# Patient Record
Sex: MALE | Race: WHITE | Employment: UNEMPLOYED | URBAN - METROPOLITAN AREA
[De-identification: names, ages, dates, MRNs, and addresses within clinical notes are randomized per-mention and may not be internally consistent; named-entity substitution may affect disease eponyms.]

---

## 2018-05-17 ENCOUNTER — TRANSFERRED RECORDS (OUTPATIENT)
Dept: HEALTH INFORMATION MANAGEMENT | Facility: CLINIC | Age: 38
End: 2018-05-17

## 2018-06-27 ENCOUNTER — TRANSFERRED RECORDS (OUTPATIENT)
Dept: HEALTH INFORMATION MANAGEMENT | Facility: CLINIC | Age: 38
End: 2018-06-27

## 2018-06-28 ENCOUNTER — TRANSFERRED RECORDS (OUTPATIENT)
Dept: HEALTH INFORMATION MANAGEMENT | Facility: CLINIC | Age: 38
End: 2018-06-28

## 2018-08-08 ENCOUNTER — TRANSFERRED RECORDS (OUTPATIENT)
Dept: HEALTH INFORMATION MANAGEMENT | Facility: CLINIC | Age: 38
End: 2018-08-08

## 2018-08-13 ENCOUNTER — TRANSFERRED RECORDS (OUTPATIENT)
Dept: HEALTH INFORMATION MANAGEMENT | Facility: CLINIC | Age: 38
End: 2018-08-13

## 2018-08-20 ENCOUNTER — TRANSFERRED RECORDS (OUTPATIENT)
Dept: HEALTH INFORMATION MANAGEMENT | Facility: CLINIC | Age: 38
End: 2018-08-20

## 2018-08-27 ENCOUNTER — TRANSFERRED RECORDS (OUTPATIENT)
Dept: HEALTH INFORMATION MANAGEMENT | Facility: CLINIC | Age: 38
End: 2018-08-27

## 2018-10-17 ENCOUNTER — TRANSFERRED RECORDS (OUTPATIENT)
Dept: HEALTH INFORMATION MANAGEMENT | Facility: CLINIC | Age: 38
End: 2018-10-17

## 2018-10-18 ENCOUNTER — TRANSFERRED RECORDS (OUTPATIENT)
Dept: HEALTH INFORMATION MANAGEMENT | Facility: CLINIC | Age: 38
End: 2018-10-18

## 2018-10-23 ENCOUNTER — TRANSFERRED RECORDS (OUTPATIENT)
Dept: HEALTH INFORMATION MANAGEMENT | Facility: CLINIC | Age: 38
End: 2018-10-23

## 2018-10-24 ENCOUNTER — PRE VISIT (OUTPATIENT)
Dept: RADIATION ONCOLOGY | Facility: CLINIC | Age: 38
End: 2018-10-24

## 2018-10-24 NOTE — TELEPHONE ENCOUNTER
Date of appointment: TBD   Diagnosis/reason for appointment:Kidney cancer with Brain Mets  Referring provider/facility: Dr. Corado  Who called: [facility] [phone]    Recent Studies  Imaging:  Pathology:  Labs:  Previous chemo/radiation (if known):    Records requested from:  Records received from:    Additional information: [facility] is faxing records, pushing images to PACS and sending slides to be reviewed for SBRT

## 2018-10-26 ENCOUNTER — TELEPHONE (OUTPATIENT)
Dept: RADIATION ONCOLOGY | Facility: CLINIC | Age: 38
End: 2018-10-26

## 2018-10-26 PROCEDURE — 00000346 ZZHCL STATISTIC REVIEW OUTSIDE SLIDES TC 88321: Performed by: INTERNAL MEDICINE

## 2018-10-26 NOTE — TELEPHONE ENCOUNTER
Referral received from [facility] regarding CNS metastases-gamma knife or whole brain radiation therapy.  Per Dr. Rawls- MRI reviewed, extensive lesions- 20 or more, would not be gamma knife candidate.  Would recommend whole brain.  Called [facility] nurse for Dr. Rodriguez with this information.  She will update Dr. Rodriguez.  Gave our number for questions.

## 2018-10-30 LAB — COPATH REPORT: NORMAL

## 2018-10-30 NOTE — TELEPHONE ENCOUNTER
Received call back from Dr. Gallego's office. They called Raphael and informed him that he needs whole brain radiation. He will be seen closer to home.

## 2018-10-30 NOTE — TELEPHONE ENCOUNTER
Received call from [facility] radiation [facility]. Left message with triage nurse line at Dr. Gallego's office to confirm where patient needs to go for RT.

## 2018-11-09 ENCOUNTER — OFFICE VISIT (OUTPATIENT)
Dept: RADIATION THERAPY | Facility: OUTPATIENT CENTER | Age: 38
End: 2018-11-09
Payer: COMMERCIAL

## 2018-11-09 ENCOUNTER — APPOINTMENT (OUTPATIENT)
Dept: RADIATION THERAPY | Facility: OUTPATIENT CENTER | Age: 38
End: 2018-11-09
Payer: COMMERCIAL

## 2018-11-09 VITALS
WEIGHT: 233 LBS | DIASTOLIC BLOOD PRESSURE: 86 MMHG | HEART RATE: 104 BPM | SYSTOLIC BLOOD PRESSURE: 135 MMHG | RESPIRATION RATE: 18 BRPM | OXYGEN SATURATION: 92 %

## 2018-11-09 DIAGNOSIS — C64.2: Primary | ICD-10-CM

## 2018-11-09 PROBLEM — E11.9 TYPE 2 DIABETES MELLITUS TREATED WITHOUT INSULIN (H): Status: ACTIVE | Noted: 2018-04-19

## 2018-11-09 PROBLEM — F06.4 ANXIETY DISORDER DUE TO GENERAL MEDICAL CONDITION: Status: ACTIVE | Noted: 2018-07-10

## 2018-11-09 RX ORDER — ATORVASTATIN CALCIUM 10 MG/1
10 TABLET, FILM COATED ORAL DAILY
Status: ON HOLD | COMMUNITY
Start: 2018-03-15 | End: 2018-11-28

## 2018-11-09 RX ORDER — BENZONATATE 200 MG/1
200 CAPSULE ORAL 3 TIMES DAILY PRN
COMMUNITY
Start: 2018-10-30

## 2018-11-09 RX ORDER — METFORMIN HCL 500 MG
1000 TABLET, EXTENDED RELEASE 24 HR ORAL
COMMUNITY
Start: 2018-03-15 | End: 2019-03-15

## 2018-11-09 RX ORDER — DEXAMETHASONE 4 MG/1
4 TABLET ORAL 2 TIMES DAILY
COMMUNITY
Start: 2018-10-18 | End: 2018-11-16

## 2018-11-09 RX ORDER — DOCUSATE SODIUM 100 MG/1
100 CAPSULE, LIQUID FILLED ORAL 2 TIMES DAILY PRN
Status: ON HOLD | COMMUNITY
Start: 2018-11-03 | End: 2018-11-25

## 2018-11-09 RX ORDER — BUSPIRONE HYDROCHLORIDE 10 MG/1
10 TABLET ORAL 2 TIMES DAILY
COMMUNITY
Start: 2018-10-23 | End: 2019-04-21

## 2018-11-09 RX ORDER — HYDROCODONE BITARTRATE AND ACETAMINOPHEN 5; 325 MG/1; MG/1
1-2 TABLET ORAL EVERY 4 HOURS PRN
COMMUNITY
Start: 2018-11-03

## 2018-11-09 RX ORDER — LEVETIRACETAM 500 MG/1
500 TABLET ORAL 2 TIMES DAILY
COMMUNITY
Start: 2018-10-18 | End: 2019-10-18

## 2018-11-09 RX ORDER — CODEINE SULFATE 30 MG/1
30-60 TABLET ORAL EVERY 4 HOURS PRN
Status: ON HOLD | COMMUNITY
Start: 2018-11-06 | End: 2018-11-23

## 2018-11-09 RX ORDER — PANTOPRAZOLE SODIUM 40 MG/1
40 TABLET, DELAYED RELEASE ORAL DAILY
COMMUNITY
Start: 2018-10-17 | End: 2019-10-17

## 2018-11-09 ASSESSMENT — PAIN SCALES - GENERAL: PAINLEVEL: NO PAIN (0)

## 2018-11-09 NOTE — MR AVS SNAPSHOT
After Visit Summary   11/9/2018    Raphael Marin    MRN: [id number]           Patient Information     Date Of Birth          1980        Visit Information        Provider Department      11/9/2018 9:30 AM Gregg Quijano [facility]         Follow-ups after your visit        Your next 10 appointments already scheduled     Nov 09, 2018 11:00 AM CST   SIMULATION with Gregg Quijano MD, L[facility]   [facility] ([facility])    [address], [address]  [facility] [address]   [phone]              Who to contact     Please call your clinic at [phone] to:    Ask questions about your health    Make or cancel appointments    Discuss your medicines    Learn about your test results    Speak to your doctor            Additional Information About Your Visit        Care EveryWhere ID     This is your Care EveryWhere ID. This could be used by other organizations to access your [place] medical records  [id number]        Your Vitals Were     Pulse Respirations Pulse Oximetry             104 18 92%          Blood Pressure from Last 3 Encounters:   11/09/18 135/86    Weight from Last 3 Encounters:   11/09/18 105.7 kg (233 lb)              Today, you had the following     No orders found for display      Information about OPIOIDS     PRESCRIPTION OPIOIDS: WHAT YOU NEED TO KNOW   We gave you an opioid (narcotic) pain medicine. It is important to manage your pain, but opioids are not always the best choice. You should first try all the other options your care team gave you. Take this medicine for as short a time (and as few doses) as possible.    Some activities can increase your pain, such as bandage changes or therapy sessions. It may help to take your pain medicine 30 to 60 minutes before these activities. Reduce your stress by getting enough sleep, working on hobbies you enjoy and practicing relaxation or meditation. Talk to your care team about  ways to manage your pain beyond prescription opioids.    These medicines have risks:    DO NOT drive when on new or higher doses of pain medicine. These medicines can affect your alertness and reaction times, and you could be arrested for driving under the influence (DUI). If you need to use opioids long-term, talk to your care team about driving.    DO NOT operate heavy machinery    DO NOT do any other dangerous activities while taking these medicines.    DO NOT drink any alcohol while taking these medicines.     If the opioid prescribed includes acetaminophen, DO NOT take with any other medicines that contain acetaminophen. Read all labels carefully. Look for the word  acetaminophen  or  Tylenol.  Ask your pharmacist if you have questions or are unsure.    You can get addicted to pain medicines, especially if you have a history of addiction (chemical, alcohol or substance dependence). Talk to your care team about ways to reduce this risk.    All opioids tend to cause constipation. Drink plenty of water and eat foods that have a lot of fiber, such as fruits, vegetables, prune juice, apple juice and high-fiber cereal. Take a laxative (Miralax, milk of magnesia, Colace, Senna) if you don t move your bowels at least every other day. Other side effects include upset stomach, sleepiness, dizziness, throwing up, tolerance (needing more of the medicine to have the same effect), physical dependence and slowed breathing.    Store your pills in a secure place, locked if possible. We will not replace any lost or stolen medicine. If you don t finish your medicine, please throw away (dispose) as directed by your pharmacist. The [place] Pollution Control Agency has more information about safe disposal: https://www.pca.[email or web address].[email or web address]/living-green/managing-unwanted-medications         Primary Care Provider Office Phone # Fax #    Lul Guerrero [phone]       [address]  [place] [address]        Equal Access to Services     [facility] VIVIENNE : Hadii aad ku hadedvinadam Varela, watonyada luqadaha, qaybjae kaalmitzi lala, stacey carrion. So [facility] [phone].    ATENCIÓN: Si habla español, tiene a monzon disposición servicios gratuitos de asistencia lingüística. [facility] [phone].    We comply with applicable federal civil rights laws and [place] laws. We do not discriminate on the basis of race, color, national origin, age, disability, sex, sexual orientation, or gender identity.            Thank you!     Thank you for choosing [facility]  for your care. Our goal is always to provide you with excellent care. Hearing back from our patients is one way we can continue to improve our services. Please take a few minutes to complete the written survey that you may receive in the mail after your visit with us. Thank you!             Your Updated Medication List - Protect others around you: Learn how to safely use, store and throw away your medicines at www.disposemymeds.org.          This list is accurate as of 11/9/18 10:56 AM.  Always use your most recent med list.                   Brand Name Dispense Instructions for use Diagnosis    atorvastatin 10 MG tablet    LIPITOR     Take 10 mg by mouth daily        benzonatate 200 MG capsule    TESSALON     Take 200 mg by mouth 3 times daily as needed        busPIRone 10 MG tablet    BUSPAR     Take 10 mg by mouth 2 times daily        codeine 30 MG tablet      Take 30-60 mg by mouth every 4 hours as needed for cough        dexamethasone 4 MG tablet    DECADRON     Take 4 mg by mouth 2 times daily        docusate sodium 100 MG capsule    COLACE     Take 100 mg by mouth 2 times daily as needed        HYDROcodone-acetaminophen 5-325 MG per tablet    NORCO     Take 1-2 tablets by mouth every 4 hours as needed        levETIRAcetam 500 MG tablet    KEPPRA     Take 500 mg by mouth 2 times daily        metFORMIN  500 MG 24 hr tablet    GLUCOPHAGE-XR     Take 1,000 mg by mouth 2 times daily (before meals)        pantoprazole 40 MG EC tablet    PROTONIX     Take 40 mg by mouth daily

## 2018-11-09 NOTE — LETTER
2018      RE: Raphael Marin  [address] [place] [facility] [address]               Consultation Note    Name: Raphael Marin MRN: [id number]   : 1980   Date of Service: 2018  Referring: Dr. Corado     Reason for consultation: Metastatic renal cell carcinoma to brain    History of Present Illness   Mr. Marin is a 38 year old male with metastatic renal cell carcinoma to the brain with numerous intracranial metastasis.  The patient is now status post resection of a dominant right frontal lobe lesion on 2018.  Final pathology confirmed metastatic renal cell carcinoma.  The patient subsequent presents today to discuss the potential role of whole brain radiation therapy.      The patient's history dates to May 2018 when he initially presented with left flank pain and gross hematuria.  CT scan of the abdomen and pelvis at the time demonstrated an 11 cm mass arising from the left kidney and numerous lung nodules.  There is also noted retroperitoneal adenopathy.  On 2018 the patient underwent left radical nephrectomy.  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  A single left retroperitoneal lymph node was positive for metastatic carcinoma, pathologic T3a N1.  The patient was subsequently continued on adjuvant systemic therapy with sunitinib.  After progression of disease with increase in size and number of multiple lung nodules, periaortic lymph nodes, and left adrenal nodule systemic therapy was changed to ipilimumab and nivolumab.  Last  cycle of immunotherapy was on 2018.  More recently the patient presented with symptoms of headache and nausea.  He underwent MRI of the brain on 10/17/2018 which demonstrated numerous intracranial metastasis in bilateral cerebral hemispheres consistent with metastatic disease.  The largest is in the right frontal lobe measuring 3.5-3.5 cm.  There was evidence  for leftward midline shift and surrounding vasogenic edema.  On 11/1/2018 the patient underwent right frontal craniotomy for resection of the dominant frontal mass.  Final pathology confirmed metastatic renal cell carcinoma.  The patient subsequent presents today to discuss the potential role of whole brain radiation therapy.    He is overall doing well.  He states his headache and nausea symptoms have since resolved.  He denies any current focal neurologic change.  He remains on Decadron 4 mg twice daily.  Last chemotherapy cycle on 9/26/2018, schedule cycle on 10/17/2018 was held.  He will see the neurosurgery team on 11/14/2018 for suture removal.      Past Medical History:   Past Medical History:   Diagnosis Date     Brain metastases (H)     resection 11/1/18 [place] [place] Everywhere       Past Surgical History:   History reviewed. No pertinent surgical history.    Chemotherapy History:  Per HPI    Radiation History:  none    Pregnant: Not Applicable  Implanted Cardiac Devices: No    Medications:  Current Outpatient Prescriptions   Medication     atorvastatin (LIPITOR) 10 MG tablet     benzonatate (TESSALON) 200 MG capsule     busPIRone (BUSPAR) 10 MG tablet     codeine 30 MG tablet     dexamethasone (DECADRON) 4 MG tablet     docusate sodium (COLACE) 100 MG capsule     HYDROcodone-acetaminophen (NORCO) 5-325 MG per tablet     levETIRAcetam (KEPPRA) 500 MG tablet     metFORMIN (GLUCOPHAGE-XR) 500 MG 24 hr tablet     pantoprazole (PROTONIX) 40 MG EC tablet     No current facility-administered medications for this visit.          Allergies:   No Known Allergies    Social History:  Tobacco: none  Alcohol: none  Employment:     Family History:  History reviewed. No pertinent family history.    Review of Systems   A 10-point review of systems was performed. Pertinent findings are noted in the HPI.    Physical Exam   ECOG Status: 1    Vitals:  /86  Pulse 104  Resp 18  Wt 105.7 kg (233 lb)  SpO2  92%    Gen: Alert, in NAD  Head: NC/AT, s/p right frontal craniotomy. Incision with staples, healing well.   Eyes: PERRL, EOMI, sclera anicteric  Ears: No external auricular lesions  Pulm: No wheezing, stridor or respiratory distress  CV: Extremities are warm and well-perfused, no cyanosis, no pedal edema  Abdominal: Normal bowel sounds, soft, nontender, no masses  Musculoskeletal: Normal bulk and tone  Skin: Normal color and turgor  Neuro: A/Ox3, CN II-XII intact, normal gait. No focal neurologic change.     Imaging/Path/Labs   Imaging:   10/17/18  MRI Brain  FINDINGS:  Numerous intra-axial scattered peripherally enhancing lesions are seen  throughout cerebral hemispheres.  Largest is seen in the right frontal lobe,  image 21 series 12, measuring 3.5 x 3.5 cm.  Longitudinal dimension is about  3.3 cm image 25 series 13.  There is evidence of central necrosis.  Surrounding  vasogenic edema is present.  A peripherally enhancing lesion is seen in the  left frontal lobe superiorly image 25 series 12, measuring about 1.5 cm.  Peripherally enhancing lesion is seen in the left parietal lobe image 21 series  12, measuring about 1.5 cm.  There smaller enhancing lesions seen in both  frontal lobes, both parietal lobes, both occipital lobes, left temporal lobe.  An enhancing lesion is seen in the left cerebellar hemisphere image 8 of series  12 measuring 1.8 cm.  A smaller enhancing lesion is seen in the right  cerebellar hemisphere image 8 of series 12 measuring about 4 mm.  The  appearance is consistent with widespread intracerebral metastatic disease.  There foci of blooming artifact seen within the largest lesion right frontal  lobe, suggesting intralesional petechial hemorrhage.  There is leftward midline  shift from the right frontal lobe lesion, measuring about 4 mm.  Basilar  cisterns are patent.    Visualized vascular structures demonstrate normal appearing flow voids.  No  definite acute intracerebral  hemorrhage.  No evidence for nonhemorrhagic  infarct.    Sella, suprasellar regions appear normal.  No intraorbital abnormalities are  identified.  Mucosal thickening is seen in right ethmoid air cells and left  maxillary sinus.  Retention cyst right maxillary sinus.  No abnormal temporal  bone signal.    IMPRESSION:  1. Numerous enhancing intracerebral lesions are present bilaterally, consistent  with metastatic disease.  The largest is in the right frontal lobe creating  leftward midline shift and surrounding vasogenic edema.  There is evidence of  intralesional petechial hemorrhage within the largest lesion.  Lesions are also  seen within cerebellar hemispheres.  2. No evidence for transtentorial herniation.  No evidence for nonhemorrhagic  infarct.  3. Sinus disease as described.    11/2/18  MRI brain (post-op)  FINDINGS:  There is interval postop change reflecting right frontal craniotomy and  excision of right frontal lobe mass, with fluid/hemorrhage in the operative  bed, not unexpected.  There is some peripheral T1 hyperintensity in the  operative bed, which is most compatible with postoperative hemorrhage.  There  is a small amount of residual enhancing soft tissue in the inferior medial  aspect of the operative bed measuring 0.8 cm in diameter which may represent  underlying residual malignancy or a separate adjacent metastasis.        Numerous metastases with peripheral ring enhancement seen in the frontal,  parietal, left temporal lobes as well as the bilateral cerebellar hemispheres.  The multifocal ring-enhancing lesions are all slightly increased in size when  compared to the previous study, with slight increase in surrounding vasogenic  edema.  For example the right cerebellar lesion does appear to be slightly  increased in conspicuity now measuring 0.6 cm, previously measuring only 0.3  cm.  A 2nd right cerebellar lesion more inferiorly on series 14 image 46 is now  seen measuring 0.3 mm, previously  seen as only a tiny punctate area of  enhancement.  Left temporal lobe mass measures 1.2 x 0.9 cm in diameter,  slightly increased in size.  Another left posterior temporal lobe lesion  measures 0.4 cm in diameter, previously only a punctate focus of enhancement.        There is no acute cortical infarct.  All major flow voids are present and  unremarkable in appearance.        Chronic mucosal thickening is seen in the right maxillary and left maxillary  sinus with complete right maxillary sinus opacification.  Mastoids are clear.  The sella and craniocervical junction are normal.  Basilar cisterns are patent.    IMPRESSION:  1. Interval postop change reflecting right frontal lobe mass excision.  2. Interval increase in size of numerous cerebral and cerebellar metastases  with slight increase in surrounding vasogenic edema  3. No acute infarct.    Path:   11/1/18  DIAGNOSIS:      RIGHT FRONTAL LOBE MASS, BIOPSY       --METASTATIC CARCINOMA, CONSISTENT WITH RENAL PRIMARY      RIGHT FRONTAL LOBE MASS, EXCISION       --METASTATIC CARCINOMA, CONSISTENT WITH RENAL PRIMARY        Assessment    Mr. Marin is a 38 year old male with metastatic renal cell carcinoma to the brain with numerous intracranial metastasis.  The patient is now status post resection of a dominant right frontal lobe lesion on 11/1/2018.  Final pathology confirmed metastatic renal cell carcinoma.  The patient subsequent presents today to discuss the potential role of whole brain radiation therapy.      Plan   1.  Given the presence of multiple intracranial metastasis, we recommend whole brain radiation therapy. We plan to treat to 30 Gy in 10 fractions.     2.  Side effects of treatment were discussed with the patient including but not limited to fatigue, scalp irritation, alopecia, focal neurologic change, and neurocognitive decline.  CT simulation was obtained today.  Consent obtained today.  Plan will be to begin radiation treatment in approximately 1  week after suture removal.    3.  We recommend continuing his current dose of steroid as radiation induced inflammation could also elicit further neurologic change.  We will plan to taper steroid after radiation therapy is complete.    4.  We will discuss with our medical oncology colleagues for recommend consideration of systemic therapy 2-4 weeks after completion of radiation treatment to allow for the patient to recover from acute side effects.  The patient agrees with the current plan in place.      Gregg Quijano MD  Department of Radiation Oncology  [facility]

## 2018-11-09 NOTE — PROGRESS NOTES
Department of Radiation Oncology  Radiation Therapy Center  [facility]  [address], [address]  [place] [address]  [phone]       Consultation Note    Name: Raphael Marin MRN: [id number]   : 1980   Date of Service: 2018  Referring: Dr. Corado     Reason for consultation: Metastatic renal cell carcinoma to brain    History of Present Illness   Mr. Marin is a 38 year old male with metastatic renal cell carcinoma to the brain with numerous intracranial metastasis.  The patient is now status post resection of a dominant right frontal lobe lesion on 2018.  Final pathology confirmed metastatic renal cell carcinoma.  The patient subsequent presents today to discuss the potential role of whole brain radiation therapy.      The patient's history dates to May 2018 when he initially presented with left flank pain and gross hematuria.  CT scan of the abdomen and pelvis at the time demonstrated an 11 cm mass arising from the left kidney and numerous lung nodules.  There is also noted retroperitoneal adenopathy.  On 2018 the patient underwent left radical nephrectomy.  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  A single left retroperitoneal lymph node was positive for metastatic carcinoma, pathologic T3a N1.  The patient was subsequently continued on adjuvant systemic therapy with sunitinib.  After progression of disease with increase in size and number of multiple lung nodules, periaortic lymph nodes, and left adrenal nodule systemic therapy was changed to ipilimumab and nivolumab.  Last  cycle of immunotherapy was on 2018.  More recently the patient presented with symptoms of headache and nausea.  He underwent MRI of the brain on 10/17/2018 which demonstrated numerous intracranial metastasis in bilateral cerebral hemispheres consistent with metastatic  disease.  The largest is in the right frontal lobe measuring 3.5-3.5 cm.  There was evidence for leftward midline shift and surrounding vasogenic edema.  On 11/1/2018 the patient underwent right frontal craniotomy for resection of the dominant frontal mass.  Final pathology confirmed metastatic renal cell carcinoma.  The patient subsequent presents today to discuss the potential role of whole brain radiation therapy.    He is overall doing well.  He states his headache and nausea symptoms have since resolved.  He denies any current focal neurologic change.  He remains on Decadron 4 mg twice daily.  Last chemotherapy cycle on 9/26/2018, schedule cycle on 10/17/2018 was held.  He will see the neurosurgery team on 11/14/2018 for suture removal.      Past Medical History:   Past Medical History:   Diagnosis Date     Brain metastases (H)     [facility] [place]       Past Surgical History:   History reviewed. No pertinent surgical history.    Chemotherapy History:  Per HPI    Radiation History:  none    Pregnant: Not Applicable  Implanted Cardiac Devices: No    Medications:  Current Outpatient Prescriptions   Medication     atorvastatin (LIPITOR) 10 MG tablet     benzonatate (TESSALON) 200 MG capsule     busPIRone (BUSPAR) 10 MG tablet     codeine 30 MG tablet     dexamethasone (DECADRON) 4 MG tablet     docusate sodium (COLACE) 100 MG capsule     HYDROcodone-acetaminophen (NORCO) 5-325 MG per tablet     levETIRAcetam (KEPPRA) 500 MG tablet     metFORMIN (GLUCOPHAGE-XR) 500 MG 24 hr tablet     pantoprazole (PROTONIX) 40 MG EC tablet     No current facility-administered medications for this visit.          Allergies:   No Known Allergies    Social History:  Tobacco: none  Alcohol: none  Employment:     Family History:  History reviewed. No pertinent family history.    Review of Systems   A 10-point review of systems was performed. Pertinent findings are noted in the HPI.    Physical Exam    ECOG Status: 1    Vitals:  /86  Pulse 104  Resp 18  Wt 105.7 kg (233 lb)  SpO2 92%    Gen: Alert, in NAD  Head: NC/AT, s/p right frontal craniotomy. Incision with staples, healing well.   Eyes: PERRL, EOMI, sclera anicteric  Ears: No external auricular lesions  Pulm: No wheezing, stridor or respiratory distress  CV: Extremities are warm and well-perfused, no cyanosis, no pedal edema  Abdominal: Normal bowel sounds, soft, nontender, no masses  Musculoskeletal: Normal bulk and tone  Skin: Normal color and turgor  Neuro: A/Ox3, CN II-XII intact, normal gait. No focal neurologic change.     Imaging/Path/Labs   Imaging:   10/17/18  MRI Brain  FINDINGS:  Numerous intra-axial scattered peripherally enhancing lesions are seen  throughout cerebral hemispheres.  Largest is seen in the right frontal lobe,  image 21 series 12, measuring 3.5 x 3.5 cm.  Longitudinal dimension is about  3.3 cm image 25 series 13.  There is evidence of central necrosis.  Surrounding  vasogenic edema is present.  A peripherally enhancing lesion is seen in the  left frontal lobe superiorly image 25 series 12, measuring about 1.5 cm.  Peripherally enhancing lesion is seen in the left parietal lobe image 21 series  12, measuring about 1.5 cm.  There smaller enhancing lesions seen in both  frontal lobes, both parietal lobes, both occipital lobes, left temporal lobe.  An enhancing lesion is seen in the left cerebellar hemisphere image 8 of series  12 measuring 1.8 cm.  A smaller enhancing lesion is seen in the right  cerebellar hemisphere image 8 of series 12 measuring about 4 mm.  The  appearance is consistent with widespread intracerebral metastatic disease.  There foci of blooming artifact seen within the largest lesion right frontal  lobe, suggesting intralesional petechial hemorrhage.  There is leftward midline  shift from the right frontal lobe lesion, measuring about 4 mm.  Basilar  cisterns are patent.    Visualized vascular  structures demonstrate normal appearing flow voids.  No  definite acute intracerebral hemorrhage.  No evidence for nonhemorrhagic  infarct.    Sella, suprasellar regions appear normal.  No intraorbital abnormalities are  identified.  Mucosal thickening is seen in right ethmoid air cells and left  maxillary sinus.  Retention cyst right maxillary sinus.  No abnormal temporal  bone signal.    IMPRESSION:  1. Numerous enhancing intracerebral lesions are present bilaterally, consistent  with metastatic disease.  The largest is in the right frontal lobe creating  leftward midline shift and surrounding vasogenic edema.  There is evidence of  intralesional petechial hemorrhage within the largest lesion.  Lesions are also  seen within cerebellar hemispheres.  2. No evidence for transtentorial herniation.  No evidence for nonhemorrhagic  infarct.  3. Sinus disease as described.    11/2/18  MRI brain (post-op)  FINDINGS:  There is interval postop change reflecting right frontal craniotomy and  excision of right frontal lobe mass, with fluid/hemorrhage in the operative  bed, not unexpected.  There is some peripheral T1 hyperintensity in the  operative bed, which is most compatible with postoperative hemorrhage.  There  is a small amount of residual enhancing soft tissue in the inferior medial  aspect of the operative bed measuring 0.8 cm in diameter which may represent  underlying residual malignancy or a separate adjacent metastasis.        Numerous metastases with peripheral ring enhancement seen in the frontal,  parietal, left temporal lobes as well as the bilateral cerebellar hemispheres.  The multifocal ring-enhancing lesions are all slightly increased in size when  compared to the previous study, with slight increase in surrounding vasogenic  edema.  For example the right cerebellar lesion does appear to be slightly  increased in conspicuity now measuring 0.6 cm, previously measuring only 0.3  cm.  A 2nd right cerebellar  lesion more inferiorly on series 14 image 46 is now  seen measuring 0.3 mm, previously seen as only a tiny punctate area of  enhancement.  Left temporal lobe mass measures 1.2 x 0.9 cm in diameter,  slightly increased in size.  Another left posterior temporal lobe lesion  measures 0.4 cm in diameter, previously only a punctate focus of enhancement.        There is no acute cortical infarct.  All major flow voids are present and  unremarkable in appearance.        Chronic mucosal thickening is seen in the right maxillary and left maxillary  sinus with complete right maxillary sinus opacification.  Mastoids are clear.  The sella and craniocervical junction are normal.  Basilar cisterns are patent.    IMPRESSION:  1. Interval postop change reflecting right frontal lobe mass excision.  2. Interval increase in size of numerous cerebral and cerebellar metastases  with slight increase in surrounding vasogenic edema  3. No acute infarct.    Path:   11/1/18  DIAGNOSIS:      RIGHT FRONTAL LOBE MASS, BIOPSY       --METASTATIC CARCINOMA, CONSISTENT WITH RENAL PRIMARY      RIGHT FRONTAL LOBE MASS, EXCISION       --METASTATIC CARCINOMA, CONSISTENT WITH RENAL PRIMARY        Assessment    Mr. Marin is a 38 year old male with metastatic renal cell carcinoma to the brain with numerous intracranial metastasis.  The patient is now status post resection of a dominant right frontal lobe lesion on 11/1/2018.  Final pathology confirmed metastatic renal cell carcinoma.  The patient subsequent presents today to discuss the potential role of whole brain radiation therapy.      Plan   1.  Given the presence of multiple intracranial metastasis, we recommend whole brain radiation therapy. We plan to treat to 30 Gy in 10 fractions.     2.  Side effects of treatment were discussed with the patient including but not limited to fatigue, scalp irritation, alopecia, focal neurologic change, and neurocognitive decline.  CT simulation was obtained today.   Consent obtained today.  Plan will be to begin radiation treatment in approximately 1 week after suture removal.    3.  We recommend continuing his current dose of steroid as radiation induced inflammation could also elicit further neurologic change.  We will plan to taper steroid after radiation therapy is complete.    4.  We will discuss with our medical oncology colleagues for recommend consideration of systemic therapy 2-4 weeks after completion of radiation treatment to allow for the patient to recover from acute side effects.  The patient agrees with the current plan in place.      Gregg Quijano MD  Department of Radiation Oncology  [facility]

## 2018-11-09 NOTE — NURSING NOTE
REASON FOR APPOINTMENT   Type of Cancer: Clear cell/Renal, now with brain mets  Location: also has lung mets  Date of Symptom Onset: diagnosised 6/2018, progression in lungs found shortly after, neuro symptoms mid October prompted CT scan of head which showed numerous brain mets    TREATMENT TO-DATE FOR THIS CANCER  Surgery ? 11/1/18 resection of brain met, L nephrectomy 6/27/18   Chemotherapy ? Was most recently on immunotherapy, last infusion 9/26/18   Other Treatments for this Cancer ? Discussion for whole brain, then will return to med onc for ongoing immune therapy    PERSONAL HISTORY OF CANCER   Previous Cancer ? no   Prior Radiation ? no   Prior Chemotherapy ? no   Prior Hormonal Therapy ? no     RECENT IMAGING STUDIES  See PACS and care everywhere for images from [place]    REFERRALS NEEDED  None at this time. Currently working with [facility] for financial support    VITALS  /86  Pulse 104  Resp 18  Wt 105.7 kg (233 lb)  SpO2 92%    PACEMAKER/IMPLANTED CARDIAC DEVICE no    PAIN  Denies    PSYCHOSOCIAL  Marital Status: single  Patient lives in [place] with mom and step dad. Pt had to move back home due to health and financial changes.  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 Currently unemployed due to medical treatments  Do you feel safe in your home? Yes    REVIEW OF SYSTEMS  Skin: large incision on R scalp, staples C/D/I.  No s/s of infection.  Eyes: glasses, denies vision chagnes  Ears/Nose/Throat: negative  Respiratory: Dyspnea on exertion- ongoing for at least 2 months and Cough- ongoing for at least 2 months - potentially related to lung mets and immunothearpy  Cardiovascular: negative  Gastrointestinal: negative  Genitourinary: negative  Musculoskeletal: negative  Neurologic: negative for: headaches, seizures and local weakness  Psychiatric: stress/anxiety  Hematologic/Lymphatic/Immunologic: negative  Endocrine: diabetes    Radiation  "Oncology Patient Teaching    Current Concern: \"how many treatments?\" \" can I drive\"    Person involved with teaching: Patient and Mother  Patient asked Questions: Yes  Patient was cooperative: Yes  Patient was receptive (willing to accept information given): Yes    Education Assessment  Comprehension ability: Medium  Knowledge level: Medium  Factors affecting teaching: stress, new infomration    Education Materials Given  Radiation Therapy and You  Radiation to the Brain  Caring for Your Skin During Radiation ...    Educational Topics Discussed  Side effects, Medications, Activity, Nutrition, Adjustment to illness and When to call MD/RN    Response To Teaching  More review necessary    Do you have an advanced directive or living will? no  Are you DNR/DNI? no        "

## 2018-11-15 ENCOUNTER — APPOINTMENT (OUTPATIENT)
Dept: RADIATION THERAPY | Facility: OUTPATIENT CENTER | Age: 38
End: 2018-11-15
Payer: COMMERCIAL

## 2018-11-16 ENCOUNTER — APPOINTMENT (OUTPATIENT)
Dept: RADIATION THERAPY | Facility: OUTPATIENT CENTER | Age: 38
End: 2018-11-16
Payer: COMMERCIAL

## 2018-11-16 DIAGNOSIS — C79.31 BRAIN METASTASIS: Primary | ICD-10-CM

## 2018-11-16 RX ORDER — DEXAMETHASONE 4 MG/1
4 TABLET ORAL EVERY 8 HOURS
Qty: 90 TABLET | Refills: 0 | Status: ON HOLD | OUTPATIENT
Start: 2018-11-16 | End: 2018-11-25

## 2018-11-16 NOTE — PROGRESS NOTES
Radiation Oncology Progress Note    Patient was seen today after radiation treatment with complaint of mild headache.  On discussion, patient and family felt they had noticed slight increase in confusion and difficulty with word choice. While overall coherent and without any focal change on neurologic exam, there was subtle confusion noticed when compared to initial consultation since starting radiation treatment. Patient is currently on decadron 4mg BID.     PE:  AOX3. Coherent. Speaking overall clearly without difficulty. Very subtle increase in confusion at times.   No gross focal neurologic deficits.       A/P: 38M with metastatic RCC to brain receiving whole brain radiation treatment. Very subtle increase confusion compared to pre-radiation baseline.    - Increase decadron dose from 4mg BID to TID.   - Discussed with patient and family if neurologic symptoms acutely worsen despite increase in steroid dose, would have patient go to emergency department for evaluation.   - Patient and family agree with plan in place.       Gregg Quijano M.D.  Department of Radiation Oncology  [facility]

## 2018-11-19 ENCOUNTER — APPOINTMENT (OUTPATIENT)
Dept: RADIATION THERAPY | Facility: OUTPATIENT CENTER | Age: 38
End: 2018-11-19
Payer: COMMERCIAL

## 2018-11-20 ENCOUNTER — APPOINTMENT (OUTPATIENT)
Dept: RADIATION THERAPY | Facility: OUTPATIENT CENTER | Age: 38
End: 2018-11-20
Payer: COMMERCIAL

## 2018-11-21 ENCOUNTER — OFFICE VISIT (OUTPATIENT)
Dept: RADIATION THERAPY | Facility: OUTPATIENT CENTER | Age: 38
End: 2018-11-21
Payer: COMMERCIAL

## 2018-11-21 ENCOUNTER — APPOINTMENT (OUTPATIENT)
Dept: RADIATION THERAPY | Facility: OUTPATIENT CENTER | Age: 38
End: 2018-11-21
Payer: COMMERCIAL

## 2018-11-21 VITALS
RESPIRATION RATE: 20 BRPM | DIASTOLIC BLOOD PRESSURE: 86 MMHG | HEART RATE: 108 BPM | OXYGEN SATURATION: 91 % | SYSTOLIC BLOOD PRESSURE: 125 MMHG

## 2018-11-21 DIAGNOSIS — C79.31 BRAIN METASTASIS: Primary | ICD-10-CM

## 2018-11-21 DIAGNOSIS — C64.2: Primary | ICD-10-CM

## 2018-11-21 DIAGNOSIS — C78.00 METASTASIS TO LUNG (H): ICD-10-CM

## 2018-11-21 DIAGNOSIS — C64.2 MALIGNANT TUMOR OF KIDNEY, LEFT (H): ICD-10-CM

## 2018-11-21 NOTE — PROGRESS NOTES
[facility]YS[facility]ANS  SPECIALIZING IN BREAKTHROUGHS  Radiation Oncology    On Treatment Visit Note      Raphael Marin      Date: 2018   MRN: [id number]   : 1980  Diagnosis: Clear Cell Renal with mets to brain      Reason for Visit:  On Radiation Treatment Visit     Treatment Summary to Date  Treatment Site: whole brain Current Dose: 1500/3000 cGy Fractions: 5/10        Subjective:   Patient has had mild intermittent headache and mild increase in right upper extremity weakness. He states he first noticed mild weakness in RUE after initial surgery after resection of dominant brain met. Since starting RT, he has noticed slight increase in weakness in RUE compared to left. No falls. Energy is low. Not eating a whole lot. PO intake limited by mild abdominal bloating. Recently took magnesium citrate to help with bowel movement and constipation, currently using miralax daily for bowel regimen.    Nursing ROS:   Nutrition Alteration  Diet Type: Patient's Preference  Skin  Skin Reaction: 0 - No changes        Cardiovascular  Respiratory effort: 2 - Mild dyspnea on exertion  Gastrointestinal  Nausea: 1 - One to two episodes of nausea/24  Constipation NCI: 2 - Persistent symptoms        Pain Assessment  Pain Note: pain in abdomen better      Objective:   /86  Pulse 108  Resp 20  SpO2 91%   No apparent distress.   Speech coherent and fluent.   Slight RUE weakness compared to left. No sensory change.     Labs:  CBC RESULTS: No results for input(s): WBC, RBC, HGB, HCT, MCV, MCH, MCHC, RDW, PLT in the last 42391 hours.  ELECTROLYTES:  No results for input(s): NA, POTASSIUM, CHLORIDE, MED, CO2, BUN, CR, GLC in the last 53666 hours.    Assessment:  38M with metastatic renal cell cancer to brain, receiving whole brain radiation therapy.   Tolerating radiation therapy well.  All questions and concerns addressed.    Plan:   1. Continue current therapy.    2. Given increase in RUE weakness, there is  concern for radiation induced inflammation. Will Increase decadron dose from 4mg q8hrs to q6hrs.   3. Discussed with patient and family if neurologic symptoms acutely worsen despite increase in steroid dose, would have patient go to emergency department for evaluation.  4. Continue aggressive bowel regimen with miralax and adequate hydration. Consider Gas -X for bloating symptoms.   5.  Discussed appetite stimulant with patient, currently does not wish to take.       Mosaiq chart and setup information reviewed  Ports checked    Medication Review  Med list reviewed with patient?: Yes    Educational Topic Discussed  Education Instructions: talked about nutrition, bowel management      Gregg Quijano MD

## 2018-11-21 NOTE — MR AVS SNAPSHOT
After Visit Summary   11/21/2018    Raphael Marin    MRN: [id number]           Patient Information     Date Of Birth          1980        Visit Information        Provider Department      11/21/2018 4:05 PM Gregg Quijano MD Radiation Therapy Center         Follow-ups after your visit        Your next 10 appointments already scheduled     Nov 23, 2018  3:50 PM CST   Linear Accelerator with [facility] Rad Tech   Radiation Therapy Center ([facility])    [address], [address]  [facility] [address]   [phone]            Nov 26, 2018  3:50 PM CST   Linear Accelerator with [facility] Rad Tech   Radiation Therapy Center ([facility])    [address], [address]  [facility] [address]   [phone]            Nov 27, 2018  3:50 PM CST   Linear Accelerator with [facility] Rad Tech   Radiation Therapy Center ([facility])    [address], [address]  [facility] [address]   [phone]            Nov 28, 2018  3:50 PM CST   Linear Accelerator with [facility] Rad Tech   Radiation Therapy Center ([facility])    [address], [address]  [facility] [address]   [phone]            Nov 28, 2018  4:05 PM CST   ON TREATMENT VISIT with Gregg Quijano MD   Radiation Therapy Center ([facility])    [address]  [facility] [address]   [phone]            Nov 29, 2018  3:50 PM CST   Linear Accelerator with [facility] Rad Tech   Radiation Therapy Center ([facility])    [address], [address]  [facility] [address]   [phone]              Who to contact     Please call your clinic at [phone] to:    Ask questions about your health    Make or cancel appointments    Discuss your medicines    Learn about your test results    Speak to your doctor            Additional Information About Your Visit        Care EveryWhere ID     This is your Care EveryWhere ID. This could be used by other organizations to  access your [place] medical records  [id number]         Blood Pressure from Last 3 Encounters:   11/09/18 135/86    Weight from Last 3 Encounters:   11/09/18 105.7 kg (233 lb)              Today, you had the following     No orders found for display         Today's Medication Changes          These changes are accurate as of 11/21/18  4:24 PM.  If you have any questions, ask your nurse or doctor.               Start taking these medicines.        Dose/Directions    order for DME   Used for:  Brain metastasis (H), Metastasis to lung (H)   Started by:  Gregg Quijano MD        Equipment being ordered: Hospital Bed Requesting hospital bed for living room on main floor. 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 Dizziness, fall risk, fatigue/malaise, difficulty navigating stairs due to brain mets. Also CERON/SOB affects mobility around home especially up and down stairs.   Quantity:  1 each   Refills:  0            Where to get your medicines      Some of these will need a paper prescription and others can be bought over the counter.  Ask your nurse if you have questions.     Bring a paper prescription for each of these medications     order for DME                Primary Care Provider Office Phone # Fax #    Lul Guerrero [phone]       [address] [place] [address]        Equal Access to Services     [place] AH: Kat busby Soeyal, waaxda luqadaha, qaybta kaalmada adeegyada, stacey carrion. So [facility] [phone].    ATENCIÓN: Si habla español, tiene a monzon disposición servicios gratuitos de asistencia lingüística. Llame al [phone].    We comply with applicable federal civil rights laws and [place] laws. We do not discriminate on the basis of race, color, national origin, age, disability, sex, sexual orientation, or gender identity.            Thank you!      Thank you for choosing [facility]Y [facility]ENTER  for your care. Our goal is always to provide you with excellent care. Hearing back from our patients is one way we can continue to improve our services. Please take a few minutes to complete the written survey that you may receive in the mail after your visit with us. Thank you!             Your Updated Medication List - Protect others around you: Learn how to safely use, store and throw away your medicines at www.disposemymeds.org.          This list is accurate as of 11/21/18  4:24 PM.  Always use your most recent med list.                   Brand Name Dispense Instructions for use Diagnosis    atorvastatin 10 MG tablet    LIPITOR     Take 10 mg by mouth daily        benzonatate 200 MG capsule    TESSALON     Take 200 mg by mouth 3 times daily as needed        busPIRone 10 MG tablet    BUSPAR     Take 10 mg by mouth 2 times daily        codeine 30 MG tablet      Take 30-60 mg by mouth every 4 hours as needed for cough        dexamethasone 4 MG tablet    DECADRON    90 tablet    Take 1 tablet (4 mg) by mouth every 8 hours    Brain metastasis (H)       docusate sodium 100 MG capsule    COLACE     Take 100 mg by mouth 2 times daily as needed        HYDROcodone-acetaminophen 5-325 MG tablet    NORCO     Take 1-2 tablets by mouth every 4 hours as needed        levETIRAcetam 500 MG tablet    KEPPRA     Take 500 mg by mouth 2 times daily        metFORMIN 500 MG 24 hr tablet    GLUCOPHAGE-XR     Take 1,000 mg by mouth 2 times daily (before meals)        order for DME     1 each    Equipment being ordered: Hospital Bed Requesting hospital bed for living room on main floor. 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 Dizziness, fall risk, fatigue/malaise, difficulty navigating stairs due to brain mets. Also CERON/SOB affects mobility around home especially up and down stairs.    Brain metastasis (H), Metastasis to lung (H)        pantoprazole 40 MG EC tablet    PROTONIX     Take 40 mg by mouth daily

## 2018-11-21 NOTE — LETTER
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 He states he first noticed mild weakness in RUE after initial surgery after resection of dominant brain met. Since starting RT, he has noticed slight increase in weakness in RUE compared to left. No falls. Energy is low. Not eating a whole lot. PO intake limited by mild abdominal bloating. Recently took magnesium citrate to help with bowel movement and constipation, currently using miralax daily for bowel regimen.    Nursing ROS:   Nutrition Alteration  Diet Type: Patient's Preference  Skin  Skin Reaction: 0 - No changes     Cardiovascular  Respiratory effort: 2 - Mild dyspnea on exertion  Gastrointestinal  Nausea: 1 - One to two episodes of nausea/24  Constipation NCI: 2 - Persistent symptoms       Pain Assessment  Pain Note: pain in abdomen better    Objective:   /86  Pulse 108  Resp 20  SpO2 91%   No apparent distress.   Speech coherent and fluent.   Slight RUE weakness compared to left. No sensory change.     Labs:  CBC RESULTS: No results for input(s): WBC, RBC, HGB, HCT, MCV, MCH, MCHC, RDW, PLT in the last 49806 hours.  ELECTROLYTES:  No results for input(s): NA, POTASSIUM, CHLORIDE, MDE, CO2, BUN, CR, GLC in the last 08725 hours.    Assessment:  38M with metastatic renal cell cancer to brain, receiving whole brain radiation therapy.   Tolerating radiation therapy well.  All questions and concerns addressed.    Plan:   1. Continue  current therapy.    2. Given increase in RUE weakness, there is concern for radiation induced inflammation. Will Increase decadron dose from 4mg q8hrs to q6hrs.   3. Discussed with patient and family if neurologic symptoms acutely worsen despite increase in steroid dose, would have patient go to emergency department for evaluation.  4. Continue aggressive bowel regimen with miralax and adequate hydration. Consider Gas -X for bloating symptoms.   5.  Discussed appetite stimulant with patient, currently does not wish to take.     Mosaiq chart and setup information reviewed  Ports checked    Medication Review  Med list reviewed with patient?: Yes  Educational Topic Discussed  Education Instructions: talked about nutrition, bowel management    Gregg Quijano MD

## 2018-11-23 ENCOUNTER — APPOINTMENT (OUTPATIENT)
Dept: RADIATION THERAPY | Facility: OUTPATIENT CENTER | Age: 38
End: 2018-11-23
Payer: COMMERCIAL

## 2018-11-23 ENCOUNTER — HOSPITAL ENCOUNTER (INPATIENT)
Facility: CLINIC | Age: 38
LOS: 6 days | Discharge: HOME HEALTH | End: 2018-11-29
Attending: FAMILY MEDICINE | Admitting: FAMILY MEDICINE
Payer: COMMERCIAL

## 2018-11-23 ENCOUNTER — APPOINTMENT (OUTPATIENT)
Dept: CT IMAGING | Facility: CLINIC | Age: 38
End: 2018-11-23
Attending: PHYSICIAN ASSISTANT
Payer: COMMERCIAL

## 2018-11-23 ENCOUNTER — TELEPHONE (OUTPATIENT)
Dept: RADIATION THERAPY | Facility: OUTPATIENT CENTER | Age: 38
End: 2018-11-23

## 2018-11-23 DIAGNOSIS — K59.01 SLOW TRANSIT CONSTIPATION: ICD-10-CM

## 2018-11-23 DIAGNOSIS — R63.0 ANOREXIA: Primary | ICD-10-CM

## 2018-11-23 DIAGNOSIS — C64.2: ICD-10-CM

## 2018-11-23 DIAGNOSIS — C79.31 BRAIN METASTASIS: ICD-10-CM

## 2018-11-23 DIAGNOSIS — R53.0 NEOPLASTIC (MALIGNANT) RELATED FATIGUE: ICD-10-CM

## 2018-11-23 DIAGNOSIS — R11.2 NAUSEA AND VOMITING, INTRACTABILITY OF VOMITING NOT SPECIFIED, UNSPECIFIED VOMITING TYPE: ICD-10-CM

## 2018-11-23 PROBLEM — R10.84 ABDOMINAL PAIN, GENERALIZED: Status: ACTIVE | Noted: 2018-11-23

## 2018-11-23 PROBLEM — R62.51 FAILURE TO THRIVE (0-17): Status: ACTIVE | Noted: 2018-11-23

## 2018-11-23 PROBLEM — R62.7 FAILURE TO THRIVE IN ADULT: Status: ACTIVE | Noted: 2018-11-23

## 2018-11-23 LAB
ALBUMIN UR-MCNC: NEGATIVE MG/DL
ANION GAP SERPL CALCULATED.3IONS-SCNC: 11 MMOL/L (ref 3–14)
APPEARANCE UR: CLEAR
BASOPHILS # BLD AUTO: 0 10E9/L (ref 0–0.2)
BASOPHILS NFR BLD AUTO: 0.1 %
BILIRUB UR QL STRIP: NEGATIVE
BUN SERPL-MCNC: 33 MG/DL (ref 7–30)
CALCIUM SERPL-MCNC: 12.5 MG/DL (ref 8.5–10.1)
CHLORIDE SERPL-SCNC: 101 MMOL/L (ref 94–109)
CO2 SERPL-SCNC: 24 MMOL/L (ref 20–32)
COLOR UR AUTO: YELLOW
CREAT SERPL-MCNC: 1.1 MG/DL (ref 0.66–1.25)
DIFFERENTIAL METHOD BLD: ABNORMAL
EOSINOPHIL # BLD AUTO: 0 10E9/L (ref 0–0.7)
EOSINOPHIL NFR BLD AUTO: 0.2 %
ERYTHROCYTE [DISTWIDTH] IN BLOOD BY AUTOMATED COUNT: 12.9 % (ref 10–15)
GFR SERPL CREATININE-BSD FRML MDRD: 74 ML/MIN/1.7M2
GLUCOSE BLDC GLUCOMTR-MCNC: 238 MG/DL (ref 70–99)
GLUCOSE SERPL-MCNC: 239 MG/DL (ref 70–99)
GLUCOSE UR STRIP-MCNC: NEGATIVE MG/DL
HBA1C MFR BLD: 6.7 % (ref 0–5.6)
HCT VFR BLD AUTO: 45.8 % (ref 40–53)
HGB BLD-MCNC: 15.2 G/DL (ref 13.3–17.7)
HGB UR QL STRIP: NEGATIVE
IMM GRANULOCYTES # BLD: 0.1 10E9/L (ref 0–0.4)
IMM GRANULOCYTES NFR BLD: 0.9 %
KETONES UR STRIP-MCNC: NEGATIVE MG/DL
LEUKOCYTE ESTERASE UR QL STRIP: NEGATIVE
LYMPHOCYTES # BLD AUTO: 0.7 10E9/L (ref 0.8–5.3)
LYMPHOCYTES NFR BLD AUTO: 6.1 %
MCH RBC QN AUTO: 28.5 PG (ref 26.5–33)
MCHC RBC AUTO-ENTMCNC: 33.2 G/DL (ref 31.5–36.5)
MCV RBC AUTO: 86 FL (ref 78–100)
MONOCYTES # BLD AUTO: 0.5 10E9/L (ref 0–1.3)
MONOCYTES NFR BLD AUTO: 4.6 %
NEUTROPHILS # BLD AUTO: 9.7 10E9/L (ref 1.6–8.3)
NEUTROPHILS NFR BLD AUTO: 88.1 %
NITRATE UR QL: NEGATIVE
NRBC # BLD AUTO: 0 10*3/UL
NRBC BLD AUTO-RTO: 0 /100
PH UR STRIP: 6 PH (ref 5–7)
PLATELET # BLD AUTO: 192 10E9/L (ref 150–450)
POTASSIUM SERPL-SCNC: 4.5 MMOL/L (ref 3.4–5.3)
RBC # BLD AUTO: 5.34 10E12/L (ref 4.4–5.9)
RBC #/AREA URNS AUTO: 2 /HPF (ref 0–2)
SODIUM SERPL-SCNC: 136 MMOL/L (ref 133–144)
SOURCE: NORMAL
SP GR UR STRIP: 1.01 (ref 1–1.03)
SQUAMOUS #/AREA URNS AUTO: <1 /HPF (ref 0–1)
UROBILINOGEN UR STRIP-MCNC: 0 MG/DL (ref 0–2)
WBC # BLD AUTO: 11.1 10E9/L (ref 4–11)
WBC #/AREA URNS AUTO: 1 /HPF (ref 0–5)

## 2018-11-23 PROCEDURE — 25000128 H RX IP 250 OP 636: Performed by: FAMILY MEDICINE

## 2018-11-23 PROCEDURE — 25000132 ZZH RX MED GY IP 250 OP 250 PS 637: Performed by: PHYSICIAN ASSISTANT

## 2018-11-23 PROCEDURE — 25000125 ZZHC RX 250: Performed by: FAMILY MEDICINE

## 2018-11-23 PROCEDURE — 83036 HEMOGLOBIN GLYCOSYLATED A1C: CPT | Performed by: PHYSICIAN ASSISTANT

## 2018-11-23 PROCEDURE — 36415 COLL VENOUS BLD VENIPUNCTURE: CPT | Performed by: PHYSICIAN ASSISTANT

## 2018-11-23 PROCEDURE — 85004 AUTOMATED DIFF WBC COUNT: CPT | Performed by: PHYSICIAN ASSISTANT

## 2018-11-23 PROCEDURE — 81001 URINALYSIS AUTO W/SCOPE: CPT | Performed by: PHYSICIAN ASSISTANT

## 2018-11-23 PROCEDURE — 25000131 ZZH RX MED GY IP 250 OP 636 PS 637: Performed by: PHYSICIAN ASSISTANT

## 2018-11-23 PROCEDURE — G0378 HOSPITAL OBSERVATION PER HR: HCPCS

## 2018-11-23 PROCEDURE — 99220 ZZC INITIAL OBSERVATION CARE,LEVL III: CPT | Performed by: PHYSICIAN ASSISTANT

## 2018-11-23 PROCEDURE — 99207 ZZC CDG-CODE CATEGORY CHANGED: CPT | Performed by: PHYSICIAN ASSISTANT

## 2018-11-23 PROCEDURE — 80048 BASIC METABOLIC PNL TOTAL CA: CPT | Performed by: PHYSICIAN ASSISTANT

## 2018-11-23 PROCEDURE — 85027 COMPLETE CBC AUTOMATED: CPT | Performed by: PHYSICIAN ASSISTANT

## 2018-11-23 PROCEDURE — 12000000 ZZH R&B MED SURG/OB

## 2018-11-23 PROCEDURE — 25000128 H RX IP 250 OP 636: Performed by: PHYSICIAN ASSISTANT

## 2018-11-23 PROCEDURE — 74177 CT ABD & PELVIS W/CONTRAST: CPT

## 2018-11-23 PROCEDURE — 00000146 ZZHCL STATISTIC GLUCOSE BY METER IP

## 2018-11-23 RX ORDER — ATORVASTATIN CALCIUM 10 MG/1
10 TABLET, FILM COATED ORAL AT BEDTIME
Status: DISCONTINUED | OUTPATIENT
Start: 2018-11-23 | End: 2018-11-29 | Stop reason: HOSPADM

## 2018-11-23 RX ORDER — IOPAMIDOL 755 MG/ML
100 INJECTION, SOLUTION INTRAVASCULAR ONCE
Status: COMPLETED | OUTPATIENT
Start: 2018-11-23 | End: 2018-11-23

## 2018-11-23 RX ORDER — LIDOCAINE 40 MG/G
CREAM TOPICAL
Status: DISCONTINUED | OUTPATIENT
Start: 2018-11-23 | End: 2018-11-29 | Stop reason: HOSPADM

## 2018-11-23 RX ORDER — LEVETIRACETAM 500 MG/1
500 TABLET ORAL 2 TIMES DAILY
Status: DISCONTINUED | OUTPATIENT
Start: 2018-11-23 | End: 2018-11-29 | Stop reason: HOSPADM

## 2018-11-23 RX ORDER — HYDROMORPHONE HYDROCHLORIDE 1 MG/ML
.3-.5 INJECTION, SOLUTION INTRAMUSCULAR; INTRAVENOUS; SUBCUTANEOUS
Status: DISCONTINUED | OUTPATIENT
Start: 2018-11-23 | End: 2018-11-29 | Stop reason: HOSPADM

## 2018-11-23 RX ORDER — HYDROCODONE BITARTRATE AND ACETAMINOPHEN 5; 325 MG/1; MG/1
1-2 TABLET ORAL EVERY 4 HOURS PRN
Status: DISCONTINUED | OUTPATIENT
Start: 2018-11-23 | End: 2018-11-23

## 2018-11-23 RX ORDER — LIDOCAINE/PRILOCAINE 2.5 %-2.5%
CREAM (GRAM) TOPICAL
Status: DISCONTINUED | OUTPATIENT
Start: 2018-11-23 | End: 2018-11-23 | Stop reason: RX

## 2018-11-23 RX ORDER — PANTOPRAZOLE SODIUM 40 MG/1
40 TABLET, DELAYED RELEASE ORAL
Status: DISCONTINUED | OUTPATIENT
Start: 2018-11-24 | End: 2018-11-29 | Stop reason: HOSPADM

## 2018-11-23 RX ORDER — MAGNESIUM CARB/ALUMINUM HYDROX 105-160MG
148 TABLET,CHEWABLE ORAL
Status: DISCONTINUED | OUTPATIENT
Start: 2018-11-23 | End: 2018-11-23

## 2018-11-23 RX ORDER — PROCHLORPERAZINE 25 MG
25 SUPPOSITORY, RECTAL RECTAL EVERY 12 HOURS PRN
Status: DISCONTINUED | OUTPATIENT
Start: 2018-11-23 | End: 2018-11-29 | Stop reason: HOSPADM

## 2018-11-23 RX ORDER — BENZONATATE 100 MG/1
200 CAPSULE ORAL 3 TIMES DAILY PRN
Status: DISCONTINUED | OUTPATIENT
Start: 2018-11-23 | End: 2018-11-29 | Stop reason: HOSPADM

## 2018-11-23 RX ORDER — AMOXICILLIN 250 MG
1 CAPSULE ORAL 2 TIMES DAILY
Status: DISCONTINUED | OUTPATIENT
Start: 2018-11-23 | End: 2018-11-29 | Stop reason: HOSPADM

## 2018-11-23 RX ORDER — BISACODYL 5 MG
15 TABLET, DELAYED RELEASE (ENTERIC COATED) ORAL DAILY PRN
Status: DISCONTINUED | OUTPATIENT
Start: 2018-11-23 | End: 2018-11-29 | Stop reason: HOSPADM

## 2018-11-23 RX ORDER — ACETAMINOPHEN 325 MG/1
650 TABLET ORAL EVERY 4 HOURS PRN
Status: DISCONTINUED | OUTPATIENT
Start: 2018-11-23 | End: 2018-11-29 | Stop reason: HOSPADM

## 2018-11-23 RX ORDER — ONDANSETRON 4 MG/1
4 TABLET, ORALLY DISINTEGRATING ORAL EVERY 6 HOURS PRN
Status: DISCONTINUED | OUTPATIENT
Start: 2018-11-23 | End: 2018-11-29 | Stop reason: HOSPADM

## 2018-11-23 RX ORDER — LIDOCAINE/PRILOCAINE 2.5 %-2.5%
CREAM (GRAM) TOPICAL PRN
COMMUNITY

## 2018-11-23 RX ORDER — POLYETHYLENE GLYCOL 3350 17 G/17G
17 POWDER, FOR SOLUTION ORAL DAILY PRN
Status: DISCONTINUED | OUTPATIENT
Start: 2018-11-23 | End: 2018-11-29 | Stop reason: HOSPADM

## 2018-11-23 RX ORDER — LIDOCAINE 40 MG/G
CREAM TOPICAL DAILY PRN
Status: COMPLETED | OUTPATIENT
Start: 2018-11-23 | End: 2018-11-23

## 2018-11-23 RX ORDER — POLYETHYLENE GLYCOL 3350 17 G/17G
17 POWDER, FOR SOLUTION ORAL DAILY PRN
Status: DISCONTINUED | OUTPATIENT
Start: 2018-11-23 | End: 2018-11-23

## 2018-11-23 RX ORDER — BISACODYL 5 MG
15 TABLET, DELAYED RELEASE (ENTERIC COATED) ORAL DAILY PRN
Status: DISCONTINUED | OUTPATIENT
Start: 2018-11-23 | End: 2018-11-23

## 2018-11-23 RX ORDER — POLYETHYLENE GLYCOL 3350 17 G/17G
17 POWDER, FOR SOLUTION ORAL DAILY
Status: ON HOLD | COMMUNITY
End: 2018-11-25

## 2018-11-23 RX ORDER — DEXTROSE MONOHYDRATE 25 G/50ML
25-50 INJECTION, SOLUTION INTRAVENOUS
Status: DISCONTINUED | OUTPATIENT
Start: 2018-11-23 | End: 2018-11-29 | Stop reason: HOSPADM

## 2018-11-23 RX ORDER — DEXAMETHASONE 4 MG/1
4 TABLET ORAL ONCE
Status: COMPLETED | OUTPATIENT
Start: 2018-11-23 | End: 2018-11-23

## 2018-11-23 RX ORDER — BISACODYL 5 MG
5 TABLET, DELAYED RELEASE (ENTERIC COATED) ORAL DAILY PRN
Status: DISCONTINUED | OUTPATIENT
Start: 2018-11-23 | End: 2018-11-23

## 2018-11-23 RX ORDER — BISACODYL 5 MG
10 TABLET, DELAYED RELEASE (ENTERIC COATED) ORAL DAILY PRN
Status: DISCONTINUED | OUTPATIENT
Start: 2018-11-23 | End: 2018-11-29 | Stop reason: HOSPADM

## 2018-11-23 RX ORDER — DEXAMETHASONE 4 MG/1
8 TABLET ORAL EVERY 12 HOURS SCHEDULED
Status: DISCONTINUED | OUTPATIENT
Start: 2018-11-24 | End: 2018-11-29 | Stop reason: HOSPADM

## 2018-11-23 RX ORDER — HEPARIN SODIUM,PORCINE 10 UNIT/ML
5-10 VIAL (ML) INTRAVENOUS EVERY 24 HOURS
Status: DISCONTINUED | OUTPATIENT
Start: 2018-11-23 | End: 2018-11-29 | Stop reason: HOSPADM

## 2018-11-23 RX ORDER — BISACODYL 5 MG
10 TABLET, DELAYED RELEASE (ENTERIC COATED) ORAL DAILY PRN
Status: DISCONTINUED | OUTPATIENT
Start: 2018-11-23 | End: 2018-11-23

## 2018-11-23 RX ORDER — ONDANSETRON 2 MG/ML
4 INJECTION INTRAMUSCULAR; INTRAVENOUS EVERY 6 HOURS PRN
Status: DISCONTINUED | OUTPATIENT
Start: 2018-11-23 | End: 2018-11-29 | Stop reason: HOSPADM

## 2018-11-23 RX ORDER — BISACODYL 5 MG
5 TABLET, DELAYED RELEASE (ENTERIC COATED) ORAL DAILY PRN
Status: DISCONTINUED | OUTPATIENT
Start: 2018-11-23 | End: 2018-11-29 | Stop reason: HOSPADM

## 2018-11-23 RX ORDER — HEPARIN SODIUM,PORCINE 10 UNIT/ML
5-10 VIAL (ML) INTRAVENOUS
Status: DISCONTINUED | OUTPATIENT
Start: 2018-11-23 | End: 2018-11-29 | Stop reason: HOSPADM

## 2018-11-23 RX ORDER — PROCHLORPERAZINE MALEATE 10 MG
10 TABLET ORAL EVERY 6 HOURS PRN
Status: DISCONTINUED | OUTPATIENT
Start: 2018-11-23 | End: 2018-11-29 | Stop reason: HOSPADM

## 2018-11-23 RX ORDER — ALBUTEROL SULFATE 90 UG/1
2 AEROSOL, METERED RESPIRATORY (INHALATION) EVERY 4 HOURS PRN
COMMUNITY

## 2018-11-23 RX ORDER — BUSPIRONE HYDROCHLORIDE 10 MG/1
10 TABLET ORAL 2 TIMES DAILY
Status: DISCONTINUED | OUTPATIENT
Start: 2018-11-23 | End: 2018-11-29 | Stop reason: HOSPADM

## 2018-11-23 RX ORDER — LIDOCAINE/PRILOCAINE 2.5 %-2.5%
CREAM (GRAM) TOPICAL DAILY PRN
Status: DISCONTINUED | OUTPATIENT
Start: 2018-11-23 | End: 2018-11-23 | Stop reason: RX

## 2018-11-23 RX ORDER — AMOXICILLIN 250 MG
2 CAPSULE ORAL 2 TIMES DAILY
Status: DISCONTINUED | OUTPATIENT
Start: 2018-11-23 | End: 2018-11-29 | Stop reason: HOSPADM

## 2018-11-23 RX ORDER — SODIUM CHLORIDE, SODIUM LACTATE, POTASSIUM CHLORIDE, CALCIUM CHLORIDE 600; 310; 30; 20 MG/100ML; MG/100ML; MG/100ML; MG/100ML
INJECTION, SOLUTION INTRAVENOUS CONTINUOUS
Status: DISCONTINUED | OUTPATIENT
Start: 2018-11-23 | End: 2018-11-26

## 2018-11-23 RX ORDER — ALBUTEROL SULFATE 0.83 MG/ML
2.5 SOLUTION RESPIRATORY (INHALATION) EVERY 6 HOURS PRN
Status: DISCONTINUED | OUTPATIENT
Start: 2018-11-23 | End: 2018-11-29 | Stop reason: HOSPADM

## 2018-11-23 RX ORDER — HYDROCODONE BITARTRATE AND ACETAMINOPHEN 5; 325 MG/1; MG/1
1-2 TABLET ORAL EVERY 4 HOURS PRN
Status: DISCONTINUED | OUTPATIENT
Start: 2018-11-23 | End: 2018-11-29 | Stop reason: HOSPADM

## 2018-11-23 RX ORDER — ACETAMINOPHEN 650 MG/1
650 SUPPOSITORY RECTAL EVERY 4 HOURS PRN
Status: DISCONTINUED | OUTPATIENT
Start: 2018-11-23 | End: 2018-11-29 | Stop reason: HOSPADM

## 2018-11-23 RX ORDER — MAGNESIUM CARB/ALUMINUM HYDROX 105-160MG
148 TABLET,CHEWABLE ORAL
Status: DISCONTINUED | OUTPATIENT
Start: 2018-11-23 | End: 2018-11-29 | Stop reason: HOSPADM

## 2018-11-23 RX ORDER — DEXAMETHASONE 4 MG/1
4 TABLET ORAL EVERY 6 HOURS
Status: DISCONTINUED | OUTPATIENT
Start: 2018-11-23 | End: 2018-11-23

## 2018-11-23 RX ORDER — NICOTINE POLACRILEX 4 MG
15-30 LOZENGE BUCCAL
Status: DISCONTINUED | OUTPATIENT
Start: 2018-11-23 | End: 2018-11-29 | Stop reason: HOSPADM

## 2018-11-23 RX ORDER — NALOXONE HYDROCHLORIDE 0.4 MG/ML
.1-.4 INJECTION, SOLUTION INTRAMUSCULAR; INTRAVENOUS; SUBCUTANEOUS
Status: DISCONTINUED | OUTPATIENT
Start: 2018-11-23 | End: 2018-11-27

## 2018-11-23 RX ORDER — HEPARIN SODIUM (PORCINE) LOCK FLUSH IV SOLN 100 UNIT/ML 100 UNIT/ML
5 SOLUTION INTRAVENOUS
Status: DISCONTINUED | OUTPATIENT
Start: 2018-11-23 | End: 2018-11-29 | Stop reason: HOSPADM

## 2018-11-23 RX ADMIN — IOPAMIDOL 100 ML: 755 INJECTION, SOLUTION INTRAVENOUS at 22:19

## 2018-11-23 RX ADMIN — LIDOCAINE: 40 CREAM TOPICAL at 20:47

## 2018-11-23 RX ADMIN — SODIUM CHLORIDE, POTASSIUM CHLORIDE, SODIUM LACTATE AND CALCIUM CHLORIDE 500 ML: 600; 310; 30; 20 INJECTION, SOLUTION INTRAVENOUS at 23:13

## 2018-11-23 RX ADMIN — DEXAMETHASONE 4 MG: 4 TABLET ORAL at 20:02

## 2018-11-23 RX ADMIN — LEVETIRACETAM 500 MG: 500 TABLET, FILM COATED ORAL at 20:02

## 2018-11-23 RX ADMIN — BUSPIRONE HYDROCHLORIDE 10 MG: 10 TABLET ORAL at 20:04

## 2018-11-23 RX ADMIN — ATORVASTATIN CALCIUM 10 MG: 10 TABLET, FILM COATED ORAL at 23:11

## 2018-11-23 RX ADMIN — SENNOSIDES AND DOCUSATE SODIUM 1 TABLET: 8.6; 5 TABLET ORAL at 20:02

## 2018-11-23 RX ADMIN — ALTEPLASE 2 MG: 2.2 INJECTION, POWDER, LYOPHILIZED, FOR SOLUTION INTRAVENOUS at 23:43

## 2018-11-23 RX ADMIN — SODIUM CHLORIDE, PRESERVATIVE FREE 5 ML: 5 INJECTION INTRAVENOUS at 21:28

## 2018-11-23 RX ADMIN — SODIUM CHLORIDE 68 ML: 9 INJECTION, SOLUTION INTRAVENOUS at 22:20

## 2018-11-23 RX ADMIN — DEXAMETHASONE 4 MG: 4 TABLET ORAL at 23:11

## 2018-11-23 ASSESSMENT — ACTIVITIES OF DAILY LIVING (ADL)
AMBULATION: 2 - ASSISTIVE PERSON
EATING: 0 - INDEPENDENT
ADLS_ACUITY_SCORE: 13
TOILETING: 0-->INDEPENDENT
TOILETING: 0 - INDEPENDENT
COMMUNICATION: 0 - UNDERSTANDS/COMMUNICATES WITHOUT DIFFICULTY
RETIRED_EATING: 0-->INDEPENDENT
COGNITION: 0 - NO COGNITION ISSUES REPORTED
CHANGE_IN_FUNCTIONAL_STATUS_SINCE_ONSET_OF_CURRENT_ILLNESS/INJURY: YES
BATHING: 0-->INDEPENDENT
WHICH_OF_THE_ABOVE_FUNCTIONAL_RISKS_HAD_A_RECENT_ONSET_OR_CHANGE?: AMBULATION;TRANSFERRING
BATHING: 0 - INDEPENDENT
DRESS: 0-->INDEPENDENT
TRANSFERRING: 2 - ASSISTIVE PERSON
SWALLOWING: 0 - SWALLOWS FOODS/LIQUIDS WITHOUT DIFFICULTY
FALL_HISTORY_WITHIN_LAST_SIX_MONTHS: NO
RETIRED_COMMUNICATION: 0-->UNDERSTANDS/COMMUNICATES WITHOUT DIFFICULTY
TRANSFERRING: 0-->INDEPENDENT
AMBULATION: 0-->INDEPENDENT
DRESS: 0 - INDEPENDENT
SWALLOWING: 0-->SWALLOWS FOODS/LIQUIDS WITHOUT DIFFICULTY

## 2018-11-23 NOTE — LETTER
Transition Communication Hand-off for Care Transitions to Next Level of Care Provider    Name: Raphael Marin  : 1980  MRN #: [id number]  Primary Care Provider: Lul Guerrero  Primary Care MD Name: Lul Guerrero MD  Primary Clinic: [address]   [place] [address]  Primary Care Clinic Name: [facility]  Reason for Hospitalization:  Failure to thrive in adult [R62.7]  Admit Date/Time: 2018  4:48 PM  Discharge Date: 18  Payor Source: Payor: BLUE PLUS / Plan: BLUE PLUS MA / Product Type: HMO /     Readmission Assessment Measure (ELADIO) Risk Score/category: Low    Plan of Care Goals/Milestone Events:            Reason for Communication Hand-off Referral: Fragility , Comfort Cares - end of life    Discharge Plan:  Discharge Plan:       Most Recent Value    Disposition Comments Family to contact [facility]  for  PCA services    Concerns To Be Addressed all concerns addressed in this encounter           Concern for non-adherence with plan of care:   No  Discharge Needs Assessment:  Needs       Most Recent Value    Anticipated Changes Related to Illness none    Equipment Currently Used at Home none    Transportation Available ambulance              Follow-up plan:  Future Appointments  Date Time Provider Department Center   2018 3:50 PM Tech, Lr [facility]   2018 3:50 PM Tech, Lr [facility]       Any outstanding tests or procedures:        Referrals     Future Labs/Procedures    Home Care Referral     Comments:    Your provider has referred you to: [facility]: [facility] [phone]   [email or web address]    Extended Emergency Contact Information  Primary Emergency Contact: CHARMAINE SAUL  Address: [address] [place] [address] [facility]  Mobile Phone: [phone]  Relation: Mother  Secondary Emergency Contact: Charles Saul  (jacobo)   [place]  Mobile Phone: [phone]  Relation: None    Patient Anticipated Discharge Date: 11/26/2018   RN, PT, HHA to begin 24 - 48 hours after discharge.  PLEASE EVALUATE AND TREAT (Evaluation timeline is 24 - 48 hrs. Please call if there is need for a variance to this timeline).    REASON FOR REFERRAL: Palliative Care Home Care Diagnosis:  Mets, Primary clear cell carcinoma of kidney, left stage iv, weakness and anorexia    ADDITIONAL SERVICES NEEDED: PT    OTHER PERTINENT INFORMATION: Patient was last seen by provider on 11/25/2018 for Mets, Primary clear cell carcinoma of kidney, left stage iv, weakness and anorexia.    Current Outpatient Prescriptions:  bisacodyl (DULCOLAX) 5 MG EC tablet, Take 2 tablets (10 mg) by mouth daily as needed (constipation not controlled by Senna and Miralax), Disp: 60 tablet, Rfl: 3  dexamethasone (DECADRON) 4 MG tablet, Take 2 tablets (8 mg) by mouth 2 times daily (with meals) Do not stop suddenly; drug must be tapered down., Disp: 120 tablet, Rfl: 1  dronabinol (MARINOL) 2.5 MG capsule, Take 1 capsule (2.5 mg) by mouth 2 times daily, Disp: 60 capsule, Rfl: 0  melatonin 1 MG TABS tablet, Take 1 tablet (1 mg) by mouth nightly as needed for sleep, Disp: 60 tablet, Rfl: 1  mirtazapine (REMERON) 15 MG tablet, Take 1 tablet (15 mg) by mouth At Bedtime, Disp: 30 tablet, Rfl: 3  ondansetron (ZOFRAN-ODT) 4 MG ODT tab, Take 1 tablet (4 mg) by mouth every 6 hours as needed for nausea or vomiting, Disp: 120 tablet, Rfl: 3  polyethylene glycol (MIRALAX/GLYCOLAX) powder, Take 17 g by mouth daily May increase dose to twice daily if necessary., Disp: 500 g, Rfl: 3  prochlorperazine (COMPAZINE) 10 MG tablet, Take 1 tablet (10 mg) by mouth every 6 hours as needed (nausea/vomiting not controlled by Ondansetron (OK to take both)), Disp: 90 tablet, Rfl: 3  senna-docusate (SENOKOT-S;PERICOLACE) 8.6-50 MG per tablet, Take 2 tablets by mouth At Bedtime May increase to 2 tabs twice  daily if necessary., Disp: 100 tablet, Rfl: 3      Patient Active Problem List:     Type 2 diabetes mellitus treated without insulin (H)     Primary clear cell carcinoma of kidney, left (H)     Essential hypertension with goal blood pressure less than 130/80     Anxiety disorder due to general medical condition     Failure to thrive in adult     Abdominal pain, generalized     Medical cannabis--Certified eligible on 11/25/18      Documentation of Face to Face and Certification for Home Health Services    I certify that patient, Raphael Marin is under my care and that I, or a Nurse Practitioner or Physician's Assistant working with me, had a face-to-face encounter that meets the physician face-to-face encounter requirements with this patient on: 11/25/2018.    This encounter with the patient was in whole, or in part, for the following medical condition, which is the primary reason for Home Health Care: Palliative.    I certify that, based on my findings, the following services are medically necessary Home Health Services: Physical Therapy    My clinical findings support the need for the above services because: Physical Therapy Services are needed to assess and treat the following functional impairments: weakness.    Further, I certify that my clinical findings support that this patient is homebound (i.e. absences from home require considerable and taxing effort and are for medical reasons or [employer] services or infrequently or of short duration when for other reasons) because: Requires assistance of another person or specialized equipment to access medical services because patient: Requires supervision of another for safe transfer..    Based on the above findings, I certify that this patient is confined to the home and needs intermittent skilled nursing care, physical therapy and/or speech therapy.  The patient is under my care, and I have initiated the establishment of the plan of care.  This patient will be followed  by a physician who will periodically review the plan of care.    Physician/Provider to provide follow up care: Lul Guerrero    Responsible PECOS certified Physician at time of discharge: Lloyd Canales MD    Please be aware that coverage of these services is subject to the terms and limitations of your health insurance plan.  Call member services at your health plan with any benefit or coverage questions.            Key Recommendations:      GINETTE Lyle    AVS/Discharge Summary is the source of truth; this is a helpful guide for improved communication of patient story

## 2018-11-23 NOTE — IP AVS SNAPSHOT
MRN:[id number]                      After Visit Summary   11/23/2018    Raphael Marin    MRN: [id number]           Thank you!     Thank you for choosing [facility] for your care. Our goal is always to provide you with excellent care. Hearing back from our patients is one way we can continue to improve our services. Please take a few minutes to complete the written survey that you may receive in the mail after you visit with us. Thank you!        Patient Information     Date Of Birth          1980        Designated Caregiver       Most Recent Value    Caregiver    Will someone help with your care after discharge? yes    Name of designated caregiver Max    Phone number of caregiver [phone]    Caregiver address [place]      About your hospital stay     You were admitted on:  November 23, 2018 You last received care in the:  [facility]    You were discharged on:  November 29, 2018        Reason for your hospital stay       You came in with generalized weakness and having a difficult time accomplishing activities of daily living on your own due to your underlying cancer, as well as with low oxygen levels and respiratory problems.  You have been able to regain some strength, your breathing and oxygen are better, and you have no established care with the [facility] team, who will be providing care for you in your mom's home starting today.  You're now ready for discharge.                  Who to Call     For medical emergencies, please call 911.  For non-urgent questions about your medical care, please call your primary care provider or clinic, [phone]          Attending Provider     Provider Specialty    Lloyd Canales MD [facility], Surinder Amaro MD Pulmonary       Primary Care Provider Office Phone # Fax #    Lul Guerrero [phone]      After Care Instructions     Activity       Your activity upon discharge:  activity as tolerated.            Diet       Follow this diet upon discharge: Orders Placed This Encounter      Snacks/Supplements Adult: Boost Plus; Between Meals      Regular Diet Adult            Oxygen Adult       [place] Oxygen Order 5 liter(s) by nasal cannula continuously with use of portable tank. Expected treatment length is indefinite (99 months).. Test on conserving device as applicable.    Patients who qualify for home O2 coverage under the CMS guidelines require ABG tests or O2 sat readings obtained closest to, but no earlier than 2 days prior to the discharge, as evidence of the need for home oxygen therapy. Testing must be performed while patient is in the chronic stable state. See notes for O2 sats.    I certify that this patient, Raphael Marin has been under my care and that I, or a nurse practitioner or physician's assistant working with me, had a face-to-face encounter that meets the face-to-face encounter requirements with this patient on 11/29/2018. The patient, Raphael Marin was evaluated or treated in whole, or in part, for the following medical condition, which necessitates the use of the ordered oxygen. Treatment Diagnosis: Metastatic lung disease.    Attending Provider: Surinder Rivera  Physician signature: See electronic signature associated with these discharge orders  Date of Order: November 29, 2018                  Follow-up Appointments     Follow-up and recommended labs and tests        Follow-up with [facility] in your home is already arranged for later today.                  Additional Services     Home Care Referral       Your provider has referred you to: [facility]: [facility] [phone]   [email or web address]    Extended Emergency Contact Information  Primary Emergency Contact: CHARMAINE RETANA  Address: [address] [place] [address]  Mobile Phone: [phone]  Relation: Mother  Secondary  Emergency Contact: Charles Saul (jacobo)   [place]  Mobile Phone: [phone]  Relation: None    Patient Anticipated Discharge Date: 11/26/2018   RN, PT, HHA to begin 24 - 48 hours after discharge.  PLEASE EVALUATE AND TREAT (Evaluation timeline is 24 - 48 hrs. Please call if there is need for a variance to this timeline).    REASON FOR REFERRAL: Palliative Care Home Care Diagnosis:  Mets, Primary clear cell carcinoma of kidney, left stage iv, weakness and anorexia    ADDITIONAL SERVICES NEEDED: PT    OTHER PERTINENT INFORMATION: Patient was last seen by provider on 11/25/2018 for Mets, Primary clear cell carcinoma of kidney, left stage iv, weakness and anorexia.    Current Outpatient Prescriptions:  bisacodyl (DULCOLAX) 5 MG EC tablet, Take 2 tablets (10 mg) by mouth daily as needed (constipation not controlled by Senna and Miralax), Disp: 60 tablet, Rfl: 3  dexamethasone (DECADRON) 4 MG tablet, Take 2 tablets (8 mg) by mouth 2 times daily (with meals) Do not stop suddenly; drug must be tapered down., Disp: 120 tablet, Rfl: 1  dronabinol (MARINOL) 2.5 MG capsule, Take 1 capsule (2.5 mg) by mouth 2 times daily, Disp: 60 capsule, Rfl: 0  melatonin 1 MG TABS tablet, Take 1 tablet (1 mg) by mouth nightly as needed for sleep, Disp: 60 tablet, Rfl: 1  mirtazapine (REMERON) 15 MG tablet, Take 1 tablet (15 mg) by mouth At Bedtime, Disp: 30 tablet, Rfl: 3  ondansetron (ZOFRAN-ODT) 4 MG ODT tab, Take 1 tablet (4 mg) by mouth every 6 hours as needed for nausea or vomiting, Disp: 120 tablet, Rfl: 3  polyethylene glycol (MIRALAX/GLYCOLAX) powder, Take 17 g by mouth daily May increase dose to twice daily if necessary., Disp: 500 g, Rfl: 3  prochlorperazine (COMPAZINE) 10 MG tablet, Take 1 tablet (10 mg) by mouth every 6 hours as needed (nausea/vomiting not controlled by Ondansetron (OK to take both)), Disp: 90 tablet, Rfl: 3  senna-docusate (SENOKOT-S;PERICOLACE) 8.6-50 MG per tablet, Take 2 tablets by mouth At Bedtime  May increase to 2 tabs twice daily if necessary., Disp: 100 tablet, Rfl: 3      Patient Active Problem List:     Type 2 diabetes mellitus treated without insulin (H)     Primary clear cell carcinoma of kidney, left (H)     Essential hypertension with goal blood pressure less than 130/80     Anxiety disorder due to general medical condition     Failure to thrive in adult     Abdominal pain, generalized     Medical cannabis--Certified eligible on 11/25/18      Documentation of Face to Face and Certification for Home Health Services    I certify that patient, Raphael Marin is under my care and that I, or a Nurse Practitioner or Physician's Assistant working with me, had a face-to-face encounter that meets the physician face-to-face encounter requirements with this patient on: 11/25/2018.    This encounter with the patient was in whole, or in part, for the following medical condition, which is the primary reason for Home Health Care: Palliative.    I certify that, based on my findings, the following services are medically necessary Home Health Services: Physical Therapy    My clinical findings support the need for the above services because: Physical Therapy Services are needed to assess and treat the following functional impairments: weakness.    Further, I certify that my clinical findings support that this patient is homebound (i.e. absences from home require considerable and taxing effort and are for medical reasons or [facility] services or infrequently or of short duration when for other reasons) because: Requires assistance of another person or specialized equipment to access medical services because patient: Requires supervision of another for safe transfer..    Based on the above findings, I certify that this patient is confined to the home and needs intermittent skilled nursing care, physical therapy and/or speech therapy.  The patient is under my care, and I have initiated the establishment of the plan of care.   This patient will be followed by a physician who will periodically review the plan of care.    Physician/Provider to provide follow up care: Lul Guerrero    [facility] certified Physician at time of discharge: Lloyd Canales MD    Please be aware that coverage of these services is subject to the terms and limitations of your health insurance plan.  Call member services at your health plan with any benefit or coverage questions.                  Further instructions from your care team       From Shane Guerrero, Palliative Care NP, Cell [phone]     I sent prescriptions to [facility] and [facility] on Sunday, expecting him to return home then.  So please plan to visit both pharmacies to  meds before Raphael goes home.  Thanks.     Medical Cannabis    Here is the main web site for information on medical cannabis:  [email or web address]     The web site notes that it may take UP TO 30 DAYS for Raphael to hear from the [place] Department of Health notifying him that his registration has been accepted, and to advise of next steps.  Please spend a LOT of time reviewing information for patients on this web site.  It will explain the precautions, locations of dispensaries, etc.  If you investigate the links to particular dispensaries, you will find general information on costs.    Only time and experimentation will tell if and how much this helps Raphael's appetite; the pharmacists who work at the dispensary can also give you information on what they've learned from other patients treated for the same symptom.     11/28: Don't feel like you have to get this.  It's expensive.  I believe one of my patients told me they were told it can take 6 weeks or so before it will start working.  Use the Marinol/dronabinol instead.  Furthermore, it looks like Mirtazapine was also sent to [facility], and it might help Raphael's appetite.      Marinol    This is prescription  "marijuana-like medicine for appetite that appears to be covered by your insurance.  See \"Appetite\" below.     Ritalin = Methylphenidate    This med helps keep you awake.  Take it first thing in the morning and again at 2pm or before.  Don't take it after 2 pm as it can keep you awake at night.    Constipation    To prevent constipation, take 1 cap of Miralax in fluid daily PLUS 2 Senna tabs at bedtime.      Stop the docusate; there is no proof that it works.    TUMS can also be constipating and may not work as well as other meds for heartburn. Your hospice RN can guide you if you want to try something instead of TUMS.    If the above doses of Miralax and Senna don't result in a BM at least every other day, you may increase Miralax to twice daily and Senna up to 4 tabs twice daily.  You can adjust these on your own.  Aim for a standard dose that you take every single day.    If you need an extra boost, Raphael may take 2-3 Bisacodyl 5 mg tabs daily; they usually work within about 4 hours.  Take the Bisacodyl ONLY as NEEDED, not daily.    Nausea    I sent in two different prescriptions for anti-nausea meds:  Ondansetron and Prochlorperazine.  They work in different ways and sometimes people need to take both.  While Raphael was here, he was given Ondansetron.  If that worked here, it should work at home, so start with that drug.  If it doesn't relieve nausea within 1 hour, take the other drug.      Keep track (record the date, time, drug and dose) of each anti-nausea med that Raphael takes on a day by day basis.      Appetite    Dronabinol = Marinol, two different names for the same medicine.  Raphael will be discharged on the 5 mg dose.  Take it twice daily before meals.  It can cause euphoria.  If insurance paid for it, you may try increasing the dose to 3x/day before meals.  Cut back to twice a day if (1) Raphael has mental status changes or (2) if it doesn't work.  This is a controlled drug and requires a \"hard copy\" " prescription with a provider's original signature.  This is synthetic marijuana/THC.      Pain    Poorly controlled pain can deplete one's energy.  I hope Raphael's constipation is sufficiently controlled that he'll be willing to take Norco for pain when he needs it. Also keep track of each dose of Norco taken--date, time, dose.  Hospice can use this information to determine if or when he might benefit from a long-acting opioid. If he gets to taking 6 tabs over a 24-hour period for a couple of consecutive days, it means he would probably benefit from a long-acting opioid.     MISC:    Dexamethasone dose is now 8mg (two 4mg tabs) twice daily.  Take it with breakfast and supper.    The Pantoprazole/Protonix should be taken daily on an empty stomach first thing in the morning.  It turns off the stomach's acid pump, which is put into hyperdrive by the Dexamethasone.  Some people still get stomach aches though--from the Dexamethasone.  So just keep your hospice RN informed.    Metformin dose, here, was reduced to just one 500 mg tab twice a day.  I believe your baseline dose was 1000 mg twice daily.  If you don't mind checking your blood sugar a few times, you and your hospice RN can probably figure out which dose you're going to need.  If you don't want to check your blood sugars, stick with the 500 mg dose twice daily as it's safer to run a HIGH blood sugar than a LOW blood sugar in a circumstance like this.     I stopped the Lipitor.  Raphael's not eating enough to have ANY problems with cholesterol right now.  I'm sure you all wish he were!    There's one prescription I did not write for which Raphael was given once or twice the whole time he was here--Lorazepam.  It's a tranquilizer like Buspar.  Until hospice delivers that medicine, if he feels anxious, just give him an extra dose of Buspar.  I'm pretty sure hospice will be delivering some Lorazepam to the home.     Please call me if you have other questions.      Best  "wishes to you all!    Pending Results     Date and Time Order Name Status Description    11/26/2018 0000 PTH Related Peptide Test In process             Statement of Approval     Ordered          11/29/18 0922  I have reviewed and agree with all the recommendations and orders detailed in this document.  EFFECTIVE NOW     Approved and electronically signed by:  Surinder Rivera MD             Admission Information     Date & Time Provider Department Dept. Phone    11/23/2018 Surinder Rivera MD [facility] [phone]      Your Vitals Were     Blood Pressure Pulse Temperature Respirations Height Weight    121/69 (BP Location: Right arm) 112 98.5  F (36.9  C) (Oral) 24 1.753 m (5' 9\") 96 kg (211 lb 10.3 oz)    Pulse Oximetry BMI (Body Mass Index)                93% 31.25 kg/m2          Care EveryWhere ID     This is your Care EveryWhere ID. This could be used by other organizations to access your [facility] medical records  [id number]        Equal Access to Services     PAO PALAFOX AH: Hadii abdiel elizondo hadasho Soiraali, waaxda luqadaha, qaybta kaalmada adeegyada, stacey pizarro . So [facility] [phone].    ATENCIÓN: Si habla español, tiene a monzon disposición servicios gratuitos de asistencia lingüística. Llame al [phone].    We comply with applicable federal civil rights laws and [place] laws. We do not discriminate on the basis of race, color, national origin, age, disability, sex, sexual orientation, or gender identity.               Review of your medicines      START taking        Dose / Directions    bisacodyl 5 MG EC tablet   Used for:  Slow transit constipation        Dose:  10 mg   Take 2 tablets (10 mg) by mouth daily as needed (constipation not controlled by Senna and Miralax)   Quantity:  60 tablet   Refills:  3       dronabinol 5 MG capsule   Commonly known as:  MARINOL   Used for:  Nausea and vomiting, intractability of vomiting not specified, unspecified " vomiting type, Anorexia        Dose:  5 mg   Take 1 capsule (5 mg) by mouth 2 times daily (before meals) May increase to 3x/day before meals if tolerated   Quantity:  90 capsule   Refills:  1       melatonin 1 MG Tabs tablet        Dose:  1 mg   Take 1 tablet (1 mg) by mouth nightly as needed for sleep   Quantity:  60 tablet   Refills:  1       methylphenidate 5 MG tablet   Commonly known as:  RITALIN   Used for:  Neoplastic (malignant) related fatigue        Dose:  5 mg   Take 1 tablet (5 mg) by mouth 2 times daily   Quantity:  10 tablet   Refills:  0       mirtazapine 15 MG tablet   Commonly known as:  REMERON   Used for:  Anorexia        Dose:  15 mg   Take 1 tablet (15 mg) by mouth At Bedtime   Quantity:  30 tablet   Refills:  3       ondansetron 4 MG ODT tab   Commonly known as:  ZOFRAN-ODT   Used for:  Nausea and vomiting, intractability of vomiting not specified, unspecified vomiting type        Dose:  4 mg   Take 1 tablet (4 mg) by mouth every 6 hours as needed for nausea or vomiting   Quantity:  120 tablet   Refills:  3       prochlorperazine 10 MG tablet   Commonly known as:  COMPAZINE   Used for:  Nausea and vomiting, intractability of vomiting not specified, unspecified vomiting type        Dose:  10 mg   Take 1 tablet (10 mg) by mouth every 6 hours as needed (nausea/vomiting not controlled by Ondansetron (OK to take both))   Quantity:  90 tablet   Refills:  3       senna-docusate 8.6-50 MG tablet   Commonly known as:  SENOKOT-S/PERICOLACE   Used for:  Slow transit constipation        Dose:  2 tablet   Take 2 tablets by mouth At Bedtime May increase to 2 tabs twice daily if necessary.   Quantity:  100 tablet   Refills:  3         CONTINUE these medicines which may have CHANGED, or have new prescriptions. If we are uncertain of the size of tablets/capsules you have at home, strength may be listed as something that might have changed.        Dose / Directions    dexamethasone 4 MG tablet   Commonly known  as:  DECADRON   This may have changed:    - how much to take  - when to take this  - additional instructions   Used for:  Brain metastasis (H)        Dose:  8 mg   Take 2 tablets (8 mg) by mouth 2 times daily (with meals) Do not stop suddenly; drug must be tapered down.   Quantity:  120 tablet   Refills:  1       polyethylene glycol powder   Commonly known as:  MIRALAX/GLYCOLAX   This may have changed:  additional instructions   Used for:  Slow transit constipation        Dose:  17 g   Take 17 g by mouth daily May increase dose to twice daily if necessary.   Quantity:  500 g   Refills:  3         CONTINUE these medicines which have NOT CHANGED        Dose / Directions    albuterol 108 (90 Base) MCG/ACT inhaler   Commonly known as:  PROAIR HFA/PROVENTIL HFA/VENTOLIN HFA        Dose:  2 puff   Inhale 2 puffs into the lungs every 4 hours as needed for shortness of breath / dyspnea or wheezing   Refills:  0       benzonatate 200 MG capsule   Commonly known as:  TESSALON        Dose:  200 mg   Take 200 mg by mouth 3 times daily as needed for cough   Refills:  0       busPIRone 10 MG tablet   Commonly known as:  BUSPAR        Dose:  10 mg   Take 10 mg by mouth 2 times daily   Refills:  0       HYDROcodone-acetaminophen 5-325 MG tablet   Commonly known as:  NORCO        Dose:  1-2 tablet   Take 1-2 tablets by mouth every 4 hours as needed for moderate to severe pain   Refills:  0       levETIRAcetam 500 MG tablet   Commonly known as:  KEPPRA        Dose:  500 mg   Take 500 mg by mouth 2 times daily   Refills:  0       lidocaine-prilocaine 2.5-2.5 % external cream   Commonly known as:  EMLA        Apply topically as needed for moderate pain Apply quarter size amount to port site 30 to 60 minutes prior to access.   Refills:  0       metFORMIN 500 MG 24 hr tablet   Commonly known as:  GLUCOPHAGE-XR        Dose:  1000 mg   Take 1,000 mg by mouth 2 times daily (before meals)   Refills:  0       order for DME   Used for:  Brain  metastasis (H), Metastasis to lung (H)        Equipment being ordered: Hospital Bed Requesting hospital bed for living room on main floor. 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 Dizziness, fall risk, fatigue/malaise, difficulty navigating stairs due to brain mets. Also CERON/SOB affects mobility around home especially up and down stairs.   Quantity:  1 each   Refills:  0       pantoprazole 40 MG EC tablet   Commonly known as:  PROTONIX        Dose:  40 mg   Take 40 mg by mouth daily   Refills:  0         STOP taking     atorvastatin 10 MG tablet   Commonly known as:  LIPITOR           CABOMETYX 60 MG   Generic drug:  Cabozantinib S-Malate           docusate sodium 100 MG capsule   Commonly known as:  COLACE                Where to get your medicines      These medications were sent to [facility] [address] [place] [address]  [address] [facility] [address]     Phone:  [phone]     bisacodyl 5 MG EC tablet    dexamethasone 4 MG tablet    melatonin 1 MG Tabs tablet    mirtazapine 15 MG tablet    ondansetron 4 MG ODT tab    polyethylene glycol powder    prochlorperazine 10 MG tablet    senna-docusate 8.6-50 MG tablet         Some of these will need a paper prescription and others can be bought over the counter. Ask your nurse if you have questions.     Bring a paper prescription for each of these medications     dronabinol 5 MG capsule    methylphenidate 5 MG tablet                Protect others around you: Learn how to safely use, store and throw away your medicines at www.disposemymeds.org.             Medication List: This is a list of all your medications and when to take them. Check marks below indicate your daily home schedule. Keep this list as a reference.      Medications           Morning Afternoon Evening Bedtime As Needed    albuterol 108 (90 Base) MCG/ACT inhaler   Commonly known as:  PROAIR HFA/PROVENTIL HFA/VENTOLIN HFA   Inhale 2  puffs into the lungs every 4 hours as needed for shortness of breath / dyspnea or wheezing                                benzonatate 200 MG capsule   Commonly known as:  TESSALON   Take 200 mg by mouth 3 times daily as needed for cough   Last time this was given:  200 mg on 11/27/2018 10:20 PM                                bisacodyl 5 MG EC tablet   Take 2 tablets (10 mg) by mouth daily as needed (constipation not controlled by Senna and Miralax)                                busPIRone 10 MG tablet   Commonly known as:  BUSPAR   Take 10 mg by mouth 2 times daily   Last time this was given:  10 mg on 11/29/2018  8:22 AM                                dexamethasone 4 MG tablet   Commonly known as:  DECADRON   Take 2 tablets (8 mg) by mouth 2 times daily (with meals) Do not stop suddenly; drug must be tapered down.   Last time this was given:  8 mg on 11/29/2018  8:23 AM                                dronabinol 5 MG capsule   Commonly known as:  MARINOL   Take 1 capsule (5 mg) by mouth 2 times daily (before meals) May increase to 3x/day before meals if tolerated   Last time this was given:  5 mg on 11/29/2018  9:38 AM                                HYDROcodone-acetaminophen 5-325 MG tablet   Commonly known as:  NORCO   Take 1-2 tablets by mouth every 4 hours as needed for moderate to severe pain   Last time this was given:  2 tablets on 11/29/2018 10:18 AM                                levETIRAcetam 500 MG tablet   Commonly known as:  KEPPRA   Take 500 mg by mouth 2 times daily   Last time this was given:  500 mg on 11/29/2018  8:19 AM                                lidocaine-prilocaine 2.5-2.5 % external cream   Commonly known as:  EMLA   Apply topically as needed for moderate pain Apply quarter size amount to port site 30 to 60 minutes prior to access.                                melatonin 1 MG Tabs tablet   Take 1 tablet (1 mg) by mouth nightly as needed for sleep   Last time this was given:  1 mg on  11/27/2018 10:20 PM                                metFORMIN 500 MG 24 hr tablet   Commonly known as:  GLUCOPHAGE-XR   Take 1,000 mg by mouth 2 times daily (before meals)                                methylphenidate 5 MG tablet   Commonly known as:  RITALIN   Take 1 tablet (5 mg) by mouth 2 times daily   Last time this was given:  5 mg on 11/29/2018  8:19 AM                                mirtazapine 15 MG tablet   Commonly known as:  REMERON   Take 1 tablet (15 mg) by mouth At Bedtime   Last time this was given:  15 mg on 11/24/2018  9:50 PM                                ondansetron 4 MG ODT tab   Commonly known as:  ZOFRAN-ODT   Take 1 tablet (4 mg) by mouth every 6 hours as needed for nausea or vomiting                                order for DME   Equipment being ordered: Hospital Bed Requesting hospital bed for living room on main floor. 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 Dizziness, fall risk, fatigue/malaise, difficulty navigating stairs due to brain mets. Also CERON/SOB affects mobility around home especially up and down stairs.                                pantoprazole 40 MG EC tablet   Commonly known as:  PROTONIX   Take 40 mg by mouth daily   Last time this was given:  40 mg on 11/29/2018  6:55 AM                                polyethylene glycol powder   Commonly known as:  MIRALAX/GLYCOLAX   Take 17 g by mouth daily May increase dose to twice daily if necessary.                                prochlorperazine 10 MG tablet   Commonly known as:  COMPAZINE   Take 1 tablet (10 mg) by mouth every 6 hours as needed (nausea/vomiting not controlled by Ondansetron (OK to take both))                                senna-docusate 8.6-50 MG tablet   Commonly known as:  SENOKOT-S/PERICOLACE   Take 2 tablets by mouth At Bedtime May increase to 2 tabs twice daily if necessary.   Last time this was given:  2 tablets on 11/29/2018  7:59 AM

## 2018-11-23 NOTE — IP AVS SNAPSHOT
` `     [facility] INTENSIVE CARE: [phone]                 INTERAGENCY TRANSFER FORM - NOTES (H&P, Discharge Summary, Consults, Procedures, Therapies)   2018                    Hospital Admission Date: 2018  RAPHAEL POLO   : 1980  Sex: Male        Patient PCP Information     Provider PCP Type    Lul Guerrero MD General         History & Physicals      H&P by Joellen Holcomb PA-C at 2018  7:14 PM     Author:  Joellen Holcomb PA-C Service:  Hospitalist Author Type:  Physician Assistant - PATIENCE    Filed:  2018  7:54 PM Date of Service:  2018  7:14 PM Creation Time:  2018  7:14 PM    Status:  Attested :  Joellen Holcomb PA-C (Physician Assistant - PATIENCE)    Cosigner:  Lloyd Canales MD at 2018  5:56 PM        Attestation signed by Lloyd Canales MD at 2018  5:56 PM        Physician Attestation   I, Lloyd Canales, have reviewed and discussed with the advanced practice provider their history, physical and plan for Raphael Polo. I did not participate in a shared visit by interviewing or examining the patient and this should be billed as an advanced practice provider only visit.    Lloyd Canales  Date of Service (when I saw the patient): I did not personally see this patient today.                               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      Failure to  "thrive in adult  Overall not doing well since starting whole brain radiation. Poor oral intake, dehydrated, generally weak. Family is concerned about his safety at home while they are gone at work. Patient was a direct admit request from Dr. Quijano (radiation oncologist) for further evaluation. Basic labs relatively unremarkable, VSS. Low evidence for infection. Work-up still in process.  -  ml bolus, followed by maintenance @ 125 ml/hr  - Social work consult to help obtain resources such as home health care  - PT/OT evaluations  - Nutrition consult to help address poor appetite      Abdominal pain, generalized  Poor oral intake  Postprandial fullness  Patient with inconsistent bowel movements, nausea, and vomiting. Develops postprandial fullness after just a few bites of food. Afebrile, minimal leukocytosis (WBC 11.1) upon admission. Tender on exam but not an acute abdomen. Concern for possible small bowel obstruction or other pathologic finding.  - Get CT chest, abdomen, pelvis to further evaluate  - Prn antiemetics available  - Bowel regimen (will put on hold if patient has small bowel obstruction)  - Prn pain medications available - Norco and dilaudid  - Nutrition consult as above  - Consider appetite stimulants if CT is normal      Generalized weakness, risk for falls  Will be alone all day starting this week while family is at work. Family concerned about patient's safety with his weakness. No focal weakness on exam. Brain mets likely affecting patient's balance and overall function. Recently had MRI at [facility] on 11/2/18 - low concern for CVA or other acute abnormality, no need to repeat imaging.   - PT/OT evaluations  - SW consult, would likely benefit from home health care      Cough   Chronic cough. Known lung mets. Chest CT on 11/14 [facility] with \"numerous areas of apparent lung consolidation... Likely related to metastases although slightly atypical.\" Afebrile, WBC 11.1, no hypoxia - low " suspicion for pneumonia. Cough managed prior to admission with Tessalon and albuterol without much relief.  - Continue Tessalon  - CT chest/abdomen/pelvis pending  - Prn albuterol neb available      Tachycardia   Mild, rates in low 100's. Appears to have baseline heart rate in low 100's.  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 S/p left nephrectomy in June 2018, s/p brain tumor resection on 11/1/18 - all through [facility]. Follows with oncology Dr. Rodriguez through [facility], but is doing radiation at [facility], which is closer to home. On day of admission ([KW1.1]11/23/2018[KW1.2]) he received round 6 of 10 of whole brain radiation. Has been prescribed carozantinib for RCC but has not yet started taking it. Receives decadron 4 mg q 6 hours prior to admission for radiation induced inflammation. On Keppra prior to admission.  - Hold prior to admission carozantinib  - Continue prior to admission decadron 4 mg q 6 hours  - Continue prior to admission Keppra  - Continue with outpatient management      Type 2 diabetes mellitus treated without insulin  Managed with metformin 1000 mg bid. Admit glucose = 239.  - HgbA1c pending  - Hold prior to admission metformin  - High dose sliding scale insulin  - Hyper/hypoglycemia protocol      Essential hypertension with goal blood pressure less than 130/80  Pressures reviewed, stable. Not on antihypertensive medications prior to admission.  - Observe off of medications for now      Anxiety disorder due to general medical condition  Stable mood upon admission. Managed prior to admission with Buspar, continue.      Fluids:  ml  Bolus, then maintenance @ 125  Electrolytes: Monitor  Nutrition: Clear liquid    DVT Prophylaxis: Pneumatic Compression Devices  Code Status: Full Code - discussed directly with patient and his family    Lines: Port  Puentes catheter: Not indicated    Disposition: Anticipate  discharge in 2+ day(s). Appropriate for inpatient care - unlikely able to return home in next 2 days with safety concerns.    Discussion: Assessment & plan discussed with Dr. Edgard Canales.    Joellen Holcomb PA-C  [place] Hospitalist Service  Pager: [phone]          Primary Care Physician   Lul Guerrero SHANTE [phone]    History is obtained from the patient and review of old records via the EMR.    Past Medical History      Past Medical History:   Diagnosis Date     Brain metastases (H)     resection 11/1/18 [place]/Care Everywhere     Renal cell carcinoma (H)     s/p left nephrectomy     Patient Active Problem List    Diagnosis Date Noted     Primary clear cell carcinoma of kidney, left (H) 07/27/2018     Priority: Medium     Overview:   Left renal clear cell carcinoma, pT3aN1; s/p nephrectomy; metastatic to lungs, lymph nodes, adrenal gland  1. May 17, 2018, left flank pain and gross hematuria leads to emergency room visit and CT abdomen and pelvis without contrast showing right lung nodules in the lung bases up to 2.1 cm, fatty liver, small splenial and large soft tissue mass arising in the upper anterior left kidney at 11 cm. There is also a retroperitoneal lymph node in the left periaortic region up to 1.8 cm. CT chest, abdomen and pelvis with contrast done later that same visit again showing a left cm mass, right-sided indeterminate nodules and the prominent periaortic lymph node and probable benign vascular lesions in the liver.    2. June 27, 2018, left radical nephrectomy with final pathology showing a 10 cm renal clear-cell carcinoma, grade 4 of 4, tumor invading into the perirenal adipose tissue and renal pelvis as well as the renal vein but all surgical margins negative, left adrenal gland negative, single left retroperitoneal lymph node is removed and positive for metastatic clear cell carcinoma measuring 0.7 cm, stage III (pT3a N1).   3. July 10, 2018, postoperative creatinine  "1.38 but creatinine clearance still greater than 60.   4. July 27, 2018: Medical Oncology consultation; recommendation for restaging CT and adjuvant sunitinib if negative.  5. August 8, 2018: Restaging CT shows progression in size and number of multiple lung nodules, increased paraaortic adenopathy, and left adrenal nodule; all consistent with metastatic disease. Plans for sunitinib aborted.  6. August 14, 2018: Ipilumumab/nivolumab. 3 cycles received.  7. October 17, 2018: On routine visit for cycle 4 ipilumumab/nivolumab, complains of headache and right vision loss (that had resolved); urgent CT head without contrast and then MRI brain with and without contrast that day show multiple brain metastases with one dominant mass in the right frontal lobe measuring 3.5 cm x 3.5 cm x 3.3 cm with significant vasogenic edema, mass effect, and midline shift. Urgent referral to Neurosurgery placed for large symptomatic right frontal lobe mass as well as referral to [facility] Radiation Oncology for SRS consideration versus whole brain radiation for remaining lesions.      Last Assessment & Plan:   Left renal clear cell carcinoma, pT3aN1; s/p nephrectomy; metastatic to lungs, lymph nodes, adrenal gland  New headache he describes as \"the worst of my life\" that he awoke with on 10/12 and was associated with right vision loss 10/14 is concerning, although the vision loss has now resolved; could be just migraine although he personally has never had migraines.  Other new issues is elevated calcium which I am not able to tie in to the headache.  Last MRI head was August and negative but I think we need to get CT head without contrast STAT today before proceeding with next immunotherapy.    ADDENDUM: CT head without contrast shows evidence of brain metastases; patient then sent back for STAT MRI brain with and without contrast that confirms multiple brain metastases with one dominant mass in the right frontal lobe measuring 3.5 cm x 3.5 " cm x 3.3 cm with significant vasogenic edema, mass effect, and midline shift. After discussion with Dr. Drew Valdovinos, urgent referral to him (Neurosurgery) placed for large symptomatic right frontal lobe mass as well as referral to [facility] Radiation Oncology for SRS consideration versus whole brain radiation for remaining lesions after presumed resection of the right frontal lobe metastasis. Dexamethasone 10 mg IV given today and PO prescription for 4 mg PO TID and pantoprazole 40 mg PO daily sent. Ipilumumab/nivolumab is cancelled, but I did leave the follow up to see me in 3 weeks with new CT chest/abdomen/pelvis as previously scheduled although it is distinctly possible we will need to move that back.        Anxiety disorder due to general medical condition 07/10/2018     Priority: Medium     Overview:   History of mood issues without any benefit from SSRIs beyond buspirone which is helpful for anxiety.  Current anxiety symptoms related to his diagnosis of renal cell carcinoma       Type 2 diabetes mellitus treated without insulin (H) 04/19/2018     Priority: Medium     Overview:   No retinopathy noted on eye exam. 04/27/2018 Shashank Almanza, OD       Essential hypertension with goal blood pressure less than 130/80 09/16/2014     Priority: Medium        Past Surgical History     Past Surgical History:   Procedure Laterality Date     BRAIN TUMOR RESECTION Right 11/01/2018     NEPHRECTOMY Left 06/2018        History of Present Illness   Raphael Marin is a 38 year old male with the above past medical history now presents on 11/23/2018 with abdominal pain, poor oral intake, generalized weakness, and safety concerns.     Patient was diagnosed with renal cell carcinoma in May 2018. He follows with Dr. Rodriguez through [facility] system. He has left nephrectomy in June 2018. He developed headaches and was found to have mets to brain and lungs. On 11/1/18 he underwent right craniotomy for tumor resection. He is currently  "receiving whole brain radiation through Dr. Quijano at [facility] as it is closer to home for him. He has completed 6 of 10 rounds of radiation thus far, last radiation course was today[KW1.1] 11/23/2018[KW1.3].    Since starting his radiation he has become progressively weaker, has lost his appetite, and has not been doing well at home. He denies any falls, but feels so weak that he has nearly fallen on a few occasions. He denies dizziness or syncope. Since his tumor resection he has some right arm weakness, but otherwise no focal weakness.    His abdominal problems started about a week ago. He often feels nauseated and \"gaggy,\" and on multiple occasions he has vomited his food, drink, or medications. No hematemesis. He has not been eating or drinking much. He develops post prandial fullness after only a few bites of food. He has difficulty with bowel movements, although his last bowel movement was this morning. He denies melena or hematochezia. He feels very bloated and at times constipated.     He has a chronic cough for the past few months with shortness of breath. He was told this is possibly related to his lung mets. He has tried some cough suppressants without much relief. No wheezing, fevers, chills.    Patient lives with his parents. Starting in 2 days, they will both be gone during the day at work. They are concerned about patient's safety being home alone all day with his current state of health.     The remainder review of systems is negative.      Prior to Admission Medications   Prior to Admission Medications   Prescriptions Last Dose Informant Patient Reported? Taking?   Cabozantinib S-Malate (CABOMETYX) 60 MG not started Self Yes Yes   Sig: Take 60 mg by mouth daily   HYDROcodone-acetaminophen (NORCO) 5-325 MG per tablet Past Month at Unknown time Self Yes Yes   Sig: Take 1-2 tablets by mouth every 4 hours as needed for moderate to severe pain    albuterol (PROAIR HFA/PROVENTIL HFA/VENTOLIN HFA) 108 " (90 Base) MCG/ACT inhaler 11/23/2018 at am Self Yes Yes   Sig: Inhale 2 puffs into the lungs every 4 hours as needed for shortness of breath / dyspnea or wheezing   atorvastatin (LIPITOR) 10 MG tablet 11/22/2018 at hs Self Yes Yes   Sig: Take 10 mg by mouth daily   benzonatate (TESSALON) 200 MG capsule Past Month at Unknown time Self Yes Yes   Sig: Take 200 mg by mouth 3 times daily as needed for cough    busPIRone (BUSPAR) 10 MG tablet 11/23/2018 at am Self Yes Yes   Sig: Take 10 mg by mouth 2 times daily   dexamethasone (DECADRON) 4 MG tablet 11/23/2018 at Unknown time Self No Yes   Sig: Take 1 tablet (4 mg) by mouth every 8 hours   Patient taking differently: Take 4 mg by mouth every 6 hours    docusate sodium (COLACE) 100 MG capsule 11/23/2018 at am Self Yes Yes   Sig: Take 100 mg by mouth 2 times daily as needed   levETIRAcetam (KEPPRA) 500 MG tablet 11/23/2018 at am Self Yes Yes   Sig: Take 500 mg by mouth 2 times daily   lidocaine-prilocaine (EMLA) cream  Self Yes Yes   Sig: Apply topically as needed for moderate pain Apply quarter size amount to port site 30 to 60 minutes prior to access.   metFORMIN (GLUCOPHAGE-XR) 500 MG 24 hr tablet 11/23/2018 at am Self Yes Yes   Sig: Take 1,000 mg by mouth 2 times daily (before meals)   order for DME  Self No No   Sig: Equipment being ordered: Hospital Bed  Requesting hospital bed for living room on main floor. 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 Dizziness, fall risk, fatigue/malaise, difficulty navigating stairs due to brain mets. Also CERON/SOB affects mobility around home especially up and down stairs.   pantoprazole (PROTONIX) 40 MG EC tablet 11/23/2018 at am Self Yes Yes   Sig: Take 40 mg by mouth daily   polyethylene glycol (MIRALAX/GLYCOLAX) powder 11/23/2018 at am Self Yes Yes   Sig: Take 17 g by mouth daily      Facility-Administered Medications: None     Allergies   No Known Allergies    Family History     Family History   Problem Relation Age of Onset     Retinal detachment Mother      Cataracts Mother      Pancreatic Cancer Father      Diabetes Paternal Grandmother        Social History   Social History     Social History     Marital status:      Spouse name: N/A     Number of children: N/A     Years of education: N/A     Occupational History     Not on file.     Social History Main Topics     Smoking status: Former Smoker     Types: Cigarettes     Quit date: 2012     Smokeless tobacco: Never Used     Alcohol use No     Drug use: No     Sexual activity: Not on file     Other Topics Concern     Not on file     Social History Narrative       Review of Systems     A complete 10 point review of systems was negative except for items noted in the HPI/subjective.    Physical Exam   /89 (BP Location: Right arm)  Pulse 108  Temp 98.2  F (36.8  C) (Oral)  Resp 18  SpO2 93%     Weight: 0 lbs 0 oz There is no height or weight on file to calculate BMI.     Constitutional: Alert, oriented, cooperative, no apparent distress, appears nontoxic, speaking in full sentences.    Eyes: Eyes are clear, pupils are reactive. No scleral icterus. Extroccular movements intact.     HEENT: Oropharynx is clear and moist, no lesions. Large right frontal cranial scar present, well healing.     Cardiovascular: Regular rhythm and rate, normal S1 and S2. No murmur, rubs, or gallops. Peripheral pulses in tact bilaterally. No lower extremity edema.    Respiratory: Faint crackles at right base. No wheezes or rhonchi.    GI: Soft, distended. Minimally tender to palpation of all quadrants, no rebound or guarding. No hepatosplenomegaly or masses appreciated. Hypoactive bowel sounds.     Musculoskeletal: Without obvious deformity, normal range of motion. Normal muscle bulk and tone.     Skin: Warm and dry, no rashes or ecchymoses. No mottling of skin. Cranial scar as above.     Neurologic: Patient moves all extremities. Cranial nerves are  grossly intact.  is symmetric. Gross strength and sensation are equal bilaterally.    Genitourinary: Deferred    Data   Data reviewed today:     Recent Labs  Lab 11/23/18  1801   WBC 11.1*   HGB 15.2   MCV 86         POTASSIUM 4.5   CHLORIDE 101   CO2 24   BUN 33*   CR 1.10   ANIONGAP 11   MED 12.5*   *       No results found for this or any previous visit (from the past 24 hour(s)).    I personally reviewed no images or EKG's today.    Joellen Holcomb PA-C  [facility] Hospitalist Service  Pager: [phone][KW1.1]        Revision History        User Key Date/Time User Provider Type Action    > KW1.3 11/23/2018  7:54 PM Joellen Holcomb PA-C Physician Assistant - C Sign     KW1.2 11/23/2018  7:21 PM Joellen Holcomb PA-C Physician Assistant Luke MORIN      KW1.1 11/23/2018  7:14 PM Joellen Holcomb PA-C Physician Assistant - PATIENCE                      Discharge Summaries      Discharge Summaries by Surinder Rivera MD at 11/29/2018  9:29 AM     Author:  Surinder Rivera MD Service:  Hospitalist Author Type:  Physician    Filed:  11/29/2018  9:42 AM Date of Service:  11/29/2018  9:29 AM Creation Time:  11/29/2018  9:22 AM    Status:  Signed :  Surinder Rivera MD (Physician)         [facility]    Discharge Summary  Hospital Medicine    Date of Admission:  11/23/2018  Date of Discharge:  11/29/2018   Discharging Provider: Surinder Rivera  Date of Service: 11/29/2018      Primary Care     Lul Guerrero  [address] [place] [address]      Identification and Chief Compaint:  Raphael Marin is a 38 year old male with a past medical history significant for 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    Discharge Diagnoses       Declining functional status    Type 2 diabetes mellitus treated without insulin (H)    Primary clear cell carcinoma of kidney, left (H)    Essential hypertension with goal blood pressure less than 130/80    Anxiety disorder due to general medical condition    Abdominal pain, generalized    Medical cannabis--Certified eligible on 11/25/18    Hypercalcemia      Discharge Disposition   Discharged to home to the care of [facility].    Discharge Orders     Home Care Referral     Reason for your hospital stay   You came in with generalized weakness and having a difficult time accomplishing activities of daily living on your own due to your underlying cancer, as well as with low oxygen levels and respiratory problems.  You have been able to regain some strength, your breathing and oxygen are better, and you have no established care with the [facility] team, who will be providing care for you in your mom's home starting today.  You're now ready for discharge.     Follow-up and recommended labs and tests    Follow-up with [facility] in your home is already arranged for later today.     Activity   Your activity upon discharge: activity as tolerated.     DNR/DNI     Oxygen Adult   [facility] Oxygen Order 5 liter(s) by nasal cannula continuously with use of portable tank. Expected treatment length is indefinite (99 months).. Test on conserving device as applicable.    Patients who qualify for home O2 coverage under the CMS guidelines require ABG tests or O2 sat readings obtained closest to, but no earlier than 2 days prior to the discharge, as evidence of the need for home oxygen therapy. Testing must be performed while patient is in the chronic stable state. See notes for O2 sats.    I certify that this patient, Raphael Marin has been under my care and that I, or a nurse practitioner or physician's assistant working  with me, had a face-to-face encounter that meets the face-to-face encounter requirements with this patient on 11/29/2018. The patient, Raphael Marin was evaluated or treated in whole, or in part, for the following medical condition, which necessitates the use of the ordered oxygen. Treatment Diagnosis: Metastatic lung disease.    Attending Provider: Surinder Rivera  Physician signature: See electronic signature associated with these discharge orders  Date of Order: November 29, 2018     Diet   Follow this diet upon discharge: Orders Placed This Encounter     Snacks/Supplements Adult: Boost Plus; Between Meals     Regular Diet Adult           Further instructions from your care team       From Shane Guerrero, Palliative Care NP, Cell [phone]     I sent prescriptions to [facility] and [facility] on Sunday, expecting him to return home then.  So please plan to visit both pharmacies to  meds before Raphael goes home.  Thanks.     Medical Cannabis    Here is the main web site for information on medical cannabis:  [email or web address]     The web site notes that it may take UP TO 30 DAYS for Raphael to hear from the [place] Department of Health notifying him that his registration has been accepted, and to advise of next steps.  Please spend a LOT of time reviewing information for patients on this web site.  It will explain the precautions, locations of dispensaries, etc.  If you investigate the links to particular dispensaries, you will find general information on costs.    Only time and experimentation will tell if and how much this helps Raphael's appetite; the pharmacists who work at the dispensary can also give you information on what they've learned from other patients treated for the same symptom.     11/28: Don't feel like you have to get this.  It's expensive.  I believe one of my patients told me they were told it can take 6 weeks or so before it will start  "working.  Use the Marinol/dronabinol instead.  Furthermore, it looks like Mirtazapine was also sent to [facility], and it might help Raphael's appetite.      Marinol    This is prescription marijuana-like medicine for appetite that appears to be covered by your insurance.  See \"Appetite\" below.     Ritalin = Methylphenidate    This med helps keep you awake.  Take it first thing in the morning and again at 2pm or before.  Don't take it after 2 pm as it can keep you awake at night.    Constipation    To prevent constipation, take 1 cap of Miralax in fluid daily PLUS 2 Senna tabs at bedtime.      Stop the docusate; there is no proof that it works.    TUMS can also be constipating and may not work as well as other meds for heartburn. Your hospice RN can guide you if you want to try something instead of TUMS.    If the above doses of Miralax and Senna don't result in a BM at least every other day, you may increase Miralax to twice daily and Senna up to 4 tabs twice daily.  You can adjust these on your own.  Aim for a standard dose that you take every single day.    If you need an extra boost, Raphael may take 2-3 Bisacodyl 5 mg tabs daily; they usually work within about 4 hours.  Take the Bisacodyl ONLY as NEEDED, not daily.    Nausea    I sent in two different prescriptions for anti-nausea meds:  Ondansetron and Prochlorperazine.  They work in different ways and sometimes people need to take both.  While Raphael was here, he was given Ondansetron.  If that worked here, it should work at home, so start with that drug.  If it doesn't relieve nausea within 1 hour, take the other drug.      Keep track (record the date, time, drug and dose) of each anti-nausea med that Raphael takes on a day by day basis.      Appetite    Dronabinol = Marinol, two different names for the same medicine.  Raphael will be discharged on the 5 mg dose.  Take it twice daily before meals.  It can cause euphoria.  If insurance paid for it, you may try increasing " "the dose to 3x/day before meals.  Cut back to twice a day if (1) Raphael has mental status changes or (2) if it doesn't work.  This is a controlled drug and requires a \"hard copy\" prescription with a provider's original signature.  This is synthetic marijuana/THC.      Pain    Poorly controlled pain can deplete one's energy.  I hope Raphael's constipation is sufficiently controlled that he'll be willing to take Norco for pain when he needs it. Also keep track of each dose of Norco taken--date, time, dose.  Hospice can use this information to determine if or when he might benefit from a long-acting opioid. If he gets to taking 6 tabs over a 24-hour period for a couple of consecutive days, it means he would probably benefit from a long-acting opioid.     MISC:    Dexamethasone dose is now 8mg (two 4mg tabs) twice daily.  Take it with breakfast and supper.    The Pantoprazole/Protonix should be taken daily on an empty stomach first thing in the morning.  It turns off the stomach's acid pump, which is put into hyperdrive by the Dexamethasone.  Some people still get stomach aches though--from the Dexamethasone.  So just keep your hospice RN informed.    Metformin dose, here, was reduced to just one 500 mg tab twice a day.  I believe your baseline dose was 1000 mg twice daily.  If you don't mind checking your blood sugar a few times, you and your hospice RN can probably figure out which dose you're going to need.  If you don't want to check your blood sugars, stick with the 500 mg dose twice daily as it's safer to run a HIGH blood sugar than a LOW blood sugar in a circumstance like this.     I stopped the Lipitor.  Raphael's not eating enough to have ANY problems with cholesterol right now.  I'm sure you all wish he were!    There's one prescription I did not write for which Raphael was given once or twice the whole time he was here--Lorazepam.  It's a tranquilizer like Buspar.  Until hospice delivers that medicine, if he feels " anxious, just give him an extra dose of Buspar.  I'm pretty sure hospice will be delivering some Lorazepam to the home.     Please call me if you have other questions.      Best wishes to you all!       Discharge Medications   Current Discharge Medication List      START taking these medications    Details   bisacodyl (DULCOLAX) 5 MG EC tablet Take 2 tablets (10 mg) by mouth daily as needed (constipation not controlled by Senna and Miralax)  Qty: 60 tablet, Refills: 3    Associated Diagnoses: Slow transit constipation      dronabinol (MARINOL) 5 MG capsule Take 1 capsule (5 mg) by mouth 2 times daily (before meals) May increase to 3x/day before meals if tolerated  Qty: 90 capsule, Refills: 1    Associated Diagnoses: Nausea and vomiting, intractability of vomiting not specified, unspecified vomiting type; Anorexia      melatonin 1 MG TABS tablet Take 1 tablet (1 mg) by mouth nightly as needed for sleep  Qty: 60 tablet, Refills: 1    Associated Diagnoses: Primary clear cell carcinoma of kidney, left (H)      methylphenidate (RITALIN) 5 MG tablet Take 1 tablet (5 mg) by mouth 2 times daily  Qty: 10 tablet, Refills: 0    Associated Diagnoses: Primary clear cell carcinoma of kidney, left (H); Neoplastic (malignant) related fatigue      mirtazapine (REMERON) 15 MG tablet Take 1 tablet (15 mg) by mouth At Bedtime  Qty: 30 tablet, Refills: 3    Associated Diagnoses: Anorexia; Primary clear cell carcinoma of kidney, left (H)      ondansetron (ZOFRAN-ODT) 4 MG ODT tab Take 1 tablet (4 mg) by mouth every 6 hours as needed for nausea or vomiting  Qty: 120 tablet, Refills: 3    Associated Diagnoses: Primary clear cell carcinoma of kidney, left (H); Nausea and vomiting, intractability of vomiting not specified, unspecified vomiting type      prochlorperazine (COMPAZINE) 10 MG tablet Take 1 tablet (10 mg) by mouth every 6 hours as needed (nausea/vomiting not controlled by Ondansetron (OK to take both))  Qty: 90 tablet, Refills: 3     Associated Diagnoses: Primary clear cell carcinoma of kidney, left (H); Nausea and vomiting, intractability of vomiting not specified, unspecified vomiting type      senna-docusate (SENOKOT-S;PERICOLACE) 8.6-50 MG per tablet Take 2 tablets by mouth At Bedtime May increase to 2 tabs twice daily if necessary.  Qty: 100 tablet, Refills: 3    Associated Diagnoses: Slow transit constipation         CONTINUE these medications which have CHANGED    Details   dexamethasone (DECADRON) 4 MG tablet Take 2 tablets (8 mg) by mouth 2 times daily (with meals) Do not stop suddenly; drug must be tapered down.  Qty: 120 tablet, Refills: 1    Associated Diagnoses: Brain metastasis (H); Primary clear cell carcinoma of kidney, left (H)      polyethylene glycol (MIRALAX/GLYCOLAX) powder Take 17 g by mouth daily May increase dose to twice daily if necessary.  Qty: 500 g, Refills: 3    Associated Diagnoses: Slow transit constipation         CONTINUE these medications which have NOT CHANGED    Details   albuterol (PROAIR HFA/PROVENTIL HFA/VENTOLIN HFA) 108 (90 Base) MCG/ACT inhaler Inhale 2 puffs into the lungs every 4 hours as needed for shortness of breath / dyspnea or wheezing      benzonatate (TESSALON) 200 MG capsule Take 200 mg by mouth 3 times daily as needed for cough       busPIRone (BUSPAR) 10 MG tablet Take 10 mg by mouth 2 times daily      HYDROcodone-acetaminophen (NORCO) 5-325 MG per tablet Take 1-2 tablets by mouth every 4 hours as needed for moderate to severe pain       levETIRAcetam (KEPPRA) 500 MG tablet Take 500 mg by mouth 2 times daily      lidocaine-prilocaine (EMLA) cream Apply topically as needed for moderate pain Apply quarter size amount to port site 30 to 60 minutes prior to access.      metFORMIN (GLUCOPHAGE-XR) 500 MG 24 hr tablet Take 1,000 mg by mouth 2 times daily (before meals)      pantoprazole (PROTONIX) 40 MG EC tablet Take 40 mg by mouth daily      order for DME Equipment being ordered: [facility]  Bed  Requesting hospital bed for living room on main floor. 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 Dizziness, fall risk, fatigue/malaise, difficulty navigating stairs due to brain mets. Also CERON/SOB affects mobility around home especially up and down stairs.  Qty: 1 each, Refills: 0    Associated Diagnoses: Brain metastasis (H); Metastasis to lung (H)         STOP taking these medications       atorvastatin (LIPITOR) 10 MG tablet Comments:   Reason for Stopping:         Cabozantinib S-Malate (CABOMETYX) 60 MG Comments:   Reason for Stopping:         docusate sodium (COLACE) 100 MG capsule Comments:   Reason for Stopping:             Allergies   No Known Allergies    Consultations This Hospital Stay   Consultation during this admission received from:    SOCIAL WORK IP CONSULT  OCCUPATIONAL THERAPY ADULT IP CONSULT  PHYSICAL THERAPY ADULT IP CONSULT  PALLIATIVE CARE ADULT IP CONSULT  NUTRITION SERVICES ADULT IP CONSULT    Significant Results and Procedures   Procedures    None.    Data   Results for orders placed or performed during the hospital encounter of 11/23/18   CT Chest/Abdomen/Pelvis w Contrast    Narrative    CT CHEST, ABDOMEN, AND PELVIS WITH CONTRAST 11/23/2018 10:24 PM     HISTORY: Abdominal pain, chronic cough, known renal cell carcinoma  with mets to lung and brain.    COMPARISON: CT chest from August 27, 2018, CT chest, abdomen, pelvis  from August 8, 2018    TECHNIQUE: Volumetric helical acquisition of CT images from the lung  apices through the symphysis pubis after the administration of 100 mL  Isovue -370 intravenous contrast. Radiation dose for this scan was  reduced using automated exposure control, adjustment of the mA and/or  kV according to patient size, or iterative reconstruction technique.    FINDINGS:     Chest: Multiple bilateral pulmonary masses noted. No pleural or  pericardial effusion. Extensive mediastinal and bilateral  hilar  adenopathy. All of these findings are significantly progressed since  the comparison study. Normal caliber aorta. Normal heart size.    Abdomen and pelvis: Mass in the left renal fossa measures 9.1 x 8.3 cm  today. Previously this measured 5.4 x 5.0 cm. Decreased retrograde  peritoneal adenopathy since the comparison study. Small amount of  pelvic free fluid. No bowel obstruction. No hydronephrosis. Liver,  gallbladder, spleen, and pancreas demonstrate no worrisome focal  lesion. Multiple nodules in the right adrenal gland are increased in  size compatible with worsening metastatic disease.     Survey of the visualized bony structures demonstrates no destructive  bony lesions.      Impression    IMPRESSION:  1. Increase in size and number of pulmonary masses and adenopathy  compatible with worsening metastatic disease in the chest.  2. Enlarging left renal fossa mass and retroperitoneal adenopathy  compatible with worsening disease.  3. Otherwise no acute process demonstrated in the chest, abdomen, and  pelvis.    ROMA SOLIMAN MD   CT Head w/o Contrast    Narrative    CT SCAN OF THE HEAD WITHOUT CONTRAST   11/25/2018 5:20 PM     HISTORY: Rule out ICH.    TECHNIQUE:  Axial images of the head and coronal reformations without  IV contrast material.  Radiation dose for this scan was reduced using  automated exposure control, adjustment of the mA and/or kV according  to patient size, or iterative reconstruction technique.    COMPARISON: 10/17/2018    FINDINGS: During the interval, there has been a right frontal  craniotomy for tumor resection. There is some volume loss at the site  of tumor resection. There are also bilateral areas of low density  consistent with vasogenic edema in both hemispheres with other nodular  lesion seen in the left hemisphere measuring approximately 1.2 and 0.9  cm in size. The amounts of vasogenic edema have significantly  progressed since the prior study. Ventricles are not enlarged.  There  is no evidence for intracranial hemorrhage or acute infarct.      Impression    IMPRESSION:  1. Interval right frontal craniotomy for tumor resection.  2. Increasing areas of vasogenic edema in both hemispheres with  associated underlying lesions. The pattern suggests progressive  metastatic disease. Clinical correlation suggested.  3. No evidence for intracranial hemorrhage or midline shift.     JOSIE SKELTON MD   Chest 2 Views*    Narrative    CHEST 2 VIEWS   11/26/2018 1:13 AM     HISTORY: New hypoxia.    COMPARISON: 11/23/2018 - CT chest, abdomen and pelvis.    FINDINGS: Moderate-sized mass-like opacities in bilateral lung bases  in addition to a few smaller patchy nodular opacities scattered within  the upper and mid lungs bilaterally, not convincingly changed since  the comparison CT scan dated 11/23/2018, allowing for differences in  technique. Normal-sized cardiac silhouette. Right anterior chest wall  port with catheter entering the right internal jugular vein and distal  tip in the right atrium.      Impression    IMPRESSION:   1. No convincing interval change since 10/23/2018.  2. A few opacities scattered within both lungs, including dominant  moderate-sized mass-like opacities in bilateral lung bases.    JAARD NICHOLE MD   CT Chest pulmonary embolism w contrast    Narrative    CT CHEST WITH CONTRAST   11/26/2018 2:41 AM     HISTORY: New hypoxia.    COMPARISON: 11/23/2018 - CT chest, abdomen and pelvis.    TECHNIQUE: Following the uneventful administration of 80 mL Isovue-370  intravenous contrast, helical sections were acquired through the lungs  according to the pulmonary embolism protocol. Coronal reconstructions  were generated. Radiation dose for this scan was reduced using  automated exposure control, adjustment of the mA and/or kV according  to the patient's size, or iterative reconstruction technique.    FINDINGS: No visualized pulmonary embolus. The thoracic aorta is  normal in caliber  without dissection. Right anterior chest wall port  with catheter entering the right internal jugular vein and distal tip  in the right atrium.    Several mass-like opacities scattered within both lungs, including a  dominant 11 cm opacity in the right lung base, not convincingly  changed since 11/23/2018. Mild groundglass opacities scattered with in  the central aspects of both lungs, new. Tiny left pleural effusion  again noted. No right pleural or pericardial effusion. Several mildly  and moderately enlarged mediastinal and right hilar lymph nodes,  unchanged. For example, there is a 2.9 x 2.8 cm right hilar lymph node  (series 4,  image 69). Patchy lucencies within bilateral humeral heads  again noted.    Scan through the upper abdomen is significant for a complex 11 x 8 cm  fluid collection abutting the posterior wall of the stomach (series 4,  image 134) that has moderately increased in size. Again noted are  moderate diffuse fatty infiltration of the liver, moderate nodularity  of the right adrenal gland, prior left nephrectomy, a 9 cm  heterogeneous mass centered in the left renal fossa and mild  splenomegaly.      Impression    IMPRESSION:   1. Mild groundglass opacities in the central aspects of both lungs  that could represent atelectasis or pulmonary edema.  2. No visualized pulmonary embolus.  3. Extensive metastatic neoplasm in the chest including several  bilateral pulmonary masses, several enlarged thoracic lymph nodes, a  probable right adrenal metastasis, probable bilateral humeral  metastasis, and a large metastasis in the left renal fossa, not  convincingly changed since the recent study dated 11/23/2018.  4. Nonspecific moderate-sized irregular-shaped 11 x 8 cm fluid  collection in the upper left hemiabdomen and abutting the posterior  wall of the stomach, moderately increased in size.    JARAD NICHOLE MD       History of Present Illness   (From H&P) Raphael Marin is a 38 year old male with the  "above past medical history now presents on 11/23/2018 with abdominal pain, poor oral intake, generalized weakness, and safety concerns.      Patient was diagnosed with renal cell carcinoma in May 2018. He follows with Dr. Rodriguez through [email or web address] system. He has left nephrectomy in June 2018. He developed headaches and was found to have mets to brain and lungs. On 11/1/18 he underwent right craniotomy for tumor resection. He is currently receiving whole brain radiation through Dr. Quijano at [facility] as it is closer to home for him. He has completed 6 of 10 rounds of radiation thus far, last radiation course was today 11/23/2018.     Since starting his radiation he has become progressively weaker, has lost his appetite, and has not been doing well at home. He denies any falls, but feels so weak that he has nearly fallen on a few occasions. He denies dizziness or syncope. Since his tumor resection he has some right arm weakness, but otherwise no focal weakness.     His abdominal problems started about a week ago. He often feels nauseated and \"gaggy,\" and on multiple occasions he has vomited his food, drink, or medications. No hematemesis. He has not been eating or drinking much. He develops post prandial fullness after only a few bites of food. He has difficulty with bowel movements, although his last bowel movement was this morning. He denies melena or hematochezia. He feels very bloated and at times constipated.      He has a chronic cough for the past few months with shortness of breath. He was told this is possibly related to his lung mets. He has tried some cough suppressants without much relief. No wheezing, fevers, chills.     Patient lives with his parents. Starting in 2 days, they will both be gone during the day at work. They are concerned about patient's safety being home alone all day with his current state of health.      The remainder review of systems is negative.    Hospital Course   Raphael Marin " was admitted on 11/23/2018.  The following problems were addressed during his hospitalization:    Declining functional status in patient with metastatic cancer  Overall not doing well since starting whole brain radiation. Poor oral intake, dehydrated, generally weak. Family is concerned about his safety at home while they are gone at work. Patient was a direct admit request from Dr. Quijano (radiation oncologist) for further evaluation.  Remains deconditioned and weak, remains in ICU due to hypoxemia.       LISSETTE De La Paz CNP met with patient and his mom 11/27/18 morning to review palliative care issues and to discuss plans for future care and end-of-life planning.  I met with patient and his mom the same evening to discuss the same.  Patient's primary goal is to get home; he will be going to live with his mother as he cannot live independently as ill as he is.  He is currently Full Code status by his own decision, but is now undecided about that.  There is a plan in place for a team from [facility] to meet with him and his mom here in-house; that meeting occurred at 1000 today, 11/28/18.  Raphael has decided to enroll into Hospice.  Discharge to Hospice is planned for 1100 to[JM1.1]day.[JM1.2]  - Discharge to [facility] at 1100 to[JM1.1]day.[JM1.2]  - Continue high calorie/high protein supplements recommended in addition to general diet per RD recommendations, which are ordered.          Abdominal pain, generalized  Poor oral intake  Postprandial fullness  On admission reported inconsistent bowel movements, nausea, and vomiting. Develops postprandial fullness after just a few bites of food. Afebrile, minimal leukocytosis (WBC 11.1) upon admission.  CT abdomen 11/23/18 showed significant increased in tumor burden in the lungs, abd lymph nodes, left kidney bed and left adrenal gland, but no evidence of bowel obstruction.  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  Currently  reports no abdominal pain, no nausea or recent emesis, abdominal exam unremarkable.  Able to take in some food, and Boost.  - Some improvement in abdominal symptoms with dronabinol; dose increased 11/27 to 5 mg BID[JM1.1], with instructions that dose can be further increased to 5 mg TID if needed for nausea/vomiting or poor appetite.  - [facility][JM1.2][facility] has qualified him for medical marijuana upon discharge[JM1.1] as well.[JM1.2]          Hypercalcemia   Etiology not entirely clear, but has lucencies in the bilateral humeral heads on chest CT suggestive of bony mets.  May also be due to a PTHrH from neoplasm; labs pending.  Ca 12.5 on admission, treated with IVF, improved to 10.9 on 11/25/18.  So IVF stopped.  Ca then 11.1-->11.7 this morning after furosemide 40 mg x3 doses yesterday.  Ca ionized 6.6 this morning.  Given continued rise in Ca despite IVF and loop diuretics, I gave him zoledronic acid 4 mg x1 on 11/26/18.  Ca ionized slightly better at 6.2 as of 11/27/18.  - PTH low, which is a non-specific finding, with PTHrH pending.  - Next zoledronic acid could be given on or after 12/3/18, if needed.  - I did not order repeat labs given transition to Hospice.      Brain mets with generalized edema  -CT head done 11/25/18:  1. Interval right frontal craniotomy for tumor resection.  2. Increasing areas of vasogenic edema in both hemispheres with  associated underlying lesions. The pattern suggests progressive  metastatic disease. Clinical correlation suggested.  3. No evidence for intracranial hemorrhage or midline shift.   - Suspect edema described above is due to XRT, but can discuss with Dr Quijano, Radiation Oncology, to get his opinion.  - Remains on dexamethasone 8 mg BID[JM1.1], which in my opinion should continue, both to help mitigate mass effect from mets, and also to void possible discomfort from steroid withdrawal.[JM1.2]      Generalized weakness, risk for falls  Will be alone all day starting this  week while family is at work. Family concerned about patient's safety with his weakness.  Brain mets likely affecting patient's balance and overall function.   - PT/OT evaluations can stop given transition to Hospice.          Cough   Chronic cough. Known lung mets. Chest CT on 11/26/18 confirms the present of extensive metastatic disease to both lungs in the form of large bilateral masses.  Cough reported to be mild and non-productive this morning - low suspicion for pneumonia. Cough managed prior to admission with Tessalon and albuterol without much relief.[JM1.1]  Cough has improved over last two days, but persists.[JM1.2]  - Continue benzonatate and albuterol PRN.          Tachycardia   HR 93 - 123 last 24 hours.  Tachycardia has been mild and persistent since admission.  Cause unclear.  No evidence of PE on chest CT 11/26/18.   - Observe HR.          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 S/P left nephrectomy in June 2018, S/P brain tumor resection on 11/1/18, all through [facility]. Follows with oncology Dr. Rodriguez through [facility], but is doing radiation at [facility], which is closer to home. On day of admission (11/23/2018) he received round 6 of 10 of whole brain radiation. Has been prescribed carozantinib for RCC but has not yet started taking it. Receives decadron 4 mg q 6 hours prior to admission for radiation induced inflammation. On Keppra prior to admission.  No seizure activity seen here since admission.  - Hold prior to admission carozantinib  - Continue prior to admission dexamethasone, though dose changed from 4 mg Q6H to 8 mg Q12H.  - Continue prior to admission Keppra.          Type 2 diabetes mellitus treated without insulin  Hgb A1c 6.7 on admission.  Managed prior to admission with metformin 1000 mg BID, though is not receiving metformin here in-house; can't find documentation as to why as GFR has been OK.  Resumed metformin at lower  than preadmission dose at 500 mg BID on 11/26/18.  Glucose remains consistently high, 256 to 327 last 5 checks.  - Will continue metformin 500 mg BID without dose increase despite persistent hyperglycemia, as I am concerned that patient's PO intake is likely to decline in near future, which could cause hypoglycemia.[JM1.1]  Metformin can be discontinued at any time after discharge should patient's appetite wane.[JM1.2]          Essential hypertension with goal blood pressure less than 130/80  Not on antihypertensive medications prior to admission.  BP OK here on no Rx.  -[JM1.1] Discharging on no antihypertensive[JM1.2] medications.          Anxiety disorder due to general medical condition  Stable mood upon admission. Managed prior to admission with Buspar, continue.     Hypoxemic respiratory failure, acute  O2 needs increased over night 11/25-26, transferred to ICU.  BiPAP used for about an hour, patient had difficulties tolerating it so it has been removed.  Oxygenation has gradually improved; currently on O2 per 5 L cannula, SpO2 91-94%.  Denies dyspnea or respiratory chest pain.  CT chest 11/26/18 showed no PE, extensive bilateral masses unchanged, mild perihilar airspace opacities new from prior study possibly representing pulmonary edema.  I haven't wanted to give diuretics given persistent tachycardia.  - Continue O2 per cannula, current flow of 5 L/min is adequate.  When patient is discharged to Hospice, though I think we should accept lower SpO2 as long as he is asymptomatic, even in the 80's.[JM1.1]  Home O2 order written.[JM1.2]    Pending Results   Unresulted Labs Ordered in the Past 30 Days of this Admission     Date and Time Order Name Status Description    11/26/2018 0000 PTH Related Peptide Test In process           Physical Exam   Temp:  [97.8  F (36.6  C)-98.5  F (36.9  C)] 98.5  F (36.9  C)  Heart Rate:  [] 97  Resp:  [18-26] 24  BP: (111-123)/(69-75) 121/69  SpO2:  [92 %-94 %] 93  %  Vitals:    11/25/18 0607 11/26/18 0300 11/28/18 0623   Weight: 75.5 kg (166 lb 7.2 oz) 98 kg (216 lb 0.8 oz) 96 kg (211 lb 10.3 oz)[JM1.1]       GENERAL: Pleasant man, seated in recliner, looks fatigued, but in no distress.  EYES: Eyes grossly normal to inspection, extraocular movements intact  HENT: Nares patent bilaterally, no discharge.  NECK: Trachea midline, no stridor.    RESP: No accessory muscle use.  Symmetrical breath sounds.  Scattered areas of mild crackles on inspiration over both sides, loudest left anterior.  Expiration not prolonged, no wheeze.  CV: Regular rate and rhythm, mildly tachycardic.  Normal S1 S2, no murmur or extra sound.  No lower extremity edema.  ABDOMEN: Soft, non-tender, no guarding.  Liver and spleen not palpable, no masses palpable.  Bowel sounds positive.  MS: No bony deformities noted.  No red or inflamed joints.  SKIN: Warm and dry, no rashes.  NEURO: Appears mildly somnolent, but is oriented, conversant.  Cranial nerves II - XII grossly intact.  No gross motor or sensory deficits.    PSYCH: Calm, mildly somnolent, conversant.  Able to articulate logical thoughts, no tangential thoughts, no hallucinations or delusions.[JM1.2]      The discharge plan was discussed with the patient and[JM1.1] his mom, both of whom express agreement.[JM1.2]    Total time on this discharge was[JM1.1] 35 minutes[JM1.2].    Surinder Rivera[JM1.1] MD[JM1.2]                 Revision History        User Key Date/Time User Provider Type Action    > JM1.2 11/29/2018  9:42 AM Surinder Rivera MD Physician Sign     JM1.1 11/29/2018  9:22 AM Surinder Rivera MD Physician                      Consult Notes      Consults by Nasrin Thurman RD at 11/28/2018 12:46 PM     Author:  Nasrin Thurman RD Service:  Nutrition Author Type:  Registered Dietitian    Filed:  11/28/2018 12:52 PM Date of Service:  11/28/2018 12:46 PM Creation Time:  11/28/2018 12:46 PM    Status:  Signed :  Cuca  "ANUPAM Alexandra (Registered Dietitian)     Consult Orders:    1. Nutrition Services Adult IP Consult [[id number]] ordered by Surinder Rivera MD at 11/27/18 9475                Nutrition Services Brief Note    Received RN consult for \"patient not eating well, does like boost, how many boost can he have per day?\" RD already following pt; last seen on 11/25.  Per chart review pt scheduled to have a hospice meeting today at 10am. Called RN to discuss how meeting went and she stated pt will be here until Thursday 11/29. Discussed possibility of sending 6 Boost/day to pt and RN agreeable. To address consult, writer will place order for Boost TID with meals and between meals (variable flavors).    RD will continue to monitor pt.    Nasrin Thurman RDN, [facility]: [phone]  [facility]: [phone][KK1.1]       Revision History        User Key Date/Time User Provider Type Action    > KK1.1 11/28/2018 12:52 PM Nasrin Thurman RD Registered Dietitian Sign            Consults by Belgica Guerrero APRN CNP at 11/25/2018  1:32 PM     Author:  Belgica Guerrero APRN CNP Service:  Palliative Author Type:  Nurse Practitioner    Filed:  11/25/2018  1:32 PM Date of Service:  11/25/2018  1:32 PM Creation Time:  11/25/2018 12:15 PM    Status:  Signed :  Belgica Guerrero APRN CNP (Nurse Practitioner)     Consult Orders:    1. Palliative Care Adult IP Consult: anorexia; Consultant may enter orders: Yes; Patient to be seen: Routine - within 24 hours [[id number]] ordered by Belgica Guerrero APRN CNP at 11/25/18 1148                [facility]    Palliative Care Consultation--Inpatient  Admission Date: 11/23/2018   Visit Date: November 25, 2018  PCP: Lul Guerrero   Requested by: Lloyd Canales MD      HISTORY of PRESENT ILLNESS:  Raphael Marin is a 38 year old year old male admitted with renal cell carcinoma discovered in May of this year; it was metastatic to lymph and lung at the time of diagnosis, and later " metastasized to the brain.  He presented here on 11/23 with weakness, falls, anorexia, failure to thrive.  He is currently undergoing brain radiation with 4 additional treatments pending, and with plans to initiate Cabozantinib, a TKI, upon completion of radiation.  He was referred to Palliative Care for certification for medical cannabis .      ASSESSMENT & PLAN:    # Metastatic renal cell carcinoma (lung, brain, lymph)  # Anorexia, weakness, weight loss; mother reports appetite yesterday improved after start of Marinol 2.5 mg and Remeron at HS  > certified Raphael as eligible for medical cannabis    # Constipation  > upon discharge, replace colace with Senna, and continue daily Miralax.  PRN Bisacodyl.     # Recurrent Nausea; has no antiemetic Rx's at home.  Was given 2 doses Zofran yesterday, and one today thus far.   > see Discharge instructions    # Advance Care Planning:  > Code status:  Full Code  > Health Care Directive: NO  > POLST:  No    # Goals/Dispo:  Wants to go home so he can resume radiation and start oral chemo upon completion of radiation, as previously planned      Thank you for the opportunity to be of service to this patient and family.      Shane Guerrero (Ann), CNP  Palliative Care  Private cell:  [phone]     Face to face:   1040 - 1130, 50 min, > 50% spent discussing  appropriate use of medications for symptom management.   Non face to face:   [phone] and 1130 - 1330, 130 min, > 50% spent reviewing and writing prescriptions in anticipation of discharge, coordinating care with  attending, nursing and Care Transitions and writing detailed discharge instructions.       Discharge Instructions:  From Shane Guerrero, Palliative Care NP, Cell [phone]     Medical Cannabis    Here is the main web site for information on medical cannabis:  [email or web address]     The web site notes that it may take UP TO 30 DAYS for Raphael to hear from the [place]  "Department of Health notifying him that his registration has been accepted, and to advise of next steps.  Please spend a LOT of time reviewing information for patients on this web site.  It will explain the precautions, locations of dispensaries, etc.  If you investigate the links to particular dispensaries, you will find general information on costs.    Only time and experimentation will tell if and how much this helps Raphael's appetite; the pharmacists who work at the dispensary can also give you information on what they've learned from other patients treated for the same symptom.     Constipation    To prevent constipation, take 1 cap of Miralax in fluid daily PLUS 2 Senna tabs at bedtime.      Stop the docusate; there is no proof that it works.    If the above doses of Miralax and Senna don't result in a BM at least every other day, you may increase Miralax to twice daily and Senna up to 4 tabs twice daily.  You can adjust these on your own.  Aim for a standard dose that you take every single day.    If you need an extra boost, Raphael may take 2-3 Bisacodyl 5 mg tabs daily; they usually work within about 4 hours.  Take the Bisacodyl ONLY as NEEDED, not daily.    Nausea    I sent in two different prescriptions for anti-nausea meds:  Ondansetron and Prochlorperazine.  They work in different ways and sometimes people need to take both.  While Raphael was here, he was given Ondansetron.  If that worked here, it should work at home, so start with that drug.  If it doesn't relieve nausea within 1 hour, take the other drug.      Keep track (record the date, time, drug and dose) of each anti-nausea med that Raphael takes on a day by day basis.      Appetite    Dronabinol = Marinol, two different names for the same medicine.  Raphael will be discharged on the 2.5 mg dose.  The dose CAN be increased.  This is a controlled drug and requires a \"hard copy\" prescription with a provider's original signature.  This is synthetic " marijuana/THC.      Pain    Poorly controlled pain can deplete one's energy.  I hope Raphael's constipation is sufficiently controlled that he'll be willing to take Norco for pain when he needs it. Also keep track of each dose of Norco taken--date, time, dose.      Please call me if you have other questions.      I've told the Discharge Planner that you would like Raphael to receive home-based palliative care, including physical therapy.  She will ask for [facility] to deliver this service.      Please remember to go to the [facility] pharmacy before you leave to  the prescription for Marinol/Dronabinol.  All the other prescriptions were sent to [facility] in [place].     Best wishes to you all!    = = = = = = = = = = = = = = = =      PROBLEM LIST[AT1.1]  Patient Active Problem List   Diagnosis     Type 2 diabetes mellitus treated without insulin (H)     Primary clear cell carcinoma of kidney, left (H)     Essential hypertension with goal blood pressure less than 130/80     Anxiety disorder due to general medical condition     Failure to thrive in adult     Abdominal pain, generalized     Medical cannabis--Certified eligible on 11/25/18[AT1.2]       PAST MEDICAL HISTORY[AT1.1]  Past Medical History:   Diagnosis Date     Brain metastases (H)     resection 11/1/18 [facility]     Renal cell carcinoma (H)     s/p left nephrectomy[AT1.2]       PAST SURGICAL HISTORY[AT1.1]  Past Surgical History:   Procedure Laterality Date     BRAIN TUMOR RESECTION Right 11/01/2018     NEPHRECTOMY Left 06/2018[AT1.2]       FAMILY MEDICAL HISTORY[AT1.1]  Family History   Problem Relation Age of Onset     Retinal detachment Mother      Cataracts Mother      Pancreatic Cancer Father      Diabetes Paternal Grandmother[AT1.2]        SOCIAL HISTORY[AT1.1]  History   Smoking Status     Former Smoker     Types: Cigarettes     Quit date: 2012   Smokeless Tobacco     Never Used       Alcohol use No     History   Drug  Use No     Social History     Social History Narrative    2018:  History provided by mother.  Never , has a teenage daughter who does NOT live with patient.  Patient is now living with his mother and stepfather.  Patient's biological father has .  Former smoker.  Has medical assistance.     Shane Guerrero CNP (Ann)    Palliative Care    Private cell:  [phone][AT1.2]        SPIRITUAL HISTORY  deferred    ALLERGIES[AT1.1]  No Known Allergies[AT1.2]    MEDICATIONS  Medications Prior to Admission[AT1.1]    No current facility-administered medications on file prior to encounter.   Current Outpatient Prescriptions on File Prior to Encounter:  atorvastatin (LIPITOR) 10 MG tablet Take 10 mg by mouth daily   benzonatate (TESSALON) 200 MG capsule Take 200 mg by mouth 3 times daily as needed for cough    busPIRone (BUSPAR) 10 MG tablet Take 10 mg by mouth 2 times daily   HYDROcodone-acetaminophen (NORCO) 5-325 MG per tablet Take 1-2 tablets by mouth every 4 hours as needed for moderate to severe pain    levETIRAcetam (KEPPRA) 500 MG tablet Take 500 mg by mouth 2 times daily   metFORMIN (GLUCOPHAGE-XR) 500 MG 24 hr tablet Take 1,000 mg by mouth 2 times daily (before meals)   pantoprazole (PROTONIX) 40 MG EC tablet Take 40 mg by mouth daily   order for DME Equipment being ordered: Hospital BedRequesting hospital bed for living room on main floor. 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 Dizziness, fall risk, fatigue/malaise, difficulty navigating stairs due to brain mets. Also CERON/SOB affects mobility around home especially up and down stairs.   [DISCONTINUED] dexamethasone (DECADRON) 4 MG tablet Take 1 tablet (4 mg) by mouth every 8 hours (Patient taking differently: Take 4 mg by mouth every 6 hours )[AT1.2]       Current Medications[AT1.1]    atorvastatin  10 mg Oral At Bedtime     busPIRone  10 mg Oral BID     dexamethasone  8 mg Oral Q12H MONICA      dronabinol  2.5 mg Oral BID     heparin  5 mL Intracatheter Q28 Days     heparin lock flush  5-10 mL Intracatheter Q24H     insulin aspart  1-10 Units Subcutaneous TID AC     insulin aspart  1-7 Units Subcutaneous At Bedtime     levETIRAcetam  500 mg Oral BID     mirtazapine  15 mg Oral At Bedtime     pantoprazole  40 mg Oral QAM AC     senna-docusate  1 tablet Oral BID    Or     senna-docusate  2 tablet Oral BID[AT1.2]       PRN Medications[AT1.1]  acetaminophen, acetaminophen, albuterol, benzonatate, bisacodyl **OR** bisacodyl **OR** bisacodyl, glucose **OR** dextrose **OR** glucagon, heparin lock flush, HYDROcodone-acetaminophen, HYDROmorphone, lidocaine 4%, lidocaine 4%, lidocaine (buffered or not buffered), magnesium citrate, melatonin, naloxone, ondansetron **OR** ondansetron, polyethylene glycol, prochlorperazine **OR** prochlorperazine **OR** prochlorperazine, sodium chloride (PF), sodium chloride (PF)[AT1.2]      REVIEW OF SYSTEMS  Unable due to somnolence.  Mother reports last BM was 2d ago with a recent h/o constipation, so bothersome that he has avoided the use of Norco even when his abdominal pain has been quite severe.  Has no anti-emetic Rx's at home.  Yesterday, after the start of Marinol, he ate well for a change.  Significant weight loss as noted below.  Recent h/o falls at home; his mother and step father moved his bed to the first floor recently, as their only bathroom is on the first floor.        PHYSICAL EXAMINATION[AT1.1]  Patient Vitals for the past 24 hrs:   BP Temp Temp src Pulse Resp SpO2 Weight   11/25/18 1100 165/70 97.9  F (36.6  C) Oral 89 18 92 % -   11/25/18 0740 160/80 97.7  F (36.5  C) Oral 93 18 93 % -   11/25/18 0607 - - - - - - 166 lb 7.2 oz (75.5 kg)   11/25/18 0302 135/78 97.8  F (36.6  C) Oral 93 18 90 % -   11/24/18 2319 142/72 99  F (37.2  C) Oral 100 18 94 % -   11/24/18 1927 123/66 99.1  F (37.3  C) Oral 104 18 93 % -   11/24/18 1604 131/76 97.9  F (36.6  C) Oral 94 20  91 % -      Wt Readings from Last 10 Encounters:   11/25/18 166 lb 7.2 oz (75.5 kg)   11/09/18 233 lb (105.7 kg)[AT1.2]   5/21/18                128.4 kg (283 lb)  3/30/15                131.5 kg (289 lb 12.8 oz)    Constitutional:  Obese, somnolent male in no apparent distress   Eyes:  anicteric  Cardiovascular:  RRR  Respiratory:  Faint crackles in bases  Genitourinary:  deferred  Musculoskeletal:  Spontaneous movement of all extremities w/o limitations  Skin:  dry  Neurological:  Nonfocal, w/o seizures/tremors  Psych:  somnolent      DATA  ROUTINE ICU LABS (Last four results)  CMP[AT1.1]    Recent Labs  Lab 11/23/18  1801      POTASSIUM 4.5   CHLORIDE 101   CO2 24   ANIONGAP 11   *   BUN 33*   CR 1.10   GFRESTIMATED 74   GFRESTBLACK >90   MED 12.5*[AT1.2]     CBC[AT1.1]    Recent Labs  Lab 11/23/18  1801   WBC 11.1*   RBC 5.34   HGB 15.2   HCT 45.8   MCV 86   MCH 28.5   MCHC 33.2   RDW 12.9   [AT1.2]     INR[AT1.1]No lab results found in last 7 days.[AT1.2]  Arterial Blood Gas[AT1.1]No lab results found in last 7 days.      Recent Results (from the past 48 hour(s))   CT Chest/Abdomen/Pelvis w Contrast    Narrative    CT CHEST, ABDOMEN, AND PELVIS WITH CONTRAST 11/23/2018 10:24 PM     HISTORY: Abdominal pain, chronic cough, known renal cell carcinoma  with mets to lung and brain.    COMPARISON: CT chest from August 27, 2018, CT chest, abdomen, pelvis  from August 8, 2018    TECHNIQUE: Volumetric helical acquisition of CT images from the lung  apices through the symphysis pubis after the administration of 100 mL  Isovue -370 intravenous contrast. Radiation dose for this scan was  reduced using automated exposure control, adjustment of the mA and/or  kV according to patient size, or iterative reconstruction technique.    FINDINGS:     Chest: Multiple bilateral pulmonary masses noted. No pleural or  pericardial effusion. Extensive mediastinal and bilateral hilar  adenopathy. All of these findings  are significantly progressed since  the comparison study. Normal caliber aorta. Normal heart size.    Abdomen and pelvis: Mass in the left renal fossa measures 9.1 x 8.3 cm  today. Previously this measured 5.4 x 5.0 cm. Decreased retrograde  peritoneal adenopathy since the comparison study. Small amount of  pelvic free fluid. No bowel obstruction. No hydronephrosis. Liver,  gallbladder, spleen, and pancreas demonstrate no worrisome focal  lesion. Multiple nodules in the right adrenal gland are increased in  size compatible with worsening metastatic disease.     Survey of the visualized bony structures demonstrates no destructive  bony lesions.      Impression    IMPRESSION:  1. Increase in size and number of pulmonary masses and adenopathy  compatible with worsening metastatic disease in the chest.  2. Enlarging left renal fossa mass and retroperitoneal adenopathy  compatible with worsening disease.  3. Otherwise no acute process demonstrated in the chest, abdomen, and  pelvis.    ROMA SOLIMAN MD[AT1.2]          Revision History        User Key Date/Time User Provider Type Action    > AT1.2 11/25/2018  1:32 PM Belgica Guerrero, LISSETTE CNP Nurse Practitioner Sign     AT1.1 11/25/2018 12:15 PM Belgica Guerrero APRN CNP Nurse Practitioner             Consults by Yudelka Huggins RN at 11/24/2018 11:03 AM     Author:  Yudelka Huggins RN Service:  (none) Author Type:      Filed:  11/24/2018 11:03 AM Date of Service:  11/24/2018 11:03 AM Creation Time:  11/24/2018 10:56 AM    Status:  Signed :  Yudelka Huggins RN ()     Consult Orders:    1. Social Work IP Consult [[id number]] ordered by Joellen Holcomb PA-C at 11/23/18 1700                Care Transition Initial Assessment - RN  Reason For Consult: other (see comments) (Home care referral, PCA with the county)   Met with: Patient.    DATA   Principal Problem:    Failure to thrive in adult  Active Problems:    Type 2 diabetes mellitus  treated without insulin (H)    Primary clear cell carcinoma of kidney, left (H)    Essential hypertension with goal blood pressure less than 130/80    Anxiety disorder due to general medical condition    Abdominal pain, generalized       Primary Care Clinic [facility]: [facility]  Primary Care MD Name: Lul Guerrero MD  Contact information and PCP information verified: Yes    ASSESSMENT  Cognitive Status: awake, alert and oriented.  Resources List: Home Care, PCA  Lives With: parent(s) (mom and step dad)  Living Arrangements: house  Description of Support System: Supportive, Involved   Who is your support system?: Parent(s)   Insurance Concerns: No Insurance issues identified, [place]    This writer met with pt, introduced self and role. Care Transitions is consulted for home care referral. This writer discussed discharge planning and Medicare guidelines in regards to home care. Pt lives with his parents in the community. Pt has no services at home and does have family support.  Patient is consulting his parents about  receiving home care in the event that these services are needed upon discharge.  Planning Ahead brochure resources provided. Transportation provided by patient family.  Patient stated that he has a [facility]  but he does not remember their name at this time, patient will talk to his mother about talking to his  if they decide to pursue the PCA services. Patient is interested in receiving PCA services from the [facility]. Pt was provided with Medicare certified home care list. Pt chooses to discuss with his MD and his family before he makes any decisions at this time. CTS to follow..     PLAN  Home with parents and home care vs PCA services with the [facility]         Yudelka Huggins RN Care Coordinator  [facility] [phone]  [facility] [phone][PW1.1]     Revision History        User Key Date/Time User Provider Type Action    > PW1.1 11/24/2018  11:03 AM Yudelka Huggins, RN Case Manager Sign                     Progress Notes - Physician (Notes from 11/26/18 through 11/29/18)      Progress Notes by Belgica Guerrero APRN CNP at 11/29/2018  8:52 AM     Author:  Belgica Guerrero APRN CNP Service:  Palliative Author Type:  Nurse Practitioner    Filed:  11/29/2018  9:06 AM Date of Service:  11/29/2018  8:52 AM Creation Time:  11/29/2018  8:52 AM    Status:  Signed :  Belgica Guerrero APRN CNP (Nurse Practitioner)         Palliative Care Follow-up Clinic Note  Date of Admission:  11/23/2018   Visit Date:  November 29, 2018        HISTORY of PRESENT ILLNESS:  Raphael Marin is a 38 year old year old male admitted with renal cell carcinoma discovered in May of this year; it was metastatic to lymph and lung at the time of diagnosis, and later metastasized to the brain.  He presented here on 11/23 with weakness, falls, anorexia, failure to thrive.  He is currently undergoing brain radiation with 4 additional treatments pending, and with plans to initiate Cabozantinib, a TKI, upon completion of radiation.  He was referred to Palliative Care for certification for medical cannabis .          ASSESSMENT & PLAN:      # Metastatic renal cell carcinoma (lung, brain, lymph)  # Anorexia, weakness, weight loss; mother reports appetite yesterday improved after start of Marinol 2.5 mg and Remeron at HS  > certified Raphael as eligible for medical cannabis  > 11/27: appetite poor, possibly r/t somnolence.  Increased Dronabinol from 2.5 to 5 mg  > 11/28: nursing indicates he is eating only ~ 25% of meals; still better than before.  Mother has protein powder at home. Patient aware that he can disregard checking blood sugars etc as he'll be on hospice.  > 11/29: still w/fatique and anorexia, though certainly improved since time of admission.  I don't plan to increase Ritalin in light of pt's tachycardia.      # Constipation  > upon discharge, replace colace with Senna, and continue  daily Miralax.  PRN Bisacodyl.   > 11/27: feels constipated.  Bisacodyl r/s today  > 11/28: bowels moved yesterday; advised Raphael that his intermittent abdominal pain might be related to constipation.      # Recurrent Nausea; has no antiemetic Rx's at home.  Was given 2 doses Zofran yesterday, and one today thus far.   > see Discharge instructions      # Somnolence possibly 2o hypercalcemia of malignancy.  Zometa given yesterday.  Doesn't meet criteria for subcutaneous miacalcin.  No IV fluids running.  > Ritalin 5 mg bid with 2nd dose of the day no later than 2pm  > 11/28: actually awake today!  Able to participate in meeting with [facility], family.  Will discharge on Ritalin 5 bid      # Advance Care Planning:  > Code status:  Full Code     11/27: discussed with patient and mother again.  He is undecided.  I told him he could think about it, but I also advised his mother to ask him if he would like her to make the decision for him, and she supports DNR/DNI.  I'll revisit this again tomorrow.  > Health Care Directive: NO  > POLST:  No; mother is the default decision make should he lose capacity as his father is dead.      # Goals/Dispo:  Wants to go home so he can resume radiation and start oral chemo upon completion of radiation, as previously planned  > 11/27: mother tells me she wants to take him home with hospice care; we can't act on this until he expresses his agreement so will review this with him tomorrow.  > 11/28: stepfather needs one more day to prepare their home for the equipment that hospice plans to deliver tomorrow morning.  I sense that he is anxious about this as well.   > 11/29: mother asking how and when to transfer records from [facility] to here; hospice did explain that he could always sign out of hospice and resume treatment if he wished. However I don't expect that.  I simply reassured mother that if he strengthens, [facility] could transfer records w/i 24 hours. Anticipate  discharge home today followed by enrollment with [facility] today.  Patient plans to stop radiation (to brain) as of now; knows he can resume it if he feels better.      Thank you for the opportunity to be of service to this patient and family.      Shane Guerrero CNP (Ann)  Palliative Care  [facility] cell:  [phone]       Face to face:   0830 - 0905, 35 min, > 50% spent coordinating care with  attending, nursing and Care Transitions and doing final review of symptoms and questions prior to imminent discharge.     ========================    SUBJECTIVE:  Sitting up in chair! Smiling! Understands he is going home today.  Expresses the wish that he HOPES for more energy and better appetite as time passes.  I communicated yesterday via [email or web address] mail to Dr Quijano/[facility] that patient had decided to stop radiation in light of his fatigue; knows he can resume it later if he improves.  Given a total of 5 Norco 5mg tabs yesterday for pain.      ROS:  As described in HPI and Subjective.  Otherwise, ALL are negative.    MEDICATIONS:[AT1.1]  No Known Allergies[AT1.2]  Scheduled meds:[AT1.1]    atorvastatin  10 mg Oral At Bedtime     busPIRone  10 mg Oral BID     dexamethasone  8 mg Oral Q12H MONICA     dronabinol  5 mg Oral BID     enoxaparin  40 mg Subcutaneous Q24H     heparin  5 mL Intracatheter Q28 Days     heparin lock flush  5-10 mL Intracatheter Q24H     insulin aspart  1-10 Units Subcutaneous TID AC     insulin aspart  1-7 Units Subcutaneous At Bedtime     levETIRAcetam  500 mg Oral BID     metFORMIN  500 mg Oral BID w/meals     methylphenidate  5 mg Oral BID     pantoprazole  40 mg Oral QAM AC     senna-docusate  1 tablet Oral BID    Or     senna-docusate  2 tablet Oral BID[AT1.2]     PRN meds:[AT1.1]  acetaminophen, acetaminophen, albuterol, benzonatate, bisacodyl **OR** bisacodyl **OR** bisacodyl, calcium carbonate, glucose **OR** dextrose **OR** glucagon, heparin lock flush, HYDROcodone-acetaminophen,  HYDROmorphone, lidocaine 4%, lidocaine 4%, lidocaine (buffered or not buffered), LORazepam, magnesium citrate, melatonin, naloxone, ondansetron **OR** ondansetron, polyethylene glycol, prochlorperazine **OR** prochlorperazine **OR** prochlorperazine, sodium chloride (PF), sodium chloride (PF)[AT1.2]      OBJECTIVE:[AT1.1]  Patient Vitals for the past 24 hrs:   BP Temp Temp src Heart Rate Resp SpO2   11/29/18 0809 121/69 98.5  F (36.9  C) Oral 97 24 93 %   11/29/18 0349 122/75 98.2  F (36.8  C) Oral 103 20 93 %   11/28/18 1929 111/71 97.8  F (36.6  C) Oral 105 26 92 %   11/28/18 1500 118/70 - - 103 25 94 %   11/28/18 1400 118/70 - - 103 25 -   11/28/18 1200 116/73 - - 108 18 94 %   11/28/18 1000 123/75 - - 111 22 94 %     Wt Readings from Last 10 Encounters:   11/28/18 211 lb 10.3 oz (96 kg)   11/09/18 233 lb (105.7 kg)[AT1.2]   Awake, sitting in chair eating some breakfast  Smiles, expresses hope for more appetite and energy  CV RRR tachy  Lungs clear, markedly diminished effort  Abd obese, normoactive bowel sounds   Ext warm, without edema     Intake/Output Summary (Last 24 hours) at 11/29/18 0853  Last data filed at 11/29/18 0400   Gross per 24 hour   Intake              240 ml   Output             1150 ml   Net             -910 ml         ROUTINE ICU LABS (Last four results)  CMP[AT1.1]  Recent Labs  Lab 11/28/18  0610 11/27/18  0620 11/26/18  0455 11/26/18  0045 11/25/18  1659    135 136 137 136   POTASSIUM 3.9 3.6 3.5 4.0 4.0   CHLORIDE 92* 92* 93* 97 98   CO2 35* 36* 34* 33* 30   ANIONGAP 6 7 9 7 8   * 234* 244* 256* 281*   BUN 28 27 25 24 28   CR 1.10 1.32* 1.19 1.15 1.08   GFRESTIMATED 74 60* 68 71 76   GFRESTBLACK >90 73 82 86 >90   MED 9.8 11.6* 11.7* 11.1* 10.9*   PROTTOTAL 5.9*  --  6.6* 6.2* 5.8*   ALBUMIN 2.3*  --  2.6* 2.5* 2.3*   BILITOTAL 0.4  --  0.4 0.4 0.2   ALKPHOS 47  --  49 47 45   AST 14  --  16 17 14   ALT 17  --  18 18 16[AT1.2]     CBC[AT1.1]  Recent Labs  Lab 11/26/18  7977  11/26/18  0045 11/23/18  1801   WBC 10.1 9.5 11.1*   RBC 5.08 4.87 5.34   HGB 14.2 13.9 15.2   HCT 43.5 41.7 45.8   MCV 86 86 86   MCH 28.0 28.5 28.5   MCHC 32.6 33.3 33.2   RDW 12.7 12.8 12.9    182 192[AT1.2]     INR[AT1.1]No lab results found in last 7 days.[AT1.2]  Arterial Blood Gas[AT1.1]No lab results found in last 7 days.    No results found for this or any previous visit (from the past 48 hour(s)).[AT1.2]       Revision History        User Key Date/Time User Provider Type Action    > AT1.2 11/29/2018  9:06 AM Belgica Guerrero APRN CNP Nurse Practitioner Sign     AT1.1 11/29/2018  8:52 AM Belgcia Guerrero APRN CNP Nurse Practitioner             Progress Notes by Ivette Tinajero at 11/28/2018  1:35 PM     Author:  Ivette Tinajero Service:  Ancillary, Case Management Author Type:      Filed:  11/28/2018  8:23 PM Date of Service:  11/28/2018  1:35 PM Creation Time:  11/28/2018  8:11 PM    Status:  Signed :  Ivette Tinajero ()         Reason for Follow up: Discharge Planning and support.    Anticipated discharge needs: Met with pt and family. Discussed discharge plan to home tomorrow at 1100. Pt plans to have [facility] services tele:[phone]/fax [phone]. In addition, talked about the benefit of having an Advance Directive and POA in place for pt. Confirmed arrangements and faxed H&P to [facility] as requested. Pt will have hospital bed, over the bed table, commode, and lift delivered to home in the morning. Everything should be in place for the pt prior to returning home.    Next steps: Non-Emergency Ambulance to be called in the morning at [phone] - transport to be arranged via stretcher with O2.[TK1.1]    Discharge Planner[TK1.2]   Discharge Plans in progress: Pt to discharge 11/29/18 at 1100 via Non-Emergency Ambulance with [facility] to follow.    Barriers to discharge plan: NONE    Follow up plan:  Non-Emergency  Ambulance to be called in the morning at [phone] - transport to be arranged via stretcher with O2. Provide support to pt/family as needed.       Entered by:[TK1.1] Ivette Tinajero 11/28/2018 8:13 PM[TK1.2]           Ivette Tinajero, [phone][TK1.1]             Revision History        User Key Date/Time User Provider Type Action    > TK1.2 11/28/2018  8:23 PM Ivette Tinajero  Sign     TK1.1 11/28/2018  8:11 PM Ivette Tinajero              Progress Notes by Belgica Guerrero APRN CNP at 11/28/2018  5:02 PM     Author:  Belgica Guerrero APRN CNP Service:  Palliative Author Type:  Nurse Practitioner    Filed:  11/28/2018  5:46 PM Date of Service:  11/28/2018  5:02 PM Creation Time:  11/28/2018  5:02 PM    Status:  Signed :  Belgica Guerrero APRN CNP (Nurse Practitioner)         Palliative Care Follow-up Clinic Note  Date of Admission:  11/23/2018   Visit Date:  November 28, 2018        HISTORY of PRESENT ILLNESS:  Raphael Marin is a 38 year old year old male admitted with renal cell carcinoma discovered in May of this year; it was metastatic to lymph and lung at the time of diagnosis, and later metastasized to the brain.  He presented here on 11/23 with weakness, falls, anorexia, failure to thrive.  He is currently undergoing brain radiation with 4 additional treatments pending, and with plans to initiate Cabozantinib, a TKI, upon completion of radiation.  He was referred to Palliative Care for certification for medical cannabis .          ASSESSMENT & PLAN:      # Metastatic renal cell carcinoma (lung, brain, lymph)  # Anorexia, weakness, weight loss; mother reports appetite yesterday improved after start of Marinol 2.5 mg and Remeron at HS  > certified Raphael as eligible for medical cannabis  > 11/27: appetite poor, possibly r/t somnolence.  Increased Dronabinol from 2.5 to 5 mg  > 11/28: nursing indicates he is eating only ~ 25% of meals; still better  than before.  Mother has protein powder at home. Patient aware that he can disregard checking blood sugars etc as he'll be on hospice.      # Constipation  > upon discharge, replace colace with Senna, and continue daily Miralax.  PRN Bisacodyl.   > 11/27: feels constipated.  Bisacodyl r/s today  > 11/28: bowels moved yesterday; advised Raphael that his intermittent abdominal pain might be related to constipation.      # Recurrent Nausea; has no antiemetic Rx's at home.  Was given 2 doses Zofran yesterday, and one today thus far.   > see Discharge instructions      # Somnolence possibly 2o hypercalcemia of malignancy.  Zometa given yesterday.  Doesn't meet criteria for subcutaneous miacalcin.  No IV fluids running.  > Ritalin 5 mg bid with 2nd dose of the day no later than 2pm  > 11/28: actually awake today!  Able to participate in meeting with [facility], family.  Will discharge on Ritalin 5 bid     # Advance Care Planning:  > Code status:  Full Code     11/27: discussed with patient and mother again.  He is undecided.  I told him he could think about it, but I also advised his mother to ask him if he would like her to make the decision for him, and she supports DNR/DNI.  I'll revisit this again tomorrow.  > Health Care Directive: NO  > POLST:  No; mother is the default decision make should he lose capacity as his father is dead.      # Goals/Dispo:  Wants to go home so he can resume radiation and start oral chemo upon completion of radiation, as previously planned  > 11/27: mother tells me she wants to take him home with hospice care; we can't act on this until he expresses his agreement so will review this with him tomorrow.  > 11/28: stepfather needs one more day to prepare their home for the equipment that hospice plans to deliver tomorrow morning.  I sense that he is anxious about this as well.     Thank you for the opportunity to be of service to this patient and family.      Shane Guerrero (Ann)  [employer]  Palliative Care  [facility] cell:  [phone]       Face to face:   1000 - 1110, 70 min, > 50% spent meeting with patient, family and [facility] hospice reviewing needs and plans for discharge, expected tomorrow.  Non face to face:  1700-[AT1.1]1745[AT1.2],[AT1.1] 45 min[AT1.2], > 50% spent[AT1.1] reviewing and writing prescriptions in anticipation of discharge and coordinating care with  attending, nursing and Care Transitions and writing dishcarge instructions.[AT1.2]     ========================    SUBJECTIVE:  Bowels moved!  Awake.  Still quite weak; has decided to NOT continue with radiation.  Also doesn't think he's strong enough to stand and take the 3 steps needed to enter the front door, so Care Transitions is making arrangements for a stretcher transport home.  Step father asked for 2 more days so he has time to prepare the house; I advised him we could give him one more day only due to insurance restrictions.  Raphael wants hardin to be left in so he doesn't have to stand to urinate.  Hospice plans to deliver BSC and hospital bed and wheelchair.      ROS:  As described in HPI and Subjective.  Otherwise, ALL are negative.    MEDICATIONS:  No Known Allergies  Scheduled meds:    atorvastatin  10 mg Oral At Bedtime     busPIRone  10 mg Oral BID     dexamethasone  8 mg Oral Q12H MONICA     dronabinol  5 mg Oral BID     enoxaparin  40 mg Subcutaneous Q24H     heparin  5 mL Intracatheter Q28 Days     heparin lock flush  5-10 mL Intracatheter Q24H     insulin aspart  1-10 Units Subcutaneous TID AC     insulin aspart  1-7 Units Subcutaneous At Bedtime     levETIRAcetam  500 mg Oral BID     metFORMIN  500 mg Oral BID w/meals     methylphenidate  5 mg Oral BID     pantoprazole  40 mg Oral QAM AC     senna-docusate  1 tablet Oral BID    Or     senna-docusate  2 tablet Oral BID     PRN meds:  acetaminophen, acetaminophen, albuterol, benzonatate, bisacodyl **OR** bisacodyl **OR** bisacodyl, calcium carbonate, glucose  **OR** dextrose **OR** glucagon, heparin lock flush, HYDROcodone-acetaminophen, HYDROmorphone, lidocaine 4%, lidocaine 4%, lidocaine (buffered or not buffered), LORazepam, magnesium citrate, melatonin, naloxone, ondansetron **OR** ondansetron, polyethylene glycol, prochlorperazine **OR** prochlorperazine **OR** prochlorperazine, sodium chloride (PF), sodium chloride (PF)      OBJECTIVE:[AT1.1]  Patient Vitals for the past 24 hrs:   BP Temp Temp src Heart Rate Resp SpO2 Weight   11/28/18 1500 118/70 - - 103 25 94 % -   11/28/18 1400 118/70 - - 103 25 - -   11/28/18 1200 116/73 - - 108 18 94 % -   11/28/18 1000 123/75 - - 111 22 94 % -   11/28/18 0800 115/73 98.5  F (36.9  C) Oral 104 16 91 % -   11/28/18 0623 - - - - - - 211 lb 10.3 oz (96 kg)   11/28/18 0600 117/82 - - 101 24 91 % -   11/28/18 0500 - - - - - 93 % -   11/28/18 0400 111/78 - - 93 23 92 % -   11/28/18 0330 - - - - - 92 % -   11/28/18 0300 - - - - - 92 % -   11/28/18 0200 117/67 - - 101 10 93 % -   11/28/18 0100 106/54 - - 97 20 95 % -   11/28/18 0000 104/60 - - 96 22 91 % -   11/27/18 2300 107/61 98.2  F (36.8  C) Oral 96 25 91 % -   11/27/18 2200 114/78 - - 98 20 95 % -   11/27/18 2100 109/66 - - 98 27 95 % -   11/27/18 2000 104/67 - - 105 22 96 % -   11/27/18 1937 107/69 98.1  F (36.7  C) Oral 106 26 98 % -   11/27/18 1900 - - - - - 96 % -   11/27/18 1800 - - - - 22 94 % -[AT1.3]      Wt Readings from Last 10 Encounters:   11/28/18 211 lb 10.3 oz (96 kg)   11/09/18 233 lb (105.7 kg)     Awake!  Daughter, mother, stepfather and 2 hospice reps at his bedside  Appears in good spirits, somewhat anxious  CV RRR  Lungs diminished  Abd obese, hypoactive bowel sounds   Ext warm, without edema     Intake/Output Summary (Last 24 hours) at 11/28/18 1702  Last data filed at 11/28/18 1400   Gross per 24 hour   Intake              700 ml   Output             1700 ml   Net            -1000 ml         ROUTINE ICU LABS (Last four results)  CMP  Recent Labs  Lab  11/28/18  0610 11/27/18  0620 11/26/18  0455 11/26/18  0045 11/25/18  1659    135 136 137 136   POTASSIUM 3.9 3.6 3.5 4.0 4.0   CHLORIDE 92* 92* 93* 97 98   CO2 35* 36* 34* 33* 30   ANIONGAP 6 7 9 7 8   * 234* 244* 256* 281*   BUN 28 27 25 24 28   CR 1.10 1.32* 1.19 1.15 1.08   GFRESTIMATED 74 60* 68 71 76   GFRESTBLACK >90 73 82 86 >90   MED 9.8 11.6* 11.7* 11.1* 10.9*   PROTTOTAL 5.9*  --  6.6* 6.2* 5.8*   ALBUMIN 2.3*  --  2.6* 2.5* 2.3*   BILITOTAL 0.4  --  0.4 0.4 0.2   ALKPHOS 47  --  49 47 45   AST 14  --  16 17 14   ALT 17  --  18 18 16     CBC  Recent Labs  Lab 11/26/18  0455 11/26/18  0045 11/23/18  1801   WBC 10.1 9.5 11.1*   RBC 5.08 4.87 5.34   HGB 14.2 13.9 15.2   HCT 43.5 41.7 45.8   MCV 86 86 86   MCH 28.0 28.5 28.5   MCHC 32.6 33.3 33.2   RDW 12.7 12.8 12.9    182 192     INRNo lab results found in last 7 days.  Arterial Blood GasNo lab results found in last 7 days.    No results found for this or any previous visit (from the past 48 hour(s)).[AT1.1]       Revision History        User Key Date/Time User Provider Type Action    > AT1.2 11/28/2018  5:46 PM Belgica Guerrero APRN CNP Nurse Practitioner Sign     AT1.3 11/28/2018  5:39 PM Belgica Guerrero APRN CNP Nurse Practitioner      AT1.1 11/28/2018  5:02 PM Belgica Guerrero APRN CNP Nurse Practitioner             Progress Notes by Bushweiler, Nichole C, RN at 11/28/2018  3:07 PM     Author:  Bushweiler, Nichole C RN Service:  (none) Author Type:  Registered Nurse    Filed:  11/28/2018  3:11 PM Date of Service:  11/28/2018  3:07 PM Creation Time:  11/28/2018  3:07 PM    Status:  Signed :  Bushweiler, Nichole C, RN (Registered Nurse)         Pt had a care conference with Shane NIELSON NP and [facility] at 10AM: plan for discharge tomorrow at 11AM. Family has been present at bedside throughout most of the day. Pt has been resting in bed throughout the day, declines wanting to get up in the chair. Encouragement needed for  repositioning. Verbalized abdominal/back discomfort throughout the day, given a dose of PRN norco and was able to rest on and off following this. Only eating very small amounts with meals, mainly drinking his Boost and small sips of milk/water. Transferred to M/S status this afternoon. Declined wanting to get washed up as he states he had a bed bath late last evening. Call light within reach. Alarms activated and audible.[NB1.1]      Revision History        User Key Date/Time User Provider Type Action    > NB1.1 11/28/2018  3:11 PM Bushweiler, Nichole C, RN Registered Nurse Sign            Progress Notes by Surinder Rivera MD at 11/28/2018 12:39 PM     Author:  Surinder Rivera MD Service:  Hospitalist Author Type:  Physician    Filed:  11/28/2018 12:51 PM Date of Service:  11/28/2018 12:39 PM Creation Time:  11/28/2018 12:39 PM    Status:  Signed :  Surinder Rivera MD (Physician)         [facility]    Hospital Medicine Progress Note  Date of Service: 11/28/2018    Assessment & Plan     Raphael Marin is a 38 year old male with a past medical history significant for 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          Declining functional status in patient with metastatic cancer  Overall not doing well since starting whole brain radiation. Poor oral intake, dehydrated, generally weak. Family is concerned about his safety at home while they are gone at work. Patient was a direct admit request from Dr. Quijano (radiation oncologist) for further evaluation.  Remains deconditioned and weak, remains in ICU due to hypoxemia.      LISSETTE De La Paz CNP met with patient and his mom 11/27/18 morning to review palliative care issues  and to discuss plans for future care and end-of-life planning.  I met with patient and his mom the same evening to discuss the same.  Patient's primary goal is to get home; he will be going to live with his mother as he cannot live independently as ill as he is.  He is currently Full Code status by his own decision, but is now undecided about that.  There is a plan in place for a team from [facility] to meet with him and his mom here in-house; that meeting occurred at 1000 today, 11/28/18.  Raphael tells me that he has decided to enroll into Hospice.  He cannot be discharged to his mom's home today as Hospice needs more time to get equipment into the home, including a bed.  Discharge to Hospice is planned for 1100 tomorrow.  - Discharge to [facility] at 1100 tomorrow.  - Continue high calorie/high protein supplements recommended in addition to general diet per RD recommendations, which are ordered.          Abdominal pain, generalized  Poor oral intake  Postprandial fullness  On admission reported inconsistent bowel movements, nausea, and vomiting. Develops postprandial fullness after just a few bites of food. Afebrile, minimal leukocytosis (WBC 11.1) upon admission.  CT abdomen 11/23/18 showed significant increased in tumor burden in the lungs, abd lymph nodes, left kidney bed and left adrenal gland, but no evidence of bowel obstruction.  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  Currently reports no abdominal pain, no nausea or recent emesis, abdominal exam unremarkable.  Able to take in some food, and Boost.  - Some improvement in abdominal symptoms with dronabinol; dose increased 11/27 to 5 mg BID.  - Palliative Care has qualified him for medical marijuana upon discharge.         Hypercalcemia   Etiology not entirely clear, but has lucencies in the bilateral humeral heads on chest CT suggestive of bony mets.  May also be due to a PTHrH from neoplasm; labs pending.  Ca 12.5 on  admission, treated with IVF, improved to 10.9 on 11/25/18.  So IVF stopped.  Ca then 11.1-->11.7 this morning after furosemide 40 mg x3 doses yesterday.  Ca ionized 6.6 this morning.  Given continued rise in Ca despite IVF and loop diuretics, I gave him zoledronic acid 4 mg x1 on 11/26/18.  Ca ionized slightly better at 6.2 as of 11/27/18.  - PTH low, which is a non-specific finding, with PTHrH pending.  - Next zoledronic acid could be given on or after 12/3/18, if needed.  - I did not order repeat labs given transition to Hospice.     Brain mets with generalized edema  -CT head done 11/25/18:  1. Interval right frontal craniotomy for tumor resection.  2. Increasing areas of vasogenic edema in both hemispheres with  associated underlying lesions. The pattern suggests progressive  metastatic disease. Clinical correlation suggested.  3. No evidence for intracranial hemorrhage or midline shift.   - Suspect edema described above is due to XRT, but can discuss with Dr Quijano, Radiation Oncology, to get his opinion.  - Remains on dexamethasone 8 mg BID.      Generalized weakness, risk for falls  Will be alone all day starting this week while family is at work. Family concerned about patient's safety with his weakness.  Brain mets likely affecting patient's balance and overall function.   - PT/OT evaluations can stop given transition to Hospice.          Cough   Chronic cough. Known lung mets. Chest CT on 11/26/18 confirms the present of extensive metastatic disease to both lungs in the form of large bilateral masses.  Cough reported to be mild and non-productive this morning - low suspicion for pneumonia. Cough managed prior to admission with Tessalon and albuterol without much relief.  - Continue benzonatate and albuterol PRN.          Tachycardia   HR 93 - 123 last 24 hours.  Tachycardia has been mild and persistent since admission.  Cause unclear.  No evidence of PE on chest CT 11/26/18.   - Observe HR.          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 S/P left nephrectomy in June 2018, S/P brain tumor resection on 11/1/18, all through [facility]. Follows with oncology Dr. Rodriguez through [facility], but is doing radiation at [facility], which is closer to home. On day of admission (11/23/2018) he received round 6 of 10 of whole brain radiation. Has been prescribed carozantinib for RCC but has not yet started taking it. Receives decadron 4 mg q 6 hours prior to admission for radiation induced inflammation. On Keppra prior to admission.  No seizure activity seen here since admission.  - Hold prior to admission carozantinib  - Continue prior to admission dexamethasone, though dose changed from 4 mg Q6H to 8 mg Q12H.  - Continue prior to admission Keppra.          Type 2 diabetes mellitus treated without insulin  Hgb A1c 6.7 on admission.  Managed prior to admission with metformin 1000 mg BID, though is not receiving metformin here in-house; can't find documentation as to why as GFR has been OK.  Resumed metformin at lower than preadmission dose at 500 mg BID on 11/26/18.  Glucose remains consistently high, 256 to 327 last 5 checks.  - Will continue metformin 500 mg BID without dose increase despite persistent hyperglycemia, as I am concerned that patient's PO intake is likely to decline in near future, which could cause hypoglycemia.  - Continue high dose sliding scale insulin, though that will stop at discharge.  - Hyper/hypoglycemia protocol.          Essential hypertension with goal blood pressure less than 130/80  Not on antihypertensive medications prior to admission.  BP OK here on no Rx.  - Continue to observe off of medications.          Anxiety disorder due to general medical condition  Stable mood upon admission. Managed prior to admission with Buspar, continue.    Hypoxemic respiratory failure, acute  O2 needs increased over night 11/25-26, transferred to ICU.  BiPAP  "used for about an hour, patient had difficulties tolerating it so it has been removed.  Oxygenation has gradually improved; currently on O2 per 5 L cannula, SpO2 91-94%.  Denies dyspnea or respiratory chest pain.  CT chest 11/26/18 showed no PE, extensive bilateral masses unchanged, mild perihilar airspace opacities new from prior study possibly representing pulmonary edema.  I haven't wanted to give diuretics given persistent tachycardia.  - Continue O2 per cannula, current flow of 5 L/min is adequate.  When patient is discharged to Hospice, though I think we should accept lower SpO2 as long as he is asymptomatic, even in the 80's.          Fluids: Saline lock, oral only.  Electrolytes: Monitor  Nutrition: Regular diet with high calorie/high protein supplements, but not eating much.  Puentes remains in place, patient elects to be discharged with Puentes in.      DVT Prophylaxis: Enoxaparin.  Will stop on hospital discharge.  Code Status: Will convert to DNR/DNI given conversion to Hospice.     Dispo:   Discharge to patient's mother's home planned for 1100 tomorrow, where he will be enrolled in [facility].    Attestation:  I have reviewed today's vital signs, notes, medications, labs and imaging.  Amount of time performed on this follow-up visit: 20 minutes, 15 of which were spent in care coordination and counseling.    Surinder Rivera MD      Interval History   Denies dyspnea at rest.  Cough is less severe, is non-productive, only mildly bothersome.  No respiratory chest pain.  No current nausea, no recent emesis.  Not eating much, reports early satiety.  No chest pain or orthopnea.  Only current pain is \"low back strain\" for which he was just medicated, and which is improving.    Physical Exam   Temp:  [97.9  F (36.6  C)-98.5  F (36.9  C)] 98.5  F (36.9  C)  Heart Rate:  [] 111  Resp:  [10-27] 22  BP: (104-139)/(54-82) 123/75  SpO2:  [91 %-98 %] 94 %    Weights:   Vitals:    11/25/18 0607 11/26/18 0300 " 11/28/18 0623   Weight: 75.5 kg (166 lb 7.2 oz) 98 kg (216 lb 0.8 oz) 96 kg (211 lb 10.3 oz)    Body mass index is 31.25 kg/(m^2).    GENERAL: Pleasant man, looks fatigued, but in no distress.  EYES: Eyes grossly normal to inspection, extraocular movements intact  HENT: Nares patent bilaterally, no discharge.  NECK: Trachea midline, no stridor.    RESP: No accessory muscle use.  Symmetrical breath sounds.  Scattered areas of coarse crackles on inspiration over both sides, less than yesterday, loudest left anterior.  Expiration not prolonged, no wheeze.  CV: Regular rate and rhythm, mildly tachycardic.  Normal S1 S2, no murmur or extra sound.  No lower extremity edema.  ABDOMEN: Soft, non-tender, no guarding.  Liver and spleen not palpable, no masses palpable.  Bowel sounds positive.  MS: No bony deformities noted.  No red or inflamed joints.  SKIN: Warm and dry, no rashes.  NEURO: Appears mildly somnolent, but is oriented, conversant.  Cranial nerves II - XII grossly intact.  No gross motor or sensory deficits.    PSYCH: Calm, mildly somnolent, conversant.  Able to articulate logical thoughts, no tangential thoughts, no hallucinations or delusions.          Data     Recent Labs  Lab 11/28/18  0610 11/27/18  0620 11/26/18  0455 11/26/18  0045  11/23/18  1801   WBC  --   --  10.1 9.5  --  11.1*   HGB  --   --  14.2 13.9  --  15.2   MCV  --   --  86 86  --  86   PLT  --   --  190 182  --  192    135 136 137  < > 136   POTASSIUM 3.9 3.6 3.5 4.0  < > 4.5   CHLORIDE 92* 92* 93* 97  < > 101   CO2 35* 36* 34* 33*  < > 24   BUN 28 27 25 24  < > 33*   CR 1.10 1.32* 1.19 1.15  < > 1.10   ANIONGAP 6 7 9 7  < > 11   MED 9.8 11.6* 11.7* 11.1*  < > 12.5*   * 234* 244* 256*  < > 239*   ALBUMIN 2.3*  --  2.6* 2.5*  < >  --    PROTTOTAL 5.9*  --  6.6* 6.2*  < >  --    BILITOTAL 0.4  --  0.4 0.4  < >  --    ALKPHOS 47  --  49 47  < >  --    ALT 17  --  18 18  < >  --    AST 14  --  16 17  < >  --    < > = values in this  interval not displayed.      Recent Labs  Lab 11/28/18  1145 11/28/18  0610 11/28/18  0200 11/27/18  2156 11/27/18  1702 11/27/18  1151 11/27/18  0639 11/27/18  0620  11/26/18  0455 11/26/18  0045  11/25/18  1659  11/23/18  1801   GLC  --  256*  --   --   --   --   --  234*  --  244* 256*  --  281*  --  239*   *  --  277* 285* 281* 291* 214*  --   < >  --   --   < >  --   < >  --    < > = values in this interval not displayed.     Unresulted Labs Ordered in the Past 30 Days of this Admission     Date and Time Order Name Status Description    11/26/2018 0000 PTH Related Peptide Test In process            Imaging  No results found for this or any previous visit (from the past 24 hour(s)).     I reviewed all new labs and imaging results over the last 24 hours. I personally reviewed no images or EKGs today.    Medications       atorvastatin  10 mg Oral At Bedtime     busPIRone  10 mg Oral BID     dexamethasone  8 mg Oral Q12H MONICA     dronabinol  5 mg Oral BID     enoxaparin  40 mg Subcutaneous Q24H     heparin  5 mL Intracatheter Q28 Days     heparin lock flush  5-10 mL Intracatheter Q24H     insulin aspart  1-10 Units Subcutaneous TID AC     insulin aspart  1-7 Units Subcutaneous At Bedtime     levETIRAcetam  500 mg Oral BID     metFORMIN  500 mg Oral BID w/meals     methylphenidate  5 mg Oral BID     pantoprazole  40 mg Oral QAM AC     senna-docusate  1 tablet Oral BID    Or     senna-docusate  2 tablet Oral BID       Surinder Rivera MD[JM1.1]            Revision History        User Key Date/Time User Provider Type Action    > JM1.1 11/28/2018 12:51 PM Surinder Rivera MD Physician Sign            Progress Notes by April Guerrier RN at 11/28/2018  2:30 AM     Author:  April Guerrier RN Service:  ICU Author Type:  Registered Nurse    Filed:  11/28/2018  4:14 AM Date of Service:  11/28/2018  2:30 AM Creation Time:  11/28/2018  4:10 AM    Status:  Signed :  April Guerrier RN (Registered  Nurse)         Pt has been maintaining his sat's in the mid 90's on 10 L oxymizer canula, several time the canula wasn't even in his nose.  Will try pt on a regular NC on 4 L and monitor his oxygen sat's.  Pt is aware that if he doesn't maintain his sat's >90% he would have to go back on a oxymizer.  Pt verbalized an understanding, will continue to monitor.[PC1.1]     Revision History        User Key Date/Time User Provider Type Action    > PC1.1 11/28/2018  4:14 AM April Guerrier RN Registered Nurse Sign            Progress Notes by April Guerrier RN at 11/28/2018  3:54 AM     Author:  April Guerrier RN Service:  ICU Author Type:  Registered Nurse    Filed:  11/28/2018  4:10 AM Date of Service:  11/28/2018  3:54 AM Creation Time:  11/28/2018  3:54 AM    Status:  Signed :  April Guerrier RN (Registered Nurse)         After repositioning pt to his right side at 0200 he just couldn't get comfortable, in fact the more we tried the worse his pain got.  He is experiencing severe pain in his left side towards his back between his rib cage and his hip.  Pt was finally comfortable after receiving one Norco po, 0.5 mg of IV Ativan and an Aqua-K applied to his left back area. This writer explained the importance of keeping his pain under control.  Pt then expressed his concerns of taking pain med's and getting constipated.  This writer acknowledged his concerns but also explained that there are medications to help keep him regular.  Pt verbalized an understanding.  Will continue to monitor close for changes.[PC1.1]     Revision History        User Key Date/Time User Provider Type Action    > PC1.1 11/28/2018  4:10 AM April Guerrier RN Registered Nurse Sign            Progress Notes by Surinder Rivera MD at 11/27/2018  3:39 PM     Author:  Surinder Rivera MD Service:  Hospitalist Author Type:  Physician    Filed:  11/27/2018  5:17 PM Date of Service:  11/27/2018  3:39 PM Creation Time:   11/27/2018  3:39 PM    Status:  Signed :  Surinder Rivera MD (Physician)         [facility]    Hospital Medicine Progress Note  Date of Service: 11/27/2018    Assessment & Plan     Raphael Marin is a 38 year old male with a past medical history significant for 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        [JM1.1]  Declining functional status in patient with metastatic cancer[JM1.2]  Overall not doing well since starting whole brain radiation. Poor oral intake, dehydrated, generally weak. Family is concerned about his safety at home while they are gone at work. Patient was a direct admit request from Dr. Quijano (radiation oncologist) for further evaluation.  Remains deconditioned and weak,[JM1.1] remains[JM1.2] in ICU due to hypoxemia.[JM1.1]      LISSETTE De La Paz CNP met with patient and his mom this morning to review palliative care issues and to discuss plans for future care and end-of-life planning.  I met with patient and his mom this evening to discuss the same.  Patient's primary goal is to get home; he will be going to live with his mother as he cannot live independently as ill as he is.  He is currently Full Code status by his own decision, but is now undecided about that.  There is a plan in place for a team from [facility] to meet with him and his mom here in-house at 1000 tomorrow.  He is aware that Hospice implies end-of-life, and that his participation would include DNR/DNI status.  His mom endorses DNR/DNI, but defers to Raphael to make his own decision.  At the end of this visit, we have decided that I should check in at the end of tomorrow's Hospice meeting.  If Raphael enrolls and wants to go home, we  may be able to arrange that for tomorrow afternoon, or if not, Thursday 11/29.[JM1.2]     - No longer on IVF; monitor oral intake.  - Nutrition consult re[JM1.1]commends[JM1.2] high calorie/high protein supplements recommended in addition to general diet[JM1.1], which are ordered.  - Will follow-up after Hospice meeting tomorrow, details above.[JM1.2]          Abdominal pain, generalized  Poor oral intake  Postprandial fullness  On admission reported inconsistent bowel movements, nausea, and vomiting. Develops postprandial fullness after just a few bites of food. Afebrile, minimal leukocytosis (WBC 11.1) upon admission.  CT abdomen 11/23/18 showed significant increased in tumor burden in the lungs, abd lymph nodes, left kidney bed and left adrenal gland, but no evidence of bowel obstruction.  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  Currently reports no abdominal pain, no nausea or recent emesis, abdominal exam unremarkable.[JM1.1]  Able to take in some food, and Boost.[JM1.2]  - Some improvement in abdominal symptoms with dronabinol[JM1.1]; dose increased today, 11/27.[JM1.2]  - Palliative Care has qualified him for medical marijuana upon discharge.[JM1.1]  - Hospice meeting tomorrow.[JM1.2]         Hypercalcemia   Etiology not entirely clear, but has lucencies in the bilateral humeral heads on chest CT suggestive of bony mets.  May also be due to a PTHrH from neoplasm; labs pending.  Ca 12.5 on admission, treated with IVF, improved to 10.9 on 11/25/18.  So IVF stopped.  Ca then 11.1-->11.7 this morning after furosemide 40 mg x3 doses yesterday.  Ca ionized 6.6 this morning.  Given[JM1.1] continued[JM1.2] rise in Ca despite IVF and loop diuretics,[JM1.1] I gave him[JM1.2] zoledronic acid 4 mg x1[JM1.1] on 11/26/18.  Ca ionized slightly better at 6.2 this morning.[JM1.2]  - PTH low, which is a non-specific finding, with PTHrH pending.[JM1.1]  - Next zoledronic acid could be given on or after  12/3/18, if needed.[JM1.2]     Brain mets with generalized edema  -CT head done 11/25/18:  1. Interval right frontal craniotomy for tumor resection.  2. Increasing areas of vasogenic edema in both hemispheres with  associated underlying lesions. The pattern suggests progressive  metastatic disease. Clinical correlation suggested.  3. No evidence for intracranial hemorrhage or midline shift.   - Suspect edema described above is due to XRT, but[JM1.1] can[JM1.2] discuss with Dr Quijano, Radiation Oncology, to get his opinion.  - Remains on dexamethasone 8 mg BID.      Generalized weakness, risk for falls  Will be alone all day starting this week while family is at work. Family concerned about patient's safety with his weakness.  Brain mets likely affecting patient's balance and overall function.   - PT/OT evaluations underway.  - SW consult, will likely benefit from home health care.[JM1.1]  - Plan for home care vs Hospice at discharge, details above.[JM1.2]            Cough   Chronic cough. Known lung mets. Chest CT on 11/26/18 confirms the present of extensive metastatic disease to both lungs in the form of large bilateral masses.  Cough reported to be mild and non-productive this morning. - low suspicion for pneumonia. Cough managed prior to admission with Tessalon and albuterol without much relief.  - Continue benzonatate and albuterol PRN.          Tachycardia   HR 9[JM1.1]3[JM1.2] - 1[JM1.1]23[JM1.2] last 24 hours.  Tachycardia has been mild and persistent since admission.  Cause unclear.  N[JM1.1]o[JM1.2] evidence of PE on chest CT 11/26/18.   - Observe HR.          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 S/P left nephrectomy in June 2018, S/P brain tumor resection on 11/1/18, all through [facility]. Follows with oncology Dr. Rodriguez [facility], but is doing radiation at [facility], which is closer to home. On day of admission (11/23/2018) he received  round 6 of 10 of whole brain radiation. Has been prescribed carozantinib for RCC but has not yet started taking it. Receives decadron 4 mg q 6 hours prior to admission for radiation induced inflammation. On Keppra prior to admission.[JM1.1]  No seizure activity seen here since admission.[JM1.2]  - Hold prior to admission carozantinib  - Continue prior to admission dexamethasone, though dose changed from 4 mg Q6H to 8 mg Q12H.  - Continue prior to admission Keppra.          Type 2 diabetes mellitus treated without insulin  Hgb A1c 6.7 on admission.  Managed prior to admission with metformin 1000 mg BID, though is not receiving metformin here in-house; can't find documentation as to why as GFR has been OK.[JM1.1]  Resumed metformin at lower than preadmission dose at 500 mg BID on 11/26/18.[JM1.2]  Glucose[JM1.1] remains[JM1.2] consistently high, 1[JM1.1]94 to 291[JM1.2] last 5 checks.  -[JM1.1] Will continue[JM1.2] metformin 500 mg BID[JM1.1] despite persistent hyperglycemia, as I am concerned that patient's PO intake is likely to decline in near future, which could cause hypoglycemia.[JM1.2]  - Continue high dose sliding scale insulin  - Hyper/hypoglycemia protocol.          Essential hypertension with goal blood pressure less than 130/80  Not on antihypertensive medications prior to admission.  BP OK here on no Rx.  - Continue to observe off of medications.          Anxiety disorder due to general medical condition  Stable mood upon admission. Managed prior to admission with Buspar, continue.    Hypoxemic respiratory failure, acute  O2 needs increased over night 11/25-26, transferred to ICU.  BiPAP used for about an hour, patient had difficulties tolerating it so it has been removed.  Currently on O2 per 10 L mask.  Denies dyspnea or respiratory chest pain, finds the mask to be uncomfortable.  CT chest 11/26/18 showed no PE, extensive bilateral masses unchanged, mild perihilar airspace opacities new from prior study  possibly representing pulmonary edema.[JM1.1]  I haven't wanted to give diuretics given persistent tachycardia.[JM1.2]  -[JM1.1] Continue O2 at 8 to 10 L/min.  I don't think there is probably much reversible disease in his lungs, so hypoxemia may not improve over time.[JM1.2]  If hypoxemia worsens,[JM1.1] could[JM1.2] give another dose furosemide.[JM1.1]  - Can arrange for high flow O2 at home if patient is discharged, though I think we should accept lower SpO2 as long as he is asymptomatic, even 88%.[JM1.2]          Fluids: Saline lock, oral only.  Electrolytes: Monitor  Nutrition: Regular diet with high calorie/high protein supplements, but not eating much.      DVT Prophylaxis: Enoxaparin.  Code Status: Full Code - discussed directly with patient and his family     Dispo:[JM1.1]   Hospice meeting in the patient's hospital room tomorrow at 1000.  Will make discharge plans based upon the outcome of that meeting.  Overall plan is for discharge to patient's mother's home, either with Home Care or under Hospice.[JM1.2]    Attestation:  I have reviewed today's vital signs, notes, medications, labs and imaging.  Amount of time performed on this[JM1.1] follow-up[JM1.2] visit:[JM1.1] 45[JM1.2] minutes[JM1.1], 25 of which were spent in care coordination and counseling.[JM1.2]    Surinder Rivera MD      Interval History   Denies dyspnea at rest.  Cough is less severe, is non-productive[JM1.1], only mildly bothersome.[JM1.2]  No respiratory chest pain.  No current nausea, no recent emesis.  Not eating much, reports early satiety.  No chest pain or orthopnea.    Physical Exam   Temp:  [97.5  F (36.4  C)-98.5  F (36.9  C)] 98.5  F (36.9  C)  Heart Rate:  [104-123] 111  Resp:  [0-32] 0  BP: (110-139)/(69-82) 135/69  SpO2:  [89 %-94 %] 93 %    Weights:   Vitals:    11/24/18 0623 11/25/18 0607 11/26/18 0300   Weight: 79.4 kg (175 lb 0.7 oz) 75.5 kg (166 lb 7.2 oz) 98 kg (216 lb 0.8 oz)    Body mass index is 31.91  kg/(m^2).    GENERAL: Pleasant man, looks fatigued, but in no distress.  EYES: Eyes grossly normal to inspection, extraocular movements intact  HENT: Deferred due to O2 mask.  NECK: Trachea midline, no stridor.    RESP: No accessory muscle use.  Symmetrical breath sounds.  Scattered areas of coarse crackles on inspiration over both sides, loudest left anterior.  Expiration not prolonged, no wheeze.  CV: Regular rate and rhythm, mildly tachycardic.  Normal S1 S2, no murmur or extra sound.  No lower extremity edema.  ABDOMEN: Soft, non-tender, no guarding.  Liver and spleen not palpable, no masses palpable.  Bowel sounds positive.  MS: No bony deformities noted.  No red or inflamed joints.  SKIN: Warm and dry, no rashes.  NEURO: Alert, oriented, conversant.  Cranial nerves II - XII grossly intact.  No gross motor or sensory deficits.    PSYCH: Calm, alert, conversant.  Able to articulate logical thoughts, no tangential thoughts, no hallucinations or delusions.  Affect normal.        Data     Recent Labs  Lab 11/27/18  0620 11/26/18  0455 11/26/18  0045  11/23/18  1801   WBC  --  10.1 9.5  --  11.1*   HGB  --  14.2 13.9  --  15.2   MCV  --  86 86  --  86   PLT  --  190 182  --  192    136 137  < > 136   POTASSIUM 3.6 3.5 4.0  < > 4.5   CHLORIDE 92* 93* 97  < > 101   CO2 36* 34* 33*  < > 24   BUN 27 25 24  < > 33*   CR 1.32* 1.19 1.15  < > 1.10   ANIONGAP 7 9 7  < > 11   MED 11.6* 11.7* 11.1*  < > 12.5*   * 244* 256*  < > 239*   ALBUMIN  --  2.6* 2.5*  < >  --    PROTTOTAL  --  6.6* 6.2*  < >  --    BILITOTAL  --  0.4 0.4  < >  --    ALKPHOS  --  49 47  < >  --    ALT  --  18 18  < >  --    AST  --  16 17  < >  --    < > = values in this interval not displayed.      Recent Labs  Lab 11/27/18  1151 11/27/18  0639 11/27/18  0620 11/27/18  0224 11/26/18  2126 11/26/18  1623 11/26/18  1123  11/26/18  0455 11/26/18  0045  11/25/18  1659  11/23/18  1801   GLC  --   --  234*  --   --   --   --   --  244* 256*  --   281*  --  239*   * 214*  --  252* 194* 253* 301*  < >  --   --   < >  --   < >  --    < > = values in this interval not displayed.     Unresulted Labs Ordered in the Past 30 Days of this Admission     Date and Time Order Name Status Description    11/26/2018 0000 PTH Related Peptide Test In process            Imaging  No results found for this or any previous visit (from the past 24 hour(s)).     I reviewed all new labs and imaging results over the last 24 hours. I personally reviewed[JM1.1] no images or EKGs today[JM1.2].    Medications       atorvastatin  10 mg Oral At Bedtime     bisacodyl  10 mg Rectal Once     busPIRone  10 mg Oral BID     dexamethasone  8 mg Oral Q12H MONICA     dronabinol  5 mg Oral BID     enoxaparin  40 mg Subcutaneous Q24H     heparin  5 mL Intracatheter Q28 Days     heparin lock flush  5-10 mL Intracatheter Q24H     insulin aspart  1-10 Units Subcutaneous TID AC     insulin aspart  1-7 Units Subcutaneous At Bedtime     levETIRAcetam  500 mg Oral BID     metFORMIN  500 mg Oral BID w/meals     methylphenidate  5 mg Oral BID     pantoprazole  40 mg Oral QAM AC     senna-docusate  1 tablet Oral BID    Or     senna-docusate  2 tablet Oral BID       Surinder Rivera MD[JM1.1]            Revision History        User Key Date/Time User Provider Type Action    > JM1.2 11/27/2018  5:17 PM Surinder Rivera MD Physician Sign     JM1.1 11/27/2018  3:39 PM Surinder Rivera MD Physician             Progress Notes by Neha Godinez RN at 11/27/2018  2:10 PM     Author:  Neha Godinez RN Service:  ICU Author Type:  Registered Nurse    Filed:  11/27/2018  2:12 PM Date of Service:  11/27/2018  2:10 PM Creation Time:  11/27/2018  2:10 PM    Status:  Signed :  Neha Godinez RN (Registered Nurse)         Pt repositioned after lunch but had some increased back pain.  Pt given ice pack with plan to try another pain med if that was not working.  Pt is resting comfortably 30 minutes after ice  pack.  Will again offer bed bath and suppository when patient wakes.  He refused after lunch, saying he was too tired.[DA1.1]      Revision History        User Key Date/Time User Provider Type Action    > DA1.1 11/27/2018  2:12 PM Neha Godinez, RN Registered Nurse Sign            Progress Notes by Belgica Guerrero APRN CNP at 11/27/2018 11:43 AM     Author:  Belgica Guerrero APRN CNP Service:  Palliative Author Type:  Nurse Practitioner    Filed:  11/27/2018 12:11 PM Date of Service:  11/27/2018 11:43 AM Creation Time:  11/27/2018 11:43 AM    Status:  Signed :  Belgica Guerrero APRN CNP (Nurse Practitioner)         Palliative Care Follow-up Clinic Note  Date of Admission:  11/23/2018   Visit Date:  November 27, 2018        HISTORY of PRESENT ILLNESS:  Raphael Marin is a 38 year old year old male admitted with renal cell carcinoma discovered in May of this year; it was metastatic to lymph and lung at the time of diagnosis, and later metastasized to the brain.  He presented here on 11/23 with weakness, falls, anorexia, failure to thrive.  He is currently undergoing brain radiation with 4 additional treatments pending, and with plans to initiate Cabozantinib, a TKI, upon completion of radiation.  He was referred to Palliative Care for certification for medical cannabis .        ASSESSMENT & PLAN:     # Metastatic renal cell carcinoma (lung, brain, lymph)  # Anorexia, weakness, weight loss; mother reports appetite yesterday improved after start of Marinol 2.5 mg and Remeron at HS  > certified Raphael as eligible for medical cannabis  > 11/27: appetite poor, possibly r/t somnolence.  Increased Dronabinol from 2.5 to 5 mg     # Constipation  > upon discharge, replace colace with Senna, and continue daily Miralax.  PRN Bisacodyl.   > 11/27: feels constipated.  Bisacodyl r/s today     # Recurrent Nausea; has no antiemetic Rx's at home.  Was given 2 doses Zofran yesterday, and one today thus far.   > see Discharge  instructions     # Somnolence possibly 2o hypercalcemia of malignancy.  Zometa given yesterday.  Doesn't meet criteria for subcutaneous miacalcin.  No IV fluids running.  > Ritalin 5 mg bid with 2nd dose of the day no later than 2pm    # Advance Care Planning:  > Code status:  Full Code     11/27: discussed with patient and mother again.  He is undecided.  I told him he could think about it, but I also advised his mother to ask him if he would like her to make the decision for him, and she supports DNR/DNI.  I'll revisit this again tomorrow.  > Health Care Directive: NO  > POLST:  No; mother is the default decision make should he lose capacity as his father is dead.     # Goals/Dispo:  Wants to go home so he can resume radiation and start oral chemo upon completion of radiation, as previously planned  > 11/27: mother tells me she wants to take him home with hospice care; we can't act on this until he expresses his agreement so will review this with him tomorrow.    Thank you for the opportunity to be of service to this patient and family.      Shane Guerrero CNP ([place])  Palliative Care  Private cell:  [phone]       Face to face:   1035 - 1110, 35 min, > 50% spent discussing code status with patient and mother.   Non face to face:  1110 - 1210, 60 min, > 50% spent having repeated discussions with patient's mother about hospice and management of symptoms at home, and sources of support/assistance if she is home alone as his primary caregiver.     ========================    SUBJECTIVE:  Feels constipated.  Can't decide whether to change code status to DNR/DNI.  Remains very somnolent--ceiling lift used yesterday to get patient back into bed from chair, and no has a hardin in place.  Eating little per Mom--a full bottle of Boost instead of a meal is the best that she has witnessed.  Personal and family friends visiting though he isn't conversant with them at all.      ROS:  As described in HPI and Subjective.   Otherwise, ALL are negative.    MEDICATIONS:[AT1.1]  No Known Allergies[AT1.2]  Scheduled meds:[AT1.1]    atorvastatin  10 mg Oral At Bedtime     bisacodyl  10 mg Rectal Once     busPIRone  10 mg Oral BID     dexamethasone  8 mg Oral Q12H MONICA     dronabinol  2.5 mg Oral BID     enoxaparin  40 mg Subcutaneous Q24H     heparin  5 mL Intracatheter Q28 Days     heparin lock flush  5-10 mL Intracatheter Q24H     insulin aspart  1-10 Units Subcutaneous TID AC     insulin aspart  1-7 Units Subcutaneous At Bedtime     levETIRAcetam  500 mg Oral BID     metFORMIN  500 mg Oral BID w/meals     methylphenidate  5 mg Oral BID     pantoprazole  40 mg Oral QAM AC     senna-docusate  1 tablet Oral BID    Or     senna-docusate  2 tablet Oral BID[AT1.2]     PRN meds:[AT1.1]  acetaminophen, acetaminophen, albuterol, benzonatate, bisacodyl **OR** bisacodyl **OR** bisacodyl, glucose **OR** dextrose **OR** glucagon, heparin lock flush, HYDROcodone-acetaminophen, HYDROmorphone, lidocaine 4%, lidocaine 4%, lidocaine (buffered or not buffered), LORazepam, magnesium citrate, melatonin, naloxone, ondansetron **OR** ondansetron, polyethylene glycol, prochlorperazine **OR** prochlorperazine **OR** prochlorperazine, sodium chloride (PF), sodium chloride (PF)[AT1.2]      OBJECTIVE:[AT1.1]  Patient Vitals for the past 24 hrs:   BP Temp Temp src Heart Rate Resp SpO2   11/27/18 1100 127/77 98.5  F (36.9  C) Oral 105 15 93 %   11/27/18 0900 126/71 - - 112 20 92 %   11/27/18 0800 114/74 - - 110 25 90 %   11/27/18 0700 117/75 - - 104 (!) 0 94 %   11/27/18 0600 116/82 - - 107 (!) 32 90 %   11/27/18 0500 121/77 - - 110 13 92 %   11/27/18 0405 - - - 113 - -   11/27/18 0400 125/76 98.2  F (36.8  C) Oral 118 15 90 %   11/27/18 0300 123/72 - - 117 (!) 0 90 %   11/27/18 0200 131/76 - - 120 10 91 %   11/27/18 0100 129/71 - - 123 25 91 %   11/27/18 0005 - - - 123 - -   11/27/18 0000 127/78 97.8  F (36.6  C) Oral 123 24 (!) 89 %   11/26/18 2300 125/82 - - 118  (!) 0 93 %   11/26/18 2200 113/79 - - 113 (!) 0 90 %   11/26/18 2100 120/74 - - 112 (!) 0 92 %   11/26/18 2020 - 97.5  F (36.4  C) Oral - 12 -   11/26/18 2005 - - - 111 - -   11/26/18 2000 116/75 - - 114 21 92 %   11/26/18 1900 110/79 - - 105 (!) 0 92 %   11/26/18 1548 118/75 97.9  F (36.6  C) Axillary 115 16 92 %   11/26/18 1512 - - - 115 - -   11/26/18 1400 125/73 - - 120 23 91 %[AT1.3]     Wt Readings from Last 10 Encounters:   11/26/18 216 lb 0.8 oz (98 kg)   11/09/18 233 lb (105.7 kg)[AT1.2]     Obese male lying in bed, appears comfortable.  On 12 lpm via Oxykmask  Somnolent, but when he opens his eyes, he indicates that he has heard and understood my words  Sclera anicteric, PERRL  OP: barely opened mouth, but I couldn't see thrush or lesions on distal tongue; palate and buccal mucosa not visualized  CV RRR tachy  Lungs clear, no cough today  Abd obese, NT, hypoactive bowel sounds   MSK: minimal spontaneous movements  Ext warm, without edema   Psych: though somnolent, appears to comprehend, calm    Intake/Output Summary (Last 24 hours) at 11/27/18 1144  Last data filed at 11/27/18 0800   Gross per 24 hour   Intake             1715 ml   Output             1685 ml   Net               30 ml         ROUTINE ICU LABS (Last four results)  CMP[AT1.1]  Recent Labs  Lab 11/27/18  0620 11/26/18  0455 11/26/18  0045 11/25/18  1659    136 137 136   POTASSIUM 3.6 3.5 4.0 4.0   CHLORIDE 92* 93* 97 98   CO2 36* 34* 33* 30   ANIONGAP 7 9 7 8   * 244* 256* 281*   BUN 27 25 24 28   CR 1.32* 1.19 1.15 1.08   GFRESTIMATED 60* 68 71 76   GFRESTBLACK 73 82 86 >90   MED 11.6* 11.7* 11.1* 10.9*   PROTTOTAL  --  6.6* 6.2* 5.8*   ALBUMIN  --  2.6* 2.5* 2.3*   BILITOTAL  --  0.4 0.4 0.2   ALKPHOS  --  49 47 45   AST  --  16 17 14   ALT  --  18 18 16[AT1.2]     CBC[AT1.1]  Recent Labs  Lab 11/26/18  0455 11/26/18  0045 11/23/18  1801   WBC 10.1 9.5 11.1*   RBC 5.08 4.87 5.34   HGB 14.2 13.9 15.2   HCT 43.5 41.7 45.8   MCV 86  86 86   MCH 28.0 28.5 28.5   MCHC 32.6 33.3 33.2   RDW 12.7 12.8 12.9    182 192[AT1.2]     INR[AT1.1]No lab results found in last 7 days.[AT1.2]  Arterial Blood Gas[AT1.1]No lab results found in last 7 days.    Recent Results (from the past 48 hour(s))   CT Head w/o Contrast    Narrative    CT SCAN OF THE HEAD WITHOUT CONTRAST   11/25/2018 5:20 PM     HISTORY: Rule out ICH.    TECHNIQUE:  Axial images of the head and coronal reformations without  IV contrast material.  Radiation dose for this scan was reduced using  automated exposure control, adjustment of the mA and/or kV according  to patient size, or iterative reconstruction technique.    COMPARISON: 10/17/2018    FINDINGS: During the interval, there has been a right frontal  craniotomy for tumor resection. There is some volume loss at the site  of tumor resection. There are also bilateral areas of low density  consistent with vasogenic edema in both hemispheres with other nodular  lesion seen in the left hemisphere measuring approximately 1.2 and 0.9  cm in size. The amounts of vasogenic edema have significantly  progressed since the prior study. Ventricles are not enlarged. There  is no evidence for intracranial hemorrhage or acute infarct.      Impression    IMPRESSION:  1. Interval right frontal craniotomy for tumor resection.  2. Increasing areas of vasogenic edema in both hemispheres with  associated underlying lesions. The pattern suggests progressive  metastatic disease. Clinical correlation suggested.  3. No evidence for intracranial hemorrhage or midline shift.     JOSIE SKELTON MD   Chest 2 Views*    Narrative    CHEST 2 VIEWS   11/26/2018 1:13 AM     HISTORY: New hypoxia.    COMPARISON: 11/23/2018 - CT chest, abdomen and pelvis.    FINDINGS: Moderate-sized mass-like opacities in bilateral lung bases  in addition to a few smaller patchy nodular opacities scattered within  the upper and mid lungs bilaterally, not convincingly changed since  the  comparison CT scan dated 11/23/2018, allowing for differences in  technique. Normal-sized cardiac silhouette. Right anterior chest wall  port with catheter entering the right internal jugular vein and distal  tip in the right atrium.      Impression    IMPRESSION:   1. No convincing interval change since 10/23/2018.  2. A few opacities scattered within both lungs, including dominant  moderate-sized mass-like opacities in bilateral lung bases.    JARAD NICHOLE MD   CT Chest pulmonary embolism w contrast    Narrative    CT CHEST WITH CONTRAST   11/26/2018 2:41 AM     HISTORY: New hypoxia.    COMPARISON: 11/23/2018 - CT chest, abdomen and pelvis.    TECHNIQUE: Following the uneventful administration of 80 mL Isovue-370  intravenous contrast, helical sections were acquired through the lungs  according to the pulmonary embolism protocol. Coronal reconstructions  were generated. Radiation dose for this scan was reduced using  automated exposure control, adjustment of the mA and/or kV according  to the patient's size, or iterative reconstruction technique.    FINDINGS: No visualized pulmonary embolus. The thoracic aorta is  normal in caliber without dissection. Right anterior chest wall port  with catheter entering the right internal jugular vein and distal tip  in the right atrium.    Several mass-like opacities scattered within both lungs, including a  dominant 11 cm opacity in the right lung base, not convincingly  changed since 11/23/2018. Mild groundglass opacities scattered with in  the central aspects of both lungs, new. Tiny left pleural effusion  again noted. No right pleural or pericardial effusion. Several mildly  and moderately enlarged mediastinal and right hilar lymph nodes,  unchanged. For example, there is a 2.9 x 2.8 cm right hilar lymph node  (series 4,  image 69). Patchy lucencies within bilateral humeral heads  again noted.    Scan through the upper abdomen is significant for a complex 11 x 8  cm  fluid collection abutting the posterior wall of the stomach (series 4,  image 134) that has moderately increased in size. Again noted are  moderate diffuse fatty infiltration of the liver, moderate nodularity  of the right adrenal gland, prior left nephrectomy, a 9 cm  heterogeneous mass centered in the left renal fossa and mild  splenomegaly.      Impression    IMPRESSION:   1. Mild groundglass opacities in the central aspects of both lungs  that could represent atelectasis or pulmonary edema.  2. No visualized pulmonary embolus.  3. Extensive metastatic neoplasm in the chest including several  bilateral pulmonary masses, several enlarged thoracic lymph nodes, a  probable right adrenal metastasis, probable bilateral humeral  metastasis, and a large metastasis in the left renal fossa, not  convincingly changed since the recent study dated 11/23/2018.  4. Nonspecific moderate-sized irregular-shaped 11 x 8 cm fluid  collection in the upper left hemiabdomen and abutting the posterior  wall of the stomach, moderately increased in size.    JARAD NICHOLE MD[AT1.2]          Revision History        User Key Date/Time User Provider Type Action    > AT1.3 11/27/2018 12:11 PM Belgica Guerrero, LISSETTE CNP Nurse Practitioner Sign     AT1.2 11/27/2018 11:44 AM Belgica Guerrero, LISSETTE CNP Nurse Practitioner      AT1.1 11/27/2018 11:43 AM Belgica Guerrero, APRN CNP Nurse Practitioner             Progress Notes by Tony Burgess at 11/27/2018 12:09 PM     Author:  Tony Burgess Service:  Spiritual Health Author Type:      Filed:  11/27/2018 12:11 PM Date of Service:  11/27/2018 12:09 PM Creation Time:  11/27/2018 12:09 PM    Status:  Signed :  Tony Burgess ()         SPIRITUAL HEALTH SERVICES  SPIRITUAL ASSESSMENT Progress Note  [facility] [place] - ICU    I checked in with ptRaphael, as a follow up to his visit with  on Saturday.  He was being visited by many family members and stated that he wasn't doing very well.   He declined a visit at this time.  I reiterated the availability of spiritual support at his request.    Tony Burgess M.A., [facility]  Staff   [facility]  Office: [phone]  Cell: [phone]  Pager [phone][MC1.1]       Revision History        User Key Date/Time User Provider Type Action    > MC1.1 11/27/2018 12:11 PM Tony Burgess Sign            Progress Notes by Johanson, Cindy, RN at 11/27/2018  7:01 AM     Author:  Johanson, Cindy, RN Service:  ICU Author Type:  Registered Nurse    Filed:  11/27/2018  7:04 AM Date of Service:  11/27/2018  7:01 AM Creation Time:  11/27/2018  7:01 AM    Status:  Signed :  Johanson, Cindy, RN (Registered Nurse)         LA fired this am, blood drawn and results were positive at 2.6.  Helena NAGEL , provider on call web paged these results awaiting orders.  Patient also complaining of pain first time this 12 hour shift having pain states because he has been coughing more this am despite Tesalon pearls given at 0430 this am.  Will continue to monitor closely.[CJ1.1]     Revision History        User Key Date/Time User Provider Type Action    > CJ1.1 11/27/2018  7:04 AM Johanson, Cindy, RN Registered Nurse Sign            Progress Notes by Johanson, Cindy, RN at 11/26/2018 10:00 PM     Author:  Johanson, Cindy, RN Service:  ICU Author Type:  Registered Nurse    Filed:  11/27/2018  4:41 AM Date of Service:  11/26/2018 10:00 PM Creation Time:  11/27/2018  3:53 AM    Status:  Signed :  Johanson, Cindy, RN (Registered Nurse)         Episode of nausea that resolved with Zofran IV 4 mg.  After 15 minutes was able to tolerate evening medications and keep them down.  Continues with Tachycardia rate about 120 consistently, respirations are shallow at times with oxi-plus mask at 10 LPM which i[CJ1.1]s the[CJ1.2] liter flow for entire day.[CJ1.1]  Q[CJ1.2]uestioned patient how he was feeling[CJ1.1], States just feeling exhausted and just  "wants to sleep.  Talked about trying to use BiPAP if so exhausted to allow to just and not work so hard on breathing.  Refused states he actually felt worse with high flow oxygen and BiPAP last night and would not consider their use tonight.  Will continue to monitor closely.[CJ1.2] rate[CJ1.1]     Revision History        User Key Date/Time User Provider Type Action    > CJ1.2 11/27/2018  4:41 AM Johanson, Cindy, RN Registered Nurse Sign     CJ1.1 11/27/2018  3:53 AM Johanson, Cindy, RN Registered Nurse             Progress Notes by Neha Godinez RN at 11/26/2018  6:43 PM     Author:  Neha Godinez RN Service:  ICU Author Type:  Registered Nurse    Filed:  11/26/2018  6:45 PM Date of Service:  11/26/2018  6:43 PM Creation Time:  11/26/2018  6:43 PM    Status:  Signed :  Neha Godinez RN (Registered Nurse)         Pt was up to chair at 1630 with gait belt and assistance of two.  Did feel weak, slightly dizzy but wanted to continue to take a few steps to chair.  Pt was not able to walk back to bed, stating he felt too weak.  Pt assisted to bed using ceiling lift. Continues to report that he feels sleepy.[DA1.1]      Revision History        User Key Date/Time User Provider Type Action    > DA1.1 11/26/2018  6:45 PM Neha Godinez, RN Registered Nurse Sign            Progress Notes by Tony Burgess at 11/26/2018 12:54 PM     Author:  Tony Burgess Service:  Spiritual Health Author Type:      Filed:  11/26/2018 12:57 PM Date of Service:  11/26/2018 12:54 PM Creation Time:  11/26/2018 12:55 PM    Status:  Signed :  Tony Burgess ()         SPIRITUAL HEALTH SERVICES  SPIRITUAL ASSESSMENT Progress Note  [facility] [place] - ICU    I met briefly with Raphael's family, Mom and a friend, in the ICU waiting room.  They stated that he \"was kind of out of it today\" so they were waiting to find out what the plan was for today, discharge or not, etc.  I affirmed SH availability to follow with pt and with them and " pt's Mom stated she would reach out if they or Raphael were interested in a follow up visit.    Tony Burgess M.A., [facility]  Staff [facility]  Office: [phone]  Cell: [phone]  Pager [phone][MC1.1]       Revision History        User Key Date/Time User Provider Type Action    > MC1.1 11/26/2018 12:57 PM Tony Burgess Sign            Progress Notes by Yudelka Huggins RN at 11/26/2018 11:44 AM     Author:  Yudelka Huggins RN Service:  (none) Author Type:      Filed:  11/26/2018 11:49 AM Date of Service:  11/26/2018 11:44 AM Creation Time:  11/26/2018 11:44 AM    Status:  Signed :  Yudelka Huggins RN ()         Care Transitions Progress note    Reason for Follow up: This writer spoke to the patients mother Max regarding contacting the patients [facility]  Tasia ([phone]) to inquire about possible PCA services. Tasia is out of the office through 11/26/2018, left . Will try to contact Tasia tomorrow 11/27/2018.  This writer left a VM with Max to update her of writers plan to follow up with [facility]  on 11/27/2018    Anticipated DC Needs: [facility] Palliate care and working towards [facility] PCA services.    Next Steps: Awaiting return call from [facility].    Yudelka Huggins RN Care Coordinator  [facility] [phone]  [facility] [phone][PW1.1]       Revision History        User Key Date/Time User Provider Type Action    > PW1.1 11/26/2018 11:49 AM Yudelka Huggins RN Case Manager Sign            Progress Notes by Neha Godinez RN at 11/26/2018 10:30 AM     Author:  Neha Godinez RN Service:  ICU Author Type:  Registered Nurse    Filed:  11/26/2018 10:42 AM Date of Service:  11/26/2018 10:30 AM Creation Time:  11/26/2018 10:30 AM    Status:  Signed :  Neha Godinez RN (Registered Nurse)         At 0930, pt's heart rate was 130[DA1.1] despite having ativan at 0750 for anxiety[DA1.2], denied  pain at the time[DA1.1] but had[DA1.2] intermittent sto[DA1.1]mach pain around 0730 which he reported resolved.  Dilaudid given, HR at 115.[DA1.2]     Revision History        User Key Date/Time User Provider Type Action    > DA1.2 11/26/2018 10:42 AM Neha Godinez, RN Registered Nurse Sign     DA1.1 11/26/2018 10:30 AM Neha Godinez, RN Registered Nurse             Progress Notes by Surinder Rivera MD at 11/26/2018  7:13 AM     Author:  Surinder Rivera MD Service:  Hospitalist Author Type:  Physician    Filed:  11/26/2018  7:49 AM Date of Service:  11/26/2018  7:13 AM Creation Time:  11/26/2018  7:13 AM    Status:  Signed :  Surinder Rivera MD (Physician)         [facility] Medicine Progress Note  Date of Service: 11/26/2018    Assessment & Plan     Raphael Marin is a 38 year old male with a past medical history significant for 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          Failure to thrive in adult  Overall not doing well since starting whole brain radiation. Poor oral intake, dehydrated, generally weak. Family is concerned about his safety at home while they are gone at work. Patient was a direct admit request from Dr. Quijano (radiation oncologist) for further evaluation.  Remains deconditioned and weak, now in ICU due to hypoxemia.  - No longer on IVF; monitor oral intake.  - Social work consult to help obtain resources such as home health care.  - PT/OT evaluations ongoing.  - Nutrition consult requested to help address poor appetite; high calorie/high protein supplements recommended in addition to general diet.          Abdominal pain, generalized  Poor oral intake  Postprandial fullness  On  admission reported inconsistent bowel movements, nausea, and vomiting. Develops postprandial fullness after just a few bites of food. Afebrile, minimal leukocytosis (WBC 11.1) upon admission.  CT abdomen 11/23/18 showed significant increased in tumor burden in the lungs, abd lymph nodes, left kidney bed and left adrenal gland, but no evidence of bowel obstruction.  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  Currently reports no abdominal pain, no nausea or recent emesis, abdominal exam unremarkable.  - Some improvement in abdominal symptoms with dronabinol.   - Palliative Care has qualified him for medical marijuana upon discharge.         Hypercalcemia   Etiology not entirely clear, but has lucencies in the bilateral humeral heads on chest CT suggestive of bony mets.  May also be due to a PTHrH from neoplasm; labs pending.  Ca 12.5 on admission, treated with IVF, improved to 10.9 on 11/25/18.  So IVF stopped.  Ca then 11.1-->11.7 this morning after furosemide 40 mg x3 doses yesterday.  Ca ionized 6.6 this morning.  - Given rise in Ca despite recent IVF and loop diuretics, will give zoledronic acid 4 mg x1 today.  - PTH and PTHrH pending.     Brain mets with generalized edema  -CT head done 11/25/18:  1. Interval right frontal craniotomy for tumor resection.  2. Increasing areas of vasogenic edema in both hemispheres with  associated underlying lesions. The pattern suggests progressive  metastatic disease. Clinical correlation suggested.  3. No evidence for intracranial hemorrhage or midline shift.   - Suspect edema described above is due to XRT, but will discuss with Dr Quijano, Radiation Oncology, to get his opinion.  - Remains on dexamethasone 8 mg BID.      Generalized weakness, risk for falls  Will be alone all day starting this week while family is at work. Family concerned about patient's safety with his weakness.  Brain mets likely affecting patient's balance and overall function.   -  PT/OT evaluations underway.  -  consult, will likely benefit from home health care.            Cough   Chronic cough. Known lung mets. Chest CT on 11/26/18 confirms the present of extensive metastatic disease to both lungs in the form of large bilateral masses.  Cough reported to be mild and non-productive this morning. - low suspicion for pneumonia. Cough managed prior to admission with Tessalon and albuterol without much relief.  - Continue benzonatate and albuterol PRN.          Tachycardia   HR 90 - 118 last 24 hours.  Tachycardia has been mild and persistent since admission.  Cause unclear.  NO evidence of PE on chest CT 11/26/18.   - Observe HR.          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 S/P left nephrectomy in June 2018, S/P brain tumor resection on 11/1/18, [facility]. Follows with oncology Dr. Rodriguez through [facility], but is doing radiation at [facility], which is closer to home. On day of admission (11/23/2018) he received round 6 of 10 of whole brain radiation. Has been prescribed carozantinib for RCC but has not yet started taking it. Receives decadron 4 mg q 6 hours prior to admission for radiation induced inflammation. On Keppra prior to admission.  - Hold prior to admission carozantinib  - Continue prior to admission dexamethasone, though dose changed from 4 mg Q6H to 8 mg Q12H.  - Continue prior to admission Keppra.          Type 2 diabetes mellitus treated without insulin  Hgb A1c 6.7 on admission.  Managed prior to admission with metformin 1000 mg BID, though is not receiving metformin here in-house; can't find documentation as to why as GFR has been OK.  Glucose consistently high, 178 - 281 last 5 checks.  - Resume metformin, initially at 500 mg BID, increase dose if needed.  - Continue high dose sliding scale insulin  - Hyper/hypoglycemia protocol.          Essential hypertension with goal blood pressure less than  130/80  Not on antihypertensive medications prior to admission.  BP OK here on no Rx.  - Continue to observe off of medications.          Anxiety disorder due to general medical condition  Stable mood upon admission. Managed prior to admission with Buspar, continue.    Hypoxemic respiratory failure, acute  O2 needs increased over night 11/25-26, transferred to ICU.  BiPAP used for about an hour, patient had difficulties tolerating it so it has been removed.  Currently on O2 per 10 L mask.  Denies dyspnea or respiratory chest pain, finds the mask to be uncomfortable.  CT chest 11/26/18 showed no PE, extensive bilateral masses unchanged, mild perihilar airspace opacities new from prior study possibly representing pulmonary edema.  I/O's reviewed; had net output 11/25-26 of 3.2 L.  - Given that his respiratory status and O2 needs are stable, will observe without Rx for now.  If hypoxemia worsens, will give another dose furosemide.          Fluids: Saline lock, oral only.  Electrolytes: Monitor  Nutrition: Regular diet with high calorie/high protein supplements, but not eating much.      DVT Prophylaxis: Enoxaparin.  Code Status: Full Code - discussed directly with patient and his family     Dispo:   Will home care after discharge; options being explored.   Wants to change Oncology to down here from [facility] since he lives here now.  Will help arrange prior to discharge.   Anticipate discharge in 3 to 5 additional days.     Attestation:  I have reviewed today's vital signs, notes, medications, labs and imaging.  Amount of time performed on this critical care visit: 50 minutes.    Surinder Rivera MD      Interval History   Denies dyspnea at rest.  Cough is less severe, is non-productive.  No respiratory chest pain.  No current nausea, no recent emesis.  Not eating much, reports early satiety.  No chest pain or orthopnea.    Physical Exam   Temp:  [97.3  F (36.3  C)-98.7  F (37.1  C)] 97.3  F (36.3  C)  Pulse:   [] 112  Heart Rate:  [104-118] 113  Resp:  [7-25] 16  BP: (115-165)/(70-87) 125/87  FiO2 (%):  [50 %] 50 %  SpO2:  [86 %-97 %] 92 %    Weights:   Vitals:    11/24/18 0623 11/25/18 0607 11/26/18 0300   Weight: 79.4 kg (175 lb 0.7 oz) 75.5 kg (166 lb 7.2 oz) 98 kg (216 lb 0.8 oz)    Body mass index is 31.91 kg/(m^2).    GENERAL: Pleasant man, looks fatigued, but in no distress.  EYES: Eyes grossly normal to inspection, extraocular movements intact  HENT: Deferred due to O2 mask.  NECK: Trachea midline, no stridor.    RESP: No accessory muscle use.  Symmetrical breath sounds.  Scattered areas of coarse crackles on inspiration over both sides, loudest left anterior.  Expiration not prolonged, no wheeze.  CV: Regular rate and rhythm, mildly tachycardic.  Normal S1 S2, no murmur or extra sound.  No lower extremity edema.  ABDOMEN: Soft, non-tender, no guarding.  Liver and spleen not palpable, no masses palpable.  Bowel sounds positive.  MS: No bony deformities noted.  No red or inflamed joints.  SKIN: Warm and dry, no rashes.  NEURO: Alert, oriented, conversant.  Cranial nerves II - XII grossly intact.  No gross motor or sensory deficits.    PSYCH: Calm, alert, conversant.  Able to articulate logical thoughts, no tangential thoughts, no hallucinations or delusions.  Affect normal.        Data     Recent Labs  Lab 11/26/18  0455 11/26/18  0045 11/25/18  1659  11/23/18  1801   WBC 10.1 9.5  --   --  11.1*   HGB 14.2 13.9  --   --  15.2   MCV 86 86  --   --  86    182  --   --  192    137 136  --  136   POTASSIUM 3.5 4.0 4.0  --  4.5   CHLORIDE 93* 97 98  --  101   CO2 34* 33* 30  --  24   BUN 25 24 28  --  33*   CR 1.19 1.15 1.08  --  1.10   ANIONGAP 9 7 8  --  11   MED 11.7* 11.1* 10.9*  --  12.5*   * 256* 281*  --  239*   ALBUMIN 2.6* 2.5* 2.3*  < >  --    PROTTOTAL 6.6* 6.2* 5.8*  < >  --    BILITOTAL 0.4 0.4 0.2  < >  --    ALKPHOS 49 47 45  < >  --    ALT 18 18 16  < >  --    AST 16 17 14  < >   --    < > = values in this interval not displayed.      Recent Labs  Lab 11/26/18  0455 11/26/18  0045 11/25/18  2134 11/25/18  1659 11/25/18  1620 11/25/18  1144 11/25/18  0626 11/25/18  0206 11/24/18  2124  11/23/18  1801   * 256*  --  281*  --   --   --   --   --   --  239*   BGM  --   --  191*  --  319* 314* 194* 233* 213*  < >  --    < > = values in this interval not displayed.     Unresulted Labs Ordered in the Past 30 Days of this Admission     Date and Time Order Name Status Description    11/26/2018 0243 Methicillin Resist/Sens S. aureus PCR In process     11/26/2018 0000 Parathyroid Hormone Intact In process     11/26/2018 0000 PTH Related Peptide Test In process            Imaging  Recent Results (from the past 24 hour(s))   CT Head w/o Contrast    Narrative    CT SCAN OF THE HEAD WITHOUT CONTRAST   11/25/2018 5:20 PM     HISTORY: Rule out ICH.    TECHNIQUE:  Axial images of the head and coronal reformations without  IV contrast material.  Radiation dose for this scan was reduced using  automated exposure control, adjustment of the mA and/or kV according  to patient size, or iterative reconstruction technique.    COMPARISON: 10/17/2018    FINDINGS: During the interval, there has been a right frontal  craniotomy for tumor resection. There is some volume loss at the site  of tumor resection. There are also bilateral areas of low density  consistent with vasogenic edema in both hemispheres with other nodular  lesion seen in the left hemisphere measuring approximately 1.2 and 0.9  cm in size. The amounts of vasogenic edema have significantly  progressed since the prior study. Ventricles are not enlarged. There  is no evidence for intracranial hemorrhage or acute infarct.      Impression    IMPRESSION:  1. Interval right frontal craniotomy for tumor resection.  2. Increasing areas of vasogenic edema in both hemispheres with  associated underlying lesions. The pattern suggests progressive  metastatic  disease. Clinical correlation suggested.  3. No evidence for intracranial hemorrhage or midline shift.     JOSIE SKELTON MD   Chest 2 Views*    Narrative    CHEST 2 VIEWS   11/26/2018 1:13 AM     HISTORY: New hypoxia.    COMPARISON: 11/23/2018 - CT chest, abdomen and pelvis.    FINDINGS: Moderate-sized mass-like opacities in bilateral lung bases  in addition to a few smaller patchy nodular opacities scattered within  the upper and mid lungs bilaterally, not convincingly changed since  the comparison CT scan dated 11/23/2018, allowing for differences in  technique. Normal-sized cardiac silhouette. Right anterior chest wall  port with catheter entering the right internal jugular vein and distal  tip in the right atrium.      Impression    IMPRESSION:   1. No convincing interval change since 10/23/2018.  2. A few opacities scattered within both lungs, including dominant  moderate-sized mass-like opacities in bilateral lung bases.    JARAD NICHOLE MD   CT Chest pulmonary embolism w contrast    Narrative    CT CHEST WITH CONTRAST   11/26/2018 2:41 AM     HISTORY: New hypoxia.    COMPARISON: 11/23/2018 - CT chest, abdomen and pelvis.    TECHNIQUE: Following the uneventful administration of 80 mL Isovue-370  intravenous contrast, helical sections were acquired through the lungs  according to the pulmonary embolism protocol. Coronal reconstructions  were generated. Radiation dose for this scan was reduced using  automated exposure control, adjustment of the mA and/or kV according  to the patient's size, or iterative reconstruction technique.    FINDINGS: No visualized pulmonary embolus. The thoracic aorta is  normal in caliber without dissection. Right anterior chest wall port  with catheter entering the right internal jugular vein and distal tip  in the right atrium.    Several mass-like opacities scattered within both lungs, including a  dominant 11 cm opacity in the right lung base, not convincingly  changed since  11/23/2018. Mild groundglass opacities scattered with in  the central aspects of both lungs, new. Tiny left pleural effusion  again noted. No right pleural or pericardial effusion. Several mildly  and moderately enlarged mediastinal and right hilar lymph nodes,  unchanged. For example, there is a 2.9 x 2.8 cm right hilar lymph node  (series 4,  image 69). Patchy lucencies within bilateral humeral heads  again noted.    Scan through the upper abdomen is significant for a complex 11 x 8 cm  fluid collection abutting the posterior wall of the stomach (series 4,  image 134) that has moderately increased in size. Again noted are  moderate diffuse fatty infiltration of the liver, moderate nodularity  of the right adrenal gland, prior left nephrectomy, a 9 cm  heterogeneous mass centered in the left renal fossa and mild  splenomegaly.      Impression    IMPRESSION:   1. Mild groundglass opacities in the central aspects of both lungs  that could represent atelectasis or pulmonary edema.  2. No visualized pulmonary embolus.  3. Extensive metastatic neoplasm in the chest including several  bilateral pulmonary masses, several enlarged thoracic lymph nodes, a  probable right adrenal metastasis, probable bilateral humeral  metastasis, and a large metastasis in the left renal fossa, not  convincingly changed since the recent study dated 11/23/2018.  4. Nonspecific moderate-sized irregular-shaped 11 x 8 cm fluid  collection in the upper left hemiabdomen and abutting the posterior  wall of the stomach, moderately increased in size.    JARAD NICHOLE MD        I reviewed all new labs and imaging results over the last 24 hours. I personally reviewed the chest CT image(s) showing large bilateral masses, no evidence of PE..    Medications       atorvastatin  10 mg Oral At Bedtime     busPIRone  10 mg Oral BID     dexamethasone  8 mg Oral Q12H MONICA     dronabinol  2.5 mg Oral BID     enoxaparin  40 mg Subcutaneous Q24H     furosemide   40 mg Intravenous Q4H     heparin  5 mL Intracatheter Q28 Days     heparin lock flush  5-10 mL Intracatheter Q24H     insulin aspart  1-10 Units Subcutaneous TID AC     insulin aspart  1-7 Units Subcutaneous At Bedtime     levETIRAcetam  500 mg Oral BID     pantoprazole  40 mg Oral QAM AC     senna-docusate  1 tablet Oral BID    Or     senna-docusate  2 tablet Oral BID       Surinder Rivera MD[JM1.1]            Revision History        User Key Date/Time User Provider Type Action    > JM1.1 11/26/2018  7:49 AM Surinder Rivera MD Physician Sign            Progress Notes by Clair Queen RN at 11/26/2018  6:52 AM     Author:  Clair Queen RN Service:  ICU Author Type:  Registered Nurse    Filed:  11/26/2018  6:57 AM Date of Service:  11/26/2018  6:52 AM Creation Time:  11/26/2018  6:52 AM    Status:  Signed :  Clair Queen RN (Registered Nurse)         Patient asking to have oxymask off, which he is on 10L with sats 90-92%.  States he hasn't slept,all though he has appeared to be dozing, and that he can't sleep with oxygen on. Explained to him he will need it on today, which clearly is frustrating to him.  Also request frequent drinks of water.  Unable to coordinate the straw through to his mouth through the large open areas in the oxymask even with guidance.[KS1.1]       Revision History        User Key Date/Time User Provider Type Action    > KS1.1 11/26/2018  6:57 AM Clair Queen RN Registered Nurse Sign            Progress Notes by Clair Queen RN at 11/26/2018  6:15 AM     Author:  Clair Queen RN Service:  ICU Author Type:  Registered Nurse    Filed:  11/26/2018  6:17 AM Date of Service:  11/26/2018  6:15 AM Creation Time:  11/26/2018  6:15 AM    Status:  Signed :  Clair Queen RN (Registered Nurse)         Patient wore BIPAP for about 2 hours.  Did remove it about every 30 minutes to drink water.  Patient then requested it be taken off at 0600 as he couldn't tolerate it  and wanted a break. Remains tach 110-120's.  Reported abdominal fullness.  No other complaints besides poor sleep x2 days.[KS1.1]       Revision History        User Key Date/Time User Provider Type Action    > KS1.1 11/26/2018  6:17 AM Clair Queen, RN Registered Nurse Sign            Progress Notes by Erin Larson RN at 11/26/2018  3:56 AM     Author:  Erin Larson RN Service:  (none) Author Type:  Registered Nurse    Filed:  11/26/2018  4:12 AM Date of Service:  11/26/2018  3:56 AM Creation Time:  11/26/2018  3:56 AM    Status:  Signed :  Erin Larson RN (Registered Nurse)         At start of shift found pt sitting bedside with urinal, pt stated feeling very frustrated w/frequent urination because he was tired and wanting to sleep. Running  ml/hr. Pt wanting care clustered. Other staff aware of being available to help pt w/sitting bedside to use urinal as pt is fatigued and unsteady in sitting position. At 2340  pt bedside o2 85%, continued check laying back in bed on back 86%, applied NC at 2 lpm and titrated up, put on oxymask 8 lpm, RT called, neb given, lasix given, hardin inserted. Pt continued to drop sats and decision was made to transfer to ICU.[SS1.1]      Revision History        User Key Date/Time User Provider Type Action    > SS1.1 11/26/2018  4:12 AM Erin Larson RN Registered Nurse Sign            Progress Notes by Clair Queen RN at 11/26/2018  4:06 AM     Author:  Clair Queen RN Service:  ICU Author Type:  Registered Nurse    Filed:  11/26/2018  4:09 AM Date of Service:  11/26/2018  4:06 AM Creation Time:  11/26/2018  4:06 AM    Status:  Signed :  Clair Queen RN (Registered Nurse)         Patient is now on BIPAP and seems to be tolerating it better.  Does complain of having a dry mouth.  Patient given water, and lip moisture applied.   Also  given ativan for anxiety.[KS1.1]       Revision History        User Key Date/Time User Provider  Type Action    > KS1.1 11/26/2018  4:09 AM Clair Queen RN Registered Nurse Sign            Progress Notes by Clair Queen RN at 11/26/2018  3:38 AM     Author:  Clair Queen RN Service:  ICU Author Type:  Registered Nurse    Filed:  11/26/2018  3:40 AM Date of Service:  11/26/2018  3:38 AM Creation Time:  11/26/2018  3:38 AM    Status:  Signed :  Clair Queen RN (Registered Nurse)         Patient did not tolerate High flow oxygen very well.  He was only on it for less than 30 minutes.  It made him feel more short of air and like he couldn't breath out.  Did improve his oxygen saturations, but unable to tolerate.  RT to attempt BIPAP with patient.  Spoke with MD.  Will also give lasix and ativan.[KS1.1]      Revision History        User Key Date/Time User Provider Type Action    > KS1.1 11/26/2018  3:40 AM Clair Queen RN Registered Nurse Sign            Progress Notes by Pam Peñaloza RN at 11/24/2018  1:21 AM     Author:  Pam Peñaloza RN Service:  (none) Author Type:  Registered Nurse    Filed:  11/26/2018  2:47 AM Date of Service:  11/24/2018  1:21 AM Creation Time:  11/24/2018  1:21 AM    Status:  Addendum :  Pam Peñaloza RN (Registered Nurse)         Port was accessed, able to flush but no blood return.  Pt reports he did not have blood return  With prior access either.  Spoke with SHONA Rodriguez.  Cath homa ordered and instilled for 60 minutes, po[SR1.1]R[SR1.2]t now has good blood return and flushes well.  IVFs changed to PORT access.[SR1.1] Pt reports that he has had CT contrast thru is port in the past.[SR1.3] RN updated.[SR1.1]     Revision History        User Key Date/Time User Provider Type Action    > SR1.3 11/26/2018  2:47 AM Pam Peñaloza RN Registered Nurse Addend     SR1.2 11/24/2018  6:26 AM Pam Peñaloza RN Registered Nurse Addend     SR1.1 11/24/2018  1:23 AM Pam Peñaloza, RN Registered Nurse Sign            Progress Notes by Erin Larson RN at 11/26/2018  2:15  AM     Author:  Erin Larson RN Service:  (none) Author Type:  Registered Nurse    Filed:  11/26/2018  2:23 AM Date of Service:  11/26/2018  2:15 AM Creation Time:  11/26/2018  2:15 AM    Status:  Signed :  Erin Larson RN (Registered Nurse)         [facility] TRANSPORT NOTE  Data:   Reason for Transport:  Hypoxia     Raphael Marin was transported to CT then ICU room 3 via wheel chair at 0222.  Patient was accompanied by Nursing Assistant. Equipment used for transport: Oxygen  High Flow Mask. Family was aware of reason for transport: yes spoke w/mother Hopkins    Action:  Report: given to Clair POLANCO RN    Response:  Patient's condition when transferred off unit was stable.    Erin Larson[SS1.1]     Revision History        User Key Date/Time User Provider Type Action    > SS1.1 11/26/2018  2:23 AM Erin Larson RN Registered Nurse Sign            Progress Notes by Archie Winters PA-C at 11/26/2018 12:33 AM     Author:  Archie Winters PA-C Service:  Hospitalist Author Type:  Physician Assistant - C    Filed:  11/26/2018  1:54 AM Date of Service:  11/26/2018 12:33 AM Creation Time:  11/26/2018 12:33 AM    Status:  Signed :  Archie Winters PA-C (Physician Assistant - C)         [facility]   Cross Cover Note  Date of Service: 11/26/2018     I was called due to hypoxia. He had been maintaining 92% on room air. During a routine vitals check at 11:42pm he would found to be in the 80s on room air, 86% on 2LPM, and required 6 LPM to get above 90%. Now maintaining 94% with 8LPM via oxymask.    Has known lung mets from renal cell carcinoma. CT chest on admission shows significantly increased tumor burden.    He denies chest pain or dyspnea, but acknowledges feeling weak and tired.     Tachycardic at 112, baseline has been ~90. Crackles in right base. Not febrile. Not exhibiting increased work of breathing. No calf pain or swelling.  No lower extremity edema.     Stat labs/imaging:  VB.48/49/34/36  BMP: unchanged  BNP: 83  CBC: normal    CXR:   IMPRESSION:   1. No convincing interval change since 10/23/2018.  2. A few opacities scattered within both lungs, including dominant  moderate-sized mass-like opacities in bilateral lung bases.    Already has orders for PRN albuterol.    After discussing the case with Dr. Canales, ordered CTA PE study and transfer to ICU for bipap.    Additionally, RN requesting Puentes catheter as he is getting Lasix q6h but is weak and tired making it difficult for him to make it to the bathroom frequently. Ordered Puentes.    Archie Winters PA-C on 2018 at 12:33 AM[BB1.1]       Revision History        User Key Date/Time User Provider Type Action    > BB1.1 2018  1:54 AM Archie Winters PA-C Physician Assistant - C Sign                  Procedure Notes     No notes of this type exist for this encounter.      Progress Notes - Therapies (Notes from 18 through 18)     No notes of this type exist for this encounter.

## 2018-11-23 NOTE — IP AVS SNAPSHOT
"` `           [facility] INTENSIVE CARE: [phone]                                              INTERAGENCY TRANSFER FORM - NURSING   2018                    Hospital Admission Date: 2018  ALCIRA POLO   : 1980  Sex: Male        Attending Provider: Surinder Rivera MD     Allergies:  No Known Allergies    Infection:  None   Service:  HOSPITALIST    Ht:  1.753 m (5' 9\")   Wt:  96 kg (211 lb 10.3 oz)   Admission Wt:  79 kg (174 lb 2.6 oz)    BMI:  31.25 kg/m 2   BSA:  2.16 m 2            Patient PCP Information     Provider PCP Type    Lul Guerrero MD General      Current Code Status     Date Active Code Status Order ID Comments User Context       Prior      Code Status History     Date Active Date Inactive Code Status Order ID Comments User Context    2018  9:22 AM  DNR/DNI [id number]  Surinder Rivera MD Outpatient    2018 12:54 PM 2018  9:22 AM DNR/DNI [id number]  Surinder Rivera MD Inpatient    2018  5:00 PM 2018 12:54 PM Full Code [id number]  Joellen Holcomb, PRADEEP Inpatient      Advance Directives        Scanned docmt in ACP Activity?           No scanned doc        Hospital Problems as of 2018              Priority Class Noted POA    Essential hypertension with goal blood pressure less than 130/80 Medium  2014 Yes    Type 2 diabetes mellitus treated without insulin (H) Medium  2018 Yes    Anxiety disorder due to general medical condition Medium  7/10/2018 Yes    Primary clear cell carcinoma of kidney, left (H) Medium  2018 Yes    Abdominal pain, generalized Medium  2018 Unknown    Medical cannabis--Certified eligible on 18 Medium  2018 No    Hypercalcemia Medium  2018 Yes    * (Principal)Declining functional status Medium  2018 Yes      Non-Hospital Problems as of 2018     None      Immunizations     None         END      ASSESSMENT     Discharge Profile Flowsheet     " EXPECTED DISCHARGE     GI WDL  ex 11/29/18 0856    Expected Discharge Date  11/29/18 11/28/18 2007   All Quadrants Palpation  soft/nontender 11/29/18 0550    DISCHARGE NEEDS ASSESSMENT     Last Bowel Movement  11/27/18 11/29/18 0550    Concerns To Be Addressed  all concerns addressed in this encounter 11/23/18 1750   GI Signs/Symptoms  constipation 11/29/18 0856    Patient/family verbalizes understanding of discharge plan recommendations?  Yes 11/28/18 2007   Passing flatus  yes 11/29/18 0550    Medical Team notified of plan?  yes 11/28/18 2007   COMMUNICATION ASSESSMENT      Anticipated Changes Related to Illness  none 11/23/18 1750   Patient's communication style  spoken language (English or Bilingual) 11/23/18 1755    Equipment Currently Used at Home  none 11/24/18 1056   FINAL RESOURCES      Transportation Available  ambulance 11/28/18 2007   Resources List  Home Care;PCA 11/24/18 1055    Primary Care Clinic Name  [facility] 11/24/18 0731   SKIN      Primary Care MD Name  Lul Guerrero MD 11/24/18 0731   Inspection of bony prominences  Full 11/29/18 0856    FUNCTIONAL LEVEL CURRENT     Full except areas not inspected   Buttock, left;Buttock, right;Hip, left;Hip, right;Spine;Sacrum;Coccyx 11/26/18 0350    Ambulation  2 - assistive person 11/28/18 1602   Inspection under devices  Full 11/28/18 0425    Transferring  2 - assistive person 11/28/18 1602   Skin WDL  ex 11/29/18 0856    Toileting  0 - independent 11/28/18 1602   Skin Temperature  warm 11/29/18 0550    Bathing  0 - independent 11/28/18 1602   Skin Moisture  diaphoretic 11/28/18 1602    Dressing  0 - independent 11/28/18 1602   Skin Elasticity  quick return to original state 11/28/18 1602    Eating  0 - independent 11/28/18 1602   Skin Integrity  bruise(s);scar(s) 11/29/18 0856    Communication  0 - understands/communicates without difficulty 11/28/18 1602   SAFETY      Swallowing  0 - swallows foods/liquids without difficulty  "11/23/18 1746   Safety WDL  WDL 11/29/18 0856    Change in Functional Status Since Onset of Current Illness/Injury  yes (changed in the last few days) 11/23/18 1746   Safety Factors  call light in reach;wheels locked;patient up in chair 11/29/18 0856    GASTROINTESTINAL (ADULT,PEDIATRIC,OB)     All Alarms  none present 11/29/18 0856                 Assessment WDL (Within Defined Limits) Definitions           Safety WDL     Effective: 09/28/15    Row Information: <b>WDL Definition:</b> Bed in low position, wheels locked; call light in reach; upper side rails up x 2; ID band on<br> <font color=\"gray\"><i>Item=AS safety wdl>>List=AS safety wdl>>Version=F14</i></font>      Skin WDL     Effective: 09/28/15    Row Information: <b>WDL Definition:</b> Warm; dry; intact; elastic; without discoloration; pressure points without redness<br> <font color=\"gray\"><i>Item=AS skin wdl>>List=AS skin wdl>>Version=F14</i></font>      Vitals     Vital Signs Flowsheet     QUICK ADDS     Sleep  awake with occasional pain 11/29/18 0812    Quick Adds  Orthostatic BP 11/23/18 2302   ANALGESIA SIDE EFFECTS MONITORING      VITAL SIGNS     Side Effects Monitoring: Respiratory Quality  R 11/29/18 0849    Temp  98.5  F (36.9  C) 11/29/18 0812   Side Effects Monitoring: Respiratory Depth  N 11/29/18 0849    Temp src  Oral 11/29/18 0812   Side Effects Monitoring: Sedation Level  1 11/29/18 0849    Resp  24 11/29/18 0812   MONICA COMA SCALE      Pulse  112 11/25/18 2352   Best Eye Response  4-->(E4) spontaneous 11/28/18 1553    Heart Rate  97 11/29/18 0812   Best Motor Response  6-->(M6) obeys commands 11/28/18 1553    Pulse/Heart Rate Source  Monitor 11/26/18 1548   Best Verbal Response  5-->(V5) oriented 11/28/18 1553    BP  121/69 11/29/18 0812   Monica Coma Scale Score  15 11/28/18 1553    BP Location  Right arm 11/29/18 0812   HEIGHT AND WEIGHT      SITTING ORTHOSTATIC BP     Height  1.753 m (5' 9\") 11/26/18 0310    Sitting Orthostatic BP  " 152/94 11/23/18 2305   Height Method  Stated 11/26/18 0310    Sitting Orthostatic Pulse  99 bpm 11/23/18 2305   Weight  96 kg (211 lb 10.3 oz) 11/28/18 0624    STANDING ORTHOSTATIC BP     Weight Method  Bed scale 11/28/18 0624    Standing Orthostatic BP  133/85 11/23/18 2305   BSA (Calculated - sq m)  2.18 11/26/18 0310    Standing Orthostatic Pulse  126 bpm 11/23/18 2305   BMI (Calculated)  31.97 11/26/18 0310    OXYGEN THERAPY     POSITIONING      SpO2  93 % 11/29/18 0812   Body Position  supine;supine, legs elevated;supine, head elevated 11/29/18 0538    O2 Device  Nasal cannula 11/29/18 0812   Head of Bed (HOB)  HOB at 30-45 degrees 11/29/18 0538    FiO2 (%)  50 % 11/26/18 0613   Positioning/Transfer Devices  pillows;in use 11/29/18 0538    Oxygen Delivery  5 LPM 11/29/18 0812   Chair  Upright in chair 11/27/18 1839    PAIN/COMFORT     DAILY CARE      Patient Currently in Pain  denies 11/29/18 0812   Activity Management  up in chair 11/29/18 0826    Preferred Pain Scale  CAPA (Clinically Aligned Pain Assessment) ([facility] Adults Only) 11/28/18 1404   Activity Assistance Provided  assistance, 2 people 11/29/18 0958    0-10 Pain Scale  7 11/28/18 1202   ECG      Pain Location  Back 11/28/18 1950   ECG Rhythm  Sinus rhythm 11/28/18 0519    Pain Orientation  Anterior;Mid 11/27/18 0707   Ectopy  None 11/28/18 0519    Pain Descriptors  Aching 11/28/18 1404   NH Interval  .12 11/28/18 0519    Pain Intervention(s)  Medication (See eMAR);Heat applied;Repositioned 11/29/18 0540   QRS Interval  .09 11/28/18 0519    Response to Interventions  Decrease in pain 11/29/18 0540   QT Interval  .32 11/28/18 0519    CLINICALLY ALIGNED PAIN ASSESSMENT (CAPA) ([facility] ADULTS ONLY)     Lead Monitored  Lead II 11/28/18 0519    Comfort  comfortably manageable 11/29/18 0812   POINT OF CARE TESTING      Change in Pain  getting better 11/29/18 0812   Puncture Site  fingertip 11/28/18 0652    Pain Control   fully effective 11/29/18 0812   Bedside Glucose (mg/dl )   277 mg/dl 11/28/18 0652    Functioning  can do most things, but pain gets in the way of some 11/29/18 0812                 Patient Lines/Drains/Airways Status    Active LINES/DRAINS/AIRWAYS     Name: Placement date: Placement time: Site: Days: Last dressing change:    Urethral Catheter Straight-tip 18 fr 11/26/18   0149   Straight-tip   3     Rash 11/23/18 forehead  11/23/18       6             Patient Lines/Drains/Airways Status    Active PICC/CVC     Name: Placement date: Placement time: Site: Days: Additional Info Last dressing change:    Port A Cath Single 06/14/18 Right Chest wall 06/14/18   1000   Chest wall   168 Orientation: Right            Power Port: Yes            Line Flushed (See MAR): Yes            Catheter Tip Intact:                Intake/Output Detail Report     Date Intake     Output Net    Shift P.O. I.V. IV Piggyback Total Urine Total       Noc 11/27/18 2300 - 11/28/18 0659 -- 240 -- 240 625 625 -385    Day 11/28/18 0700 - 11/28/18 1459 360 -- -- 360 900 900 -540    Regina 11/28/18 1500 - 11/28/18 2259 -- -- -- -- 250 250 -250    Noc 11/28/18 2300 - 11/29/18 0659 -- -- -- -- 450 450 -450    Day 11/29/18 0700 - 11/29/18 1459 -- -- -- -- -- -- 0      Last Void/BM       Most Recent Value    Urine Occurrence 1 at 11/25/2018 1257    Stool Occurrence       Case Management/Discharge Planning     Case Management/Discharge Planning Flowsheet     REFERRAL INFORMATION     ASSESSMENT/CONCERNS TO BE ADDRESSED      Did the Initial Social Work Assessment result in a Social Work Case?  No 11/24/18 1055   Concerns To Be Addressed  all concerns addressed in this encounter 11/23/18 1750    Reason For Consult  other (see comments) (Home care referral, PCA with the [facility]) 11/24/18 1055   DISCHARGE PLANNING      Primary Care Clinic Name  [facility] 11/24/18 0731   Patient/family verbalizes understanding of discharge plan recommendations?   Yes 11/28/18 2007    Primary Care MD Name  Lul Guerrero MD 11/24/18 0731   Medical Team notified of plan?  yes 11/28/18 2007    LIVING ENVIRONMENT     Anticipated Changes Related to Illness  none 11/23/18 1750    Lives With  parent(s) (mom and step dad) 11/24/18 1047   Transportation Available  ambulance 11/28/18 2007    Living Arrangements  house 11/24/18 1047   FINAL RESOURCES      Able to Return to Prior Living Arrangements  yes 11/24/18 1055   Equipment Currently Used at Home  none 11/24/18 1056    ASSESSMENT OF FAMILY/SOCIAL SUPPORT     Resources List  Home Care;PCA 11/24/18 1055    Who is your support system?  Parent(s) 11/24/18 1055   ABUSE RISK SCREEN      Description of Support System  Supportive;Involved 11/24/18 1055   QUESTION TO PATIENT:  Has a member of your family or a partner(now or in the past) intimidated, hurt, manipulated, or controlled you in any way?  no 11/23/18 1747    COPING/STRESS     QUESTION TO PATIENT: Do you feel safe going back to the place where you are living?  yes 11/23/18 1747    Major Change/Loss/Stressor  illness 11/23/18 1755   OBSERVATION: Is there reason to believe there has been maltreatment of a vulnerable adult (ie. Physical/Sexual/Emotional abuse, self neglect, lack of adequate food, shelter, medical care, or financial exploitation)?  no 11/23/18 1747    EXPECTED DISCHARGE     OTHER      Expected Discharge Date  11/29/18 11/28/18 2007   Are you depressed or being treated for depression?  Yes (Currently sees therapist and takes buspar) 11/23/18 1746

## 2018-11-23 NOTE — IP AVS SNAPSHOT
"[facility] INTENSIVE CARE: [phone]                                              INTERAGENCY TRANSFER FORM - PHYSICIAN ORDERS   2018                    Hospital Admission Date: 2018  ALCIRA POLO   : 1980  Sex: Male        Attending Provider: Surinder Rivera MD     Allergies:  No Known Allergies    Infection:  None   Service:  HOSPITALIST    Ht:  1.753 m (5' 9\")   Wt:  96 kg (211 lb 10.3 oz)   Admission Wt:  79 kg (174 lb 2.6 oz)    BMI:  31.25 kg/m 2   BSA:  2.16 m 2            Patient PCP Information     Provider PCP Type    Lul Guerrero MD General      ED Clinical Impression     Diagnosis Description Comment Added By Time Added    Anorexia [R63.0] Anorexia [R63.0]  Belgica Guerrero, LISSETTE CNP 2018 10:43 AM    Primary clear cell carcinoma of kidney, left (H) [C64.2] Primary clear cell carcinoma of kidney, left (H) [C64.2]  Belgica Guerrero, ILSSETTE CNP 2018 11:49 AM    Nausea and vomiting, intractability of vomiting not specified, unspecified vomiting type [R11.2] Nausea and vomiting, intractability of vomiting not specified, unspecified vomiting type [R11.2]  Belgica Guerrero, APRN CNP 2018 11:53 AM    Slow transit constipation [K59.01] Slow transit constipation [K59.01]  Belgica Guerrero, APRN CNP 2018 11:54 AM    Brain metastasis (H) [C79.31] Brain metastasis (H) [C79.31]  Belgica Guerrero, LISSETTE CNP 2018 12:00 PM    Neoplastic (malignant) related fatigue [R53.0] Neoplastic (malignant) related fatigue [R53.0]  Belgica Guerrero, APRN CNP 2018 10:45 AM      Hospital Problems as of 2018              Priority Class Noted POA    Essential hypertension with goal blood pressure less than 130/80 Medium  2014 Yes    Type 2 diabetes mellitus treated without insulin (H) Medium  2018 Yes    Anxiety disorder due to general medical condition Medium  7/10/2018 Yes    Primary clear cell carcinoma of kidney, left (H) Medium  2018 Yes "    Abdominal pain, generalized Medium  11/23/2018 Unknown    Medical cannabis--Certified eligible on 11/25/18 Medium  11/25/2018 No    Hypercalcemia Medium  11/25/2018 Yes    * (Principal)Declining functional status Medium  11/27/2018 Yes      Non-Hospital Problems as of 11/29/2018     None      Code Status History     Date Active Date Inactive Code Status Order ID Comments User Context    11/29/2018  9:22 AM  DNR/DNI [id number]  Surinder Rivera MD Outpatient    11/28/2018 12:54 PM 11/29/2018  9:22 AM DNR/DNI [id number]  Surinder Rivera MD Inpatient    11/23/2018  5:00 PM 11/28/2018 12:54 PM Full Code [id number]  Joellen Holcomb PA-C Inpatient         Medication Review      START taking        Dose / Directions Comments    bisacodyl 5 MG EC tablet   Used for:  Slow transit constipation        Dose:  10 mg   Take 2 tablets (10 mg) by mouth daily as needed (constipation not controlled by Senna and Miralax)   Quantity:  60 tablet   Refills:  3        dronabinol 5 MG capsule   Commonly known as:  MARINOL   Used for:  Nausea and vomiting, intractability of vomiting not specified, unspecified vomiting type, Anorexia        Dose:  5 mg   Take 1 capsule (5 mg) by mouth 2 times daily (before meals) May increase to 3x/day before meals if tolerated   Quantity:  90 capsule   Refills:  1        melatonin 1 MG Tabs tablet        Dose:  1 mg   Take 1 tablet (1 mg) by mouth nightly as needed for sleep   Quantity:  60 tablet   Refills:  1        methylphenidate 5 MG tablet   Commonly known as:  RITALIN   Used for:  Neoplastic (malignant) related fatigue        Dose:  5 mg   Take 1 tablet (5 mg) by mouth 2 times daily   Quantity:  10 tablet   Refills:  0        mirtazapine 15 MG tablet   Commonly known as:  REMERON   Used for:  Anorexia        Dose:  15 mg   Take 1 tablet (15 mg) by mouth At Bedtime   Quantity:  30 tablet   Refills:  3        ondansetron 4 MG ODT tab   Commonly known as:  ZOFRAN-ODT   Used for:  Nausea  and vomiting, intractability of vomiting not specified, unspecified vomiting type        Dose:  4 mg   Take 1 tablet (4 mg) by mouth every 6 hours as needed for nausea or vomiting   Quantity:  120 tablet   Refills:  3        prochlorperazine 10 MG tablet   Commonly known as:  COMPAZINE   Used for:  Nausea and vomiting, intractability of vomiting not specified, unspecified vomiting type        Dose:  10 mg   Take 1 tablet (10 mg) by mouth every 6 hours as needed (nausea/vomiting not controlled by Ondansetron (OK to take both))   Quantity:  90 tablet   Refills:  3        senna-docusate 8.6-50 MG tablet   Commonly known as:  SENOKOT-S/PERICOLACE   Used for:  Slow transit constipation        Dose:  2 tablet   Take 2 tablets by mouth At Bedtime May increase to 2 tabs twice daily if necessary.   Quantity:  100 tablet   Refills:  3          CONTINUE these medications which may have CHANGED, or have new prescriptions. If we are uncertain of the size of tablets/capsules you have at home, strength may be listed as something that might have changed.        Dose / Directions Comments    dexamethasone 4 MG tablet   Commonly known as:  DECADRON   This may have changed:    - how much to take  - when to take this  - additional instructions   Used for:  Brain metastasis (H)        Dose:  8 mg   Take 2 tablets (8 mg) by mouth 2 times daily (with meals) Do not stop suddenly; drug must be tapered down.   Quantity:  120 tablet   Refills:  1        polyethylene glycol powder   Commonly known as:  MIRALAX/GLYCOLAX   This may have changed:  additional instructions   Used for:  Slow transit constipation        Dose:  17 g   Take 17 g by mouth daily May increase dose to twice daily if necessary.   Quantity:  500 g   Refills:  3          CONTINUE these medications which have NOT CHANGED        Dose / Directions Comments    albuterol 108 (90 Base) MCG/ACT inhaler   Commonly known as:  PROAIR HFA/PROVENTIL HFA/VENTOLIN HFA        Dose:  2 puff    Inhale 2 puffs into the lungs every 4 hours as needed for shortness of breath / dyspnea or wheezing   Refills:  0        benzonatate 200 MG capsule   Commonly known as:  TESSALON        Dose:  200 mg   Take 200 mg by mouth 3 times daily as needed for cough   Refills:  0        busPIRone 10 MG tablet   Commonly known as:  BUSPAR        Dose:  10 mg   Take 10 mg by mouth 2 times daily   Refills:  0        HYDROcodone-acetaminophen 5-325 MG tablet   Commonly known as:  NORCO        Dose:  1-2 tablet   Take 1-2 tablets by mouth every 4 hours as needed for moderate to severe pain   Refills:  0        levETIRAcetam 500 MG tablet   Commonly known as:  KEPPRA        Dose:  500 mg   Take 500 mg by mouth 2 times daily   Refills:  0        lidocaine-prilocaine 2.5-2.5 % external cream   Commonly known as:  EMLA        Apply topically as needed for moderate pain Apply quarter size amount to port site 30 to 60 minutes prior to access.   Refills:  0        metFORMIN 500 MG 24 hr tablet   Commonly known as:  GLUCOPHAGE-XR        Dose:  1000 mg   Take 1,000 mg by mouth 2 times daily (before meals)   Refills:  0        order for DME   Used for:  Brain metastasis (H), Metastasis to lung (H)        Equipment being ordered: Hospital Bed Requesting hospital bed for living room on main floor. 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 Dizziness, fall risk, fatigue/malaise, difficulty navigating stairs due to brain mets. Also CERON/SOB affects mobility around home especially up and down stairs.   Quantity:  1 each   Refills:  0        pantoprazole 40 MG EC tablet   Commonly known as:  PROTONIX        Dose:  40 mg   Take 40 mg by mouth daily   Refills:  0          STOP taking     atorvastatin 10 MG tablet   Commonly known as:  LIPITOR           CABOMETYX 60 MG   Generic drug:  Cabozantinib S-Malate           docusate sodium 100 MG capsule   Commonly known as:  COLACE                    "  Further instructions from your care team       From Shane Guerrero, Palliative Care NP, Cell [phone]     I sent prescriptions to [facility] on Sunday, expecting him to return home then.  So please plan to visit both pharmacies to  meds before Raphael goes home.  Thanks.     Medical Cannabis    Here is the main web site for information on medical cannabis:  [email or web address]     The web site notes that it may take UP TO 30 DAYS for Raphael to hear from the [place] Department of Health notifying him that his registration has been accepted, and to advise of next steps.  Please spend a LOT of time reviewing information for patients on this web site.  It will explain the precautions, locations of dispensaries, etc.  If you investigate the links to particular dispensaries, you will find general information on costs.    Only time and experimentation will tell if and how much this helps Raphael's appetite; the pharmacists who work at the dispensary can also give you information on what they've learned from other patients treated for the same symptom.     11/28: Don't feel like you have to get this.  It's expensive.  I believe one of my patients told me they were told it can take 6 weeks or so before it will start working.  Use the Marinol/dronabinol instead.  Furthermore, it looks like Mirtazapine was also sent to [facility], and it might help Raphael's appetite.      Marinol    This is prescription marijuana-like medicine for appetite that appears to be covered by your insurance.  See \"Appetite\" below.     Ritalin = Methylphenidate    This med helps keep you awake.  Take it first thing in the morning and again at 2pm or before.  Don't take it after 2 pm as it can keep you awake at night.    Constipation    To prevent constipation, take 1 cap of Miralax in fluid daily PLUS 2 Senna tabs at bedtime.      Stop the docusate; there is no proof that it works.    TUMS " "can also be constipating and may not work as well as other meds for heartburn. Your hospice RN can guide you if you want to try something instead of TUMS.    If the above doses of Miralax and Senna don't result in a BM at least every other day, you may increase Miralax to twice daily and Senna up to 4 tabs twice daily.  You can adjust these on your own.  Aim for a standard dose that you take every single day.    If you need an extra boost, Raphael may take 2-3 Bisacodyl 5 mg tabs daily; they usually work within about 4 hours.  Take the Bisacodyl ONLY as NEEDED, not daily.    Nausea    I sent in two different prescriptions for anti-nausea meds:  Ondansetron and Prochlorperazine.  They work in different ways and sometimes people need to take both.  While Raphael was here, he was given Ondansetron.  If that worked here, it should work at home, so start with that drug.  If it doesn't relieve nausea within 1 hour, take the other drug.      Keep track (record the date, time, drug and dose) of each anti-nausea med that Raphael takes on a day by day basis.      Appetite    Dronabinol = Marinol, two different names for the same medicine.  Raphael will be discharged on the 5 mg dose.  Take it twice daily before meals.  It can cause euphoria.  If insurance paid for it, you may try increasing the dose to 3x/day before meals.  Cut back to twice a day if (1) Raphael has mental status changes or (2) if it doesn't work.  This is a controlled drug and requires a \"hard copy\" prescription with a provider's original signature.  This is synthetic marijuana/THC.      Pain    Poorly controlled pain can deplete one's energy.  I hope Raphael's constipation is sufficiently controlled that he'll be willing to take Norco for pain when he needs it. Also keep track of each dose of Norco taken--date, time, dose.  Hospice can use this information to determine if or when he might benefit from a long-acting opioid. If he gets to taking 6 tabs over a 24-hour " period for a couple of consecutive days, it means he would probably benefit from a long-acting opioid.     MISC:    Dexamethasone dose is now 8mg (two 4mg tabs) twice daily.  Take it with breakfast and supper.    The Pantoprazole/Protonix should be taken daily on an empty stomach first thing in the morning.  It turns off the stomach's acid pump, which is put into hyperdrive by the Dexamethasone.  Some people still get stomach aches though--from the Dexamethasone.  So just keep your hospice RN informed.    Metformin dose, here, was reduced to just one 500 mg tab twice a day.  I believe your baseline dose was 1000 mg twice daily.  If you don't mind checking your blood sugar a few times, you and your hospice RN can probably figure out which dose you're going to need.  If you don't want to check your blood sugars, stick with the 500 mg dose twice daily as it's safer to run a HIGH blood sugar than a LOW blood sugar in a circumstance like this.     I stopped the Lipitor.  Raphael's not eating enough to have ANY problems with cholesterol right now.  I'm sure you all wish he were!    There's one prescription I did not write for which Raphael was given once or twice the whole time he was here--Lorazepam.  It's a tranquilizer like Buspar.  Until hospice delivers that medicine, if he feels anxious, just give him an extra dose of Buspar.  I'm pretty sure hospice will be delivering some Lorazepam to the home.     Please call me if you have other questions.      Best wishes to you all!    Summary of Visit     Reason for your hospital stay       You came in with generalized weakness and having a difficult time accomplishing activities of daily living on your own due to your underlying cancer, as well as with low oxygen levels and respiratory problems.  You have been able to regain some strength, your breathing and oxygen are better, and you have no established care with the [facility] team, who will be providing care for you in  your mom's home starting today.  You're now ready for discharge.             After Care     Activity       Your activity upon discharge: activity as tolerated.       Diet       Follow this diet upon discharge: Orders Placed This Encounter      Snacks/Supplements Adult: Boost Plus; Between Meals      Regular Diet Adult             Procedures     Oxygen Adult       [facility] Oxygen Order 5 liter(s) by nasal cannula continuously with use of portable tank. Expected treatment length is indefinite (99 months).. Test on conserving device as applicable.    Patients who qualify for home O2 coverage under the CMS guidelines require ABG tests or O2 sat readings obtained closest to, but no earlier than 2 days prior to the discharge, as evidence of the need for home oxygen therapy. Testing must be performed while patient is in the chronic stable state. See notes for O2 sats.    I certify that this patient, Raphael Marin has been under my care and that I, or a nurse practitioner or physician's assistant working with me, had a face-to-face encounter that meets the face-to-face encounter requirements with this patient on 11/29/2018. The patient, Raphael Marin was evaluated or treated in whole, or in part, for the following medical condition, which necessitates the use of the ordered oxygen. Treatment Diagnosis: Metastatic lung disease.    Attending Provider: Surinder Rivera  Physician signature: See electronic signature associated with these discharge orders  Date of Order: November 29, 2018             Referrals     Home Care Referral       Your provider has referred you to: [facility]: [facility] [phone]   [email or web address]    Extended Emergency Contact Information  Primary Emergency Contact: CHARMAINE RETANA  Address: [address] [place] [address]  Mobile Phone: [phone]  Relation: Mother  Secondary Emergency Contact: Charles Retana  (jacobo)   [place]  Mobile Phone: [phone]  Relation: None    Patient Anticipated Discharge Date: 11/26/2018   RN, PT, HHA to begin 24 - 48 hours after discharge.  PLEASE EVALUATE AND TREAT (Evaluation timeline is 24 - 48 hrs. Please call if there is need for a variance to this timeline).    REASON FOR REFERRAL: Palliative Care Home Care Diagnosis:  Mets, Primary clear cell carcinoma of kidney, left stage iv, weakness and anorexia    ADDITIONAL SERVICES NEEDED: PT    OTHER PERTINENT INFORMATION: Patient was last seen by provider on 11/25/2018 for Mets, Primary clear cell carcinoma of kidney, left stage iv, weakness and anorexia.    Current Outpatient Prescriptions:  bisacodyl (DULCOLAX) 5 MG EC tablet, Take 2 tablets (10 mg) by mouth daily as needed (constipation not controlled by Senna and Miralax), Disp: 60 tablet, Rfl: 3  dexamethasone (DECADRON) 4 MG tablet, Take 2 tablets (8 mg) by mouth 2 times daily (with meals) Do not stop suddenly; drug must be tapered down., Disp: 120 tablet, Rfl: 1  dronabinol (MARINOL) 2.5 MG capsule, Take 1 capsule (2.5 mg) by mouth 2 times daily, Disp: 60 capsule, Rfl: 0  melatonin 1 MG TABS tablet, Take 1 tablet (1 mg) by mouth nightly as needed for sleep, Disp: 60 tablet, Rfl: 1  mirtazapine (REMERON) 15 MG tablet, Take 1 tablet (15 mg) by mouth At Bedtime, Disp: 30 tablet, Rfl: 3  ondansetron (ZOFRAN-ODT) 4 MG ODT tab, Take 1 tablet (4 mg) by mouth every 6 hours as needed for nausea or vomiting, Disp: 120 tablet, Rfl: 3  polyethylene glycol (MIRALAX/GLYCOLAX) powder, Take 17 g by mouth daily May increase dose to twice daily if necessary., Disp: 500 g, Rfl: 3  prochlorperazine (COMPAZINE) 10 MG tablet, Take 1 tablet (10 mg) by mouth every 6 hours as needed (nausea/vomiting not controlled by Ondansetron (OK to take both)), Disp: 90 tablet, Rfl: 3  senna-docusate (SENOKOT-S;PERICOLACE) 8.6-50 MG per tablet, Take 2 tablets by mouth At Bedtime May increase to 2 tabs twice  daily if necessary., Disp: 100 tablet, Rfl: 3      Patient Active Problem List:     Type 2 diabetes mellitus treated without insulin (H)     Primary clear cell carcinoma of kidney, left (H)     Essential hypertension with goal blood pressure less than 130/80     Anxiety disorder due to general medical condition     Failure to thrive in adult     Abdominal pain, generalized     Medical cannabis--Certified eligible on 11/25/18      Documentation of Face to Face and Certification for Home Health Services    I certify that patient, Raphael Marin is under my care and that I, or a Nurse Practitioner or Physician's Assistant working with me, had a face-to-face encounter that meets the physician face-to-face encounter requirements with this patient on: 11/25/2018.    This encounter with the patient was in whole, or in part, for the following medical condition, which is the primary reason for Home Health Care: Palliative.    I certify that, based on my findings, the following services are medically necessary Home Health Services: Physical Therapy    My clinical findings support the need for the above services because: Physical Therapy Services are needed to assess and treat the following functional impairments: weakness.    Further, I certify that my clinical findings support that this patient is homebound (i.e. absences from home require considerable and taxing effort and are for medical reasons or Yazdanism services or infrequently or of short duration when for other reasons) because: Requires assistance of another person or specialized equipment to access medical services because patient: Requires supervision of another for safe transfer..    Based on the above findings, I certify that this patient is confined to the home and needs intermittent skilled nursing care, physical therapy and/or speech therapy.  The patient is under my care, and I have initiated the establishment of the plan of care.  This patient will be followed  by a physician who will periodically review the plan of care.    Physician/Provider to provide follow up care: Lul Guerrero    Responsible PECOS certified Physician at time of discharge: Lloyd Canales MD    Please be aware that coverage of these services is subject to the terms and limitations of your health insurance plan.  Call member services at your health plan with any benefit or coverage questions.             Follow-Up Appointment Instructions     Future Labs/Procedures    Follow-up and recommended labs and tests      Comments:    Follow-up with [facility] in your home is already arranged for later today.      Follow-Up Appointment Instructions     Follow-up and recommended labs and tests        Follow-up with [facility] in your home is already arranged for later today.             Statement of Approval     Ordered          11/29/18 0922  I have reviewed and agree with all the recommendations and orders detailed in this document.  EFFECTIVE NOW     Approved and electronically signed by:  Surinder Rivera MD

## 2018-11-23 NOTE — IP AVS SNAPSHOT
` `     [facility] INTENSIVE CARE: [phone]            Medication Administration Report for Raphael Marin as of 11/29/18 1024   Legend:    Given Hold Not Given Due Canceled Entry Other Actions    Time Time (Time) Time  Time-Action       Inactive    Active    Linked        Medications 11/23/18 11/24/18 11/25/18 11/26/18 11/27/18 11/28/18 11/29/18    acetaminophen (TYLENOL) Suppository 650 mg  Dose: 650 mg  Freq: EVERY 4 HOURS PRN Route: RE  PRN Reason: mild pain  Start: 11/23/18 1700   Admin Instructions: Alternate ibuprofen (if ordered) with acetaminophen.  Maximum acetaminophen dose from all sources = 75 mg/kg/day not to exceed 4 grams/day.    Admin. Amount: 1 suppository (1 × 650 mg suppository)  Dispense Loc: [facility]               acetaminophen (TYLENOL) tablet 650 mg  Dose: 650 mg  Freq: EVERY 4 HOURS PRN Route: PO  PRN Reason: mild pain  Start: 11/23/18 1700   Admin Instructions: Alternate ibuprofen (if ordered) with acetaminophen.  Maximum acetaminophen dose from all sources = 75 mg/kg/day not to exceed 4 grams/day.    Admin. Amount: 2 tablet (2 × 325 mg tablet)  Last Admin: 11/24/18 2353  Dispense Loc: [facility]      2353 (650 mg)-Given [C]                albuterol (PROVENTIL) neb solution 2.5 mg  Dose: 2.5 mg  Freq: EVERY 6 HOURS PRN Route: NEBULIZATION  PRN Reason: wheezing  Start: 11/23/18 1841   Admin. Amount: 2.5 mg = 3 mL Conc: 2.5 mg/3 mL  Last Admin: 11/26/18 0025  Dispense Loc: [facility]  Volume: 3 mL        0025 (2.5 mg)-Given              atorvastatin (LIPITOR) tablet 10 mg  Dose: 10 mg  Freq: AT BEDTIME Route: PO  Start: 11/23/18 2200   Admin. Amount: 1 tablet (1 × 10 mg tablet)  Last Admin: 11/28/18 2239  Dispense Loc: [facility]     2311 (10 mg)-Given        2151 (10 mg)-Given        2028 (10 mg)-Given [C]               2106 (10 mg)-Given        2220 (10 mg)-Given        2239 (10 mg)-Given        [ ] 2200           benzonatate (TESSALON) capsule 200 mg  Dose: 200 mg  Freq: 3 TIMES  DAILY PRN Route: PO  PRN Reason: cough  Start: 11/23/18 1838   Admin. Amount: 2 capsule (2 × 100 mg capsule)  Last Admin: 11/27/18 2220  Dispense Loc: [facility]       1541 (200 mg)-Given         0414 (200 mg)-Given       2220 (200 mg)-Given             bisacodyl (DULCOLAX) EC tablet 5 mg  Dose: 5 mg  Freq: DAILY PRN Route: PO  PRN Reason: constipation  Start: 11/23/18 1700   Admin Instructions: Do not crush or chew. Swallow whole. May titrate 1 tablet each day until response. Maximum of 3 tablets daily. Hold for loose stools. This is the first step of a three step constipation treatment.    Admin. Amount: 1 tablet (1 × 5 mg tablet)  Dispense Loc: [facility]              Or  bisacodyl (DULCOLAX) EC tablet 10 mg  Dose: 10 mg  Freq: DAILY PRN Route: PO  PRN Reason: constipation  Start: 11/23/18 1700   Admin Instructions: Do not crush or chew. Swallow whole. May titrate 1 tablet each day until response. Maximum of 3 tablets daily. Hold for loose stools. This is the first step of a three step constipation treatment.    Admin. Amount: 2 tablet (2 × 5 mg tablet)  Dispense Loc: [facility]              Or  bisacodyl (DULCOLAX) EC tablet 15 mg  Dose: 15 mg  Freq: DAILY PRN Route: PO  PRN Reason: constipation  Start: 11/23/18 1700   Admin Instructions: Do not crush or chew. Swallow whole. May titrate 1 tablet each day until response. Maximum of 3 tablets daily. Hold for loose stools. This is the first step of a three step constipation treatment.    Admin. Amount: 3 tablet (3 × 5 mg tablet)  Dispense Loc: [facility]               busPIRone (BUSPAR) tablet 10 mg  Dose: 10 mg  Freq: 2 TIMES DAILY Route: PO  Start: 11/23/18 2000   Admin. Amount: 1 tablet (1 × 10 mg tablet)  Last Admin: 11/29/18 0822  Dispense Loc: [facility]     2004 (10 mg)-Given        0942 (10 mg)-Given       2151 (10 mg)-Given        0739 (10 mg)-Given       2028 (10 mg)-Given        0751 (10 mg)-Given       2105 (10 mg)-Given        0746 (10  mg)-Given       2015 (10 mg)-Given        0858 (10 mg)-Given       1948 (10 mg)-Given        0822 (10 mg)-Given       [ ] 2000           calcium carbonate (TUMS) chewable tablet 500 mg  Dose: 500 mg  Freq: 3 TIMES DAILY PRN Route: PO  PRN Reason: heartburn  Start: 11/28/18 1504   Admin. Amount: 1 tablet (1 × 500 mg tablet)  Last Admin: 11/29/18 0401  Dispense Loc: [facility]          1523 (500 mg)-Given        0401 (500 mg)-Given           dexamethasone (DECADRON) tablet 8 mg  Dose: 8 mg  Freq: EVERY 12 HOURS SCHEDULED Route: PO  Start: 11/24/18 0800   Admin. Amount: 2 tablet (2 × 4 mg tablet)  Last Admin: 11/29/18 0823  Dispense Loc: [facility]      0942 (8 mg)-Given       2151 (8 mg)-Given        0739 (8 mg)-Given       2021 (8 mg)-Given        0751 (8 mg)-Given       2105 (8 mg)-Given        0746 (8 mg)-Given       2016 (8 mg)-Given        0858 (8 mg)-Given       1948 (8 mg)-Given        0823 (8 mg)-Given       [ ] 2000           dronabinol (MARINOL) capsule 5 mg  Dose: 5 mg  Freq: 2 TIMES DAILY Route: PO  Start: 11/27/18 1600   Admin. Amount: 2 capsule (2 × 2.5 mg capsule)  Last Admin: 11/29/18 0938  Dispense Loc: [facility]         1703 (5 mg)-Given        0905 (5 mg)-Given       1522 (5 mg)-Given        0938 (5 mg)-Given       [ ] 1600           enoxaparin (LOVENOX) injection 40 mg  Dose: 40 mg  Freq: EVERY 24 HOURS Route: SC  Start: 11/26/18 0345   Admin. Amount: 40 mg = 0.4 mL Conc: 40 mg/0.4 mL  Last Admin: 11/29/18 0401  Dispense Loc: [facility]  Volume: 0.4 mL        0351 (40 mg)-Given        0404 (40 mg)-Given        0309 (40 mg)-Given        0401 (40 mg)-Given           glucose gel 15-30 g  Dose: 15-30 g  Freq: EVERY 15 MIN PRN Route: PO  PRN Reason: low blood sugar  Start: 11/23/18 1840   Admin Instructions: Give 15 g for BG 51 to 69 mg/dL IF patient is conscious and able to swallow. Give 30 g for BG less than or equal to 50 mg/dL IF patient is conscious and able to swallow. Do NOT give glucose  gel via enteral tube.  IF patient has enteral tube: give apple juice 120 mL (4 oz or 15 g of CHO) via enteral tube for BG 51 to 69 mg/dL.  Give apple juice 240 mL (8 oz or 30 g of CHO) via enteral tube for BG less than or equal to 50 mg/dL.    ~Oral gel is preferable for conscious and able to swallow patient.   ~IF gel unavailable or patient refuses may provide apple juice 120 mL (4 oz or 15 g of CHO). Document juice on I and O flowsheet.    Admin. Amount: 15-30 g  Dispense Loc: [facility]  Volume: 93.75 mL              Or  dextrose 50 % injection 25-50 mL  Dose: 25-50 mL  Freq: EVERY 15 MIN PRN Route: IV  PRN Reason: low blood sugar  Start: 11/23/18 1840   Admin Instructions: Use if have IV access, BG less than 70 mg/dL and meet dose criteria below:  Dose if conscious and alert (or disorientated) and NPO = 25 mL  Dose if unconscious / not alert = 50 mL  Vesicant. For ordered doses up to 25 g, give IV Push undiluted. Give each 5g over 1 minute.    Admin. Amount: 25-50 mL  Dispense Loc: [facility]  Infused Over: 1-5 Minutes  Volume: 50 mL              Or  glucagon injection 1 mg  Dose: 1 mg  Freq: EVERY 15 MIN PRN Route: SC  PRN Reason: low blood sugar  PRN Comment: May repeat x 1 only  Start: 11/23/18 1840   Admin Instructions: May give SQ or IM. ONLY use glucagon IF patient has NO IV access AND is UNABLE to swallow AND blood glucose is LESS than or EQUAL to 50 mg/dL.  If ordered IV, give IV Push over 1 minute. Reconstitute with 1mL sterile water.    Admin. Amount: 1 mg  Dispense Loc: [facility]               heparin 100 UNIT/ML injection 5 mL  Dose: 5 mL  Freq: EVERY 28 DAYS Route: IK  Start: 11/23/18 2100   Admin Instructions: ONLY to de-access each port in dual implanted port.  Flush with 10 mL NS followed by 5 mL heparin (100 units/mL) at discharge and at least every 28 days.  MAX: 5 mL per port    Admin. Amount: 5 mL  Last Admin: 11/29/18 0939  Dispense Loc: [facility]  Volume: 5 mL   Current Line: Port  A Cath Single 06/14/18 Right Chest wall     (0043)-Not Given [C]            0939 (5 mL)-Given           heparin lock flush 10 UNIT/ML injection 5-10 mL  Dose: 5-10 mL  Freq: EVERY 1 HOUR PRN Route: IK  PRN Reason: other  PRN Comment: to lock each port in dual implanted port.  Start: 11/23/18 2057   Admin Instructions: MAX: 5 mL per port.    Admin. Amount: 5-10 mL  Last Admin: 11/26/18 0306  Dispense Loc: [facility]  Volume: 10 mL        0306 (5 mL)-Given              heparin lock flush 10 UNIT/ML injection 5-10 mL  Dose: 5-10 mL  Freq: EVERY 24 HOURS Route: IK  Start: 11/23/18 2100   Admin Instructions: To lock each dormant port in dual implanted port.  Check PRN heparin flush order to see when last dose of PRN heparin was given before administering.   MAX: 5 mL per port.    Admin. Amount: 5-10 mL  Last Admin: 11/28/18 1949  Dispense Loc: [facility]  Volume: 10 mL   Current Line: Port A Cath Single 06/14/18 Right Chest wall    2128 (5 mL)-Given        (2206)-Not Given        (2020)-Not Given        (2106)-Not Given        (2032)-Not Given        1949 (5 mL)-Given               [ ] 2100           HYDROcodone-acetaminophen (NORCO) 5-325 MG per tablet 1-2 tablet  Dose: 1-2 tablet  Freq: EVERY 4 HOURS PRN Route: PO  PRN Reason: moderate to severe pain  Start: 11/23/18 1839   Admin Instructions: Maximum acetaminophen dose from all sources= 75 mg/kg/day not to exceed 4 grams    Admin. Amount: 1-2 tablet  Last Admin: 11/29/18 1018  Dispense Loc: [facility]         0645 (1 tablet)-Given       1213 (1 tablet)-Given        0224 (1 tablet)-Given       1159 (1 tablet)-Given       1523 (1 tablet)-Given       1948 (1 tablet)-Given        0401 (2 tablet)-Given       1018 (2 tablet)-Given           HYDROmorphone (PF) (DILAUDID) injection 0.3-0.5 mg  Dose: 0.3-0.5 mg  Freq: EVERY 2 HOURS PRN Route: IV  PRN Reason: moderate to severe pain  Start: 11/23/18 0057   Admin Instructions: For ordered IV doses 0.1-4 mg give IV Push  undiluted. Administer each 2mg over 2-5 minutes.    Admin. Amount: 0.3-0.5 mg  Last Admin: 11/26/18 0937  Dispense Loc: [facility]   Current Line: Port A Cath Single 06/14/18 Right Chest wall       0937 (0.5 mg)-Given              insulin aspart (NovoLOG) inj (RAPID ACTING)  Dose: 1-7 Units  Freq: AT BEDTIME Route: SC  Start: 11/23/18 2200   Admin Instructions: HIGH INSULIN RESISTANCE DOSING    Do Not give Bedtime Correction Insulin if BG less than 200.   For  - 224 give 1 units.   For  - 249 give 2 units.   For  - 274 give 3 units.   For  - 299 give 4 units.   For  - 324 give 5 units.   For  - 349 give 6 units.   For BG greater than or equal to 350 give 7 units.   Notify provider if glucose greater than or equal to 350 mg/dL after administration of correction dose.  If given at mealtime, administer within 30 minutes of start of meal    Admin. Amount: 1-7 Units  Last Admin: 11/28/18 2240  Dispense Loc: [facility] for dose  Volume: 3 mL      0040 (1 Units)-Given [C]       2152 (1 Units)-Given        (2153)-Not Given        (2200)-Not Given        2221 (4 Units)-Given        2240 (5 Units)-Given        [ ] 2200           insulin aspart (NovoLOG) inj (RAPID ACTING)  Dose: 1-10 Units  Freq: 3 TIMES DAILY BEFORE MEALS Route: SC  Start: 11/24/18 0730   Admin Instructions: Correction Scale - HIGH INSULIN RESISTANCE DOSING     Do Not give Correction Insulin if Pre-Meal BG less than 140.   For Pre-Meal  - 164 give 1 unit.   For Pre-Meal  - 189 give 2 units.   For Pre-Meal  - 214 give 3 units.   For Pre-Meal  - 239 give 4 units.   For Pre-Meal  - 264 give 5 units.   For Pre-Meal  - 289 give 6 units.   For Pre-Meal  - 314 give 7 units.   For Pre-Meal  - 339 give 8 units.   For Pre-Meal  - 364 give 9 units.   For Pre-Meal BG greater than or equal to 365 give 10 units  To be given with prandial insulin, and based on pre-meal blood glucose.  "  Notify provider if glucose greater than or equal to 350 mg/dL after administration of correction dose.  If given at mealtime, administer within 30 minutes of start of meal    Admin. Amount: 1-10 Units  Last Admin: 11/29/18 0813  Dispense Loc: Contact Rx for dose  Volume: 3 mL      0940 (3 Units)-Given       1335 (3 Units)-Given       1730 (6 Units)-Given        0834 (3 Units)-Given       1251 (7 Units)-Given       1748 (6 Units)-Given        0753 (2 Units)-Given [C]       1234 (7 Units)-Given [C]       1813 (5 Units)-Given        0750 (3 Units)-Given       1213 (7 Units)-Given       1704 (6 Units)-Given [C]        0859 (5 Units)-Given       1157 (8 Units)-Given       1651 (6 Units)-Given        0813 (8 Units)-Given       [ ] 1130       [ ] 1630           levETIRAcetam (KEPPRA) tablet 500 mg  Dose: 500 mg  Freq: 2 TIMES DAILY Route: PO  Start: 11/23/18 2000   Admin. Amount: 1 tablet (1 × 500 mg tablet)  Last Admin: 11/29/18 0819  Dispense Loc: [facility]     2002 (500 mg)-Given        0942 (500 mg)-Given       2150 (500 mg)-Given        0739 (500 mg)-Given       2020 (500 mg)-Given        0750 (500 mg)-Given       2106 (500 mg)-Given        0747 (500 mg)-Given       2016 (500 mg)-Given        0857 (500 mg)-Given       1948 (500 mg)-Given        0819 (500 mg)-Given       [ ] 2000           lidocaine (LMX4) kit  Freq: EVERY 1 HOUR PRN Route: Top  PRN Reason: moderate pain  PRN Comment: with VAD insertion or accessing implanted port  Start: 11/23/18 2057   Admin Instructions: Do NOT give if patient has a history of allergy to any local anesthetic or any \"chantel\" product.   Apply 30 minutes prior to VAD insertion or port access.  MAX Dose:  2.5 g (  of 5 g tube)    Dispense Loc: [facility]               lidocaine (LMX4) kit  Freq: EVERY 2 HOURS PRN Route: Top  PRN Reason: moderate pain  Start: 11/23/18 2025   Admin Instructions: Apply to port/access site prior to blood draws    Dispense Loc: [facility] " "[facility]  Administrations Rem[facility]in               lidocaine 1 % 1 mL  Dose: 1 mL  Freq: EVERY 1 HOUR PRN Route: OTHER  PRN Comment: mild pain with VAD insertion or accessing implanted port  Start: 18   Admin Instructions: Do NOT give if patient has a history of allergy to any local anesthetic or any \"chantel\" product. MAX dose 1 mL subcutaneous OR intradermal in divided doses.    Admin. Amount: 1 mL  Dispense Loc: [facility]  Volume: 2 mL               LORazepam (ATIVAN) injection 0.5 mg  Dose: 0.5 mg  Freq: EVERY 4 HOURS PRN Route: IV  PRN Reason: anxiety  Start: 18 0336   Admin Instructions: For IV PUSH: Dilute with equal volume of NS. For ordered IV doses 0.1-4 mg give IV Push. Administer each 2mg over 1-5 minutes.    Admin. Amount: 0.5 mg = 0.25 mL Conc: 2 mg/mL  Last Admin: 18 0246  [facility]pense Loc: [email or web address]  Volume: 1 mL   Current Line: Port A Cath Single 18 Right Chest wall       0351 (0.5 mg)-Given       0750 (0.5 mg)-Given         0246 (0.5 mg)-Given            magnesium citrate solution 148 mL  Dose: 148 mL  Freq: ONCE PRN Route: PO  PRN Reason: constipation  Start: 18   Admin Instructions: May repeat in one hour x 1 if insufficient results. Avoid in severe renal disease. Hold for loose stools.  This is the third step of a three step constipation treatment.    Admin. Amount: 148 mL  [facility]  Administrations Remainin  Volume: 296 mL               melatonin tablet 1 mg  Dose: 1 mg  Freq: AT BEDTIME PRN Route: PO  PRN Reason: sleep  Start: 18   Admin Instructions: Do not give unless at least 6 hours of uninterrupted sleep is expected.    Admin. Amount: 1 tablet (1 × 1 mg tablet)  Last Admin: 18  Dispense Loc: [facility]         2220 (1 mg)-Given             metFORMIN (GLUCOPHAGE) tablet 500 mg  Dose: 500 mg  Freq: 2 TIMES DAILY WITH MEALS Route: PO  Start: 18 0800   Admin Instructions: If the patient receives " intravenous, iodinated contrast and patient GFR is greater than 60 mL/min/1.7m2, continue metformin.  Contact provider for 'hold' or 'no hold' instructions if no GFR or if GFR is less than 60 mL/min/1.7m2.    Admin. Amount: 1 tablet (1 × 500 mg tablet)  Last Admin: 11/29/18 0819  Dispense Loc: [facility]        0937 (500 mg)-Given       1813 (500 mg)-Given        0747 (500 mg)-Given       1703 (500 mg)-Given        0858 (500 mg)-Given       1651 (500 mg)-Given        0819 (500 mg)-Given       [ ] 1730           methylphenidate (RITALIN) tablet 5 mg  Dose: 5 mg  Freq: 2 TIMES DAILY Route: PO  Start: 11/27/18 1300   Admin. Amount: 1 tablet (1 × 5 mg tablet)  Last Admin: 11/29/18 0819  Dispense Loc: [facility]         1213 (5 mg)-Given        0905 (5 mg)-Given       1432 (5 mg)-Given        0819 (5 mg)-Given       [ ] 1300           naloxone (NARCAN) injection 0.1-0.4 mg  Dose: 0.1-0.4 mg  Freq: EVERY 2 MIN PRN Route: IV  PRN Reason: opioid reversal  Start: 11/26/18 0243   Admin Instructions: For respiratory rate LESS than or EQUAL to 8.  Partial reversal dose:  0.1 mg titrated q 2 minutes for Analgesia Side Effects Monitoring Sedation Level of 3 (frequently drowsy, arousable, drifts to sleep during conversation).Full reversal dose:  0.4 mg bolus for Analgesia Side Effects Monitoring Sedation Level of 4 (somnolent, minimal or no response to stimulation).  For ordered IV doses 0.1-2mg give IVP. Give each 0.4mg over 15 seconds in emergency situations. For non-emergent situations further dilute in 9mL of NS to facilitate titration of response.    Admin. Amount: 0.1-0.4 mg = 0.25-1 mL Conc: 0.4 mg/mL  Dispense Loc: [facility]  Volume: 1 mL               ondansetron (ZOFRAN-ODT) ODT tab 4 mg  Dose: 4 mg  Freq: EVERY 6 HOURS PRN Route: PO  PRN Reasons: nausea,vomiting  Start: 11/23/18 1700   Admin Instructions: This is Step 1 of nausea and vomiting management.  If nausea not resolved in 15 minutes, go to Step 2  prochlorperazine (COMPAZINE). Do not push through foil backing. Peel back foil and gently remove. Place on tongue immediately. Administration with liquid unnecessary  With dry hands, peel back foil backing and gently remove tablet; do not push oral disintegrating tablet through foil backing; administer immediately on tongue and oral disintegrating tablet dissolves in seconds; then swallow with saliva; liquid not required.    Admin. Amount: 1 tablet (1 × 4 mg tablet)  Dispense Loc: [facility]                                                               Or  ondansetron (ZOFRAN) injection 4 mg  Dose: 4 mg  Freq: EVERY 6 HOURS PRN Route: IV  PRN Reasons: nausea,vomiting  Start: 11/23/18 1700   Admin Instructions: This is Step 1 of nausea and vomiting management.  If nausea not resolved in 15 minutes, go to Step 2 prochlorperazine (COMPAZINE).  Irritant. For ordered IV doses 0.1-4 mg, give IV Push undiluted over 2-5 minutes.    Admin. Amount: 4 mg = 2 mL Conc: 4 mg/2 mL  Last Admin: 11/27/18 2225  Dispense Loc: [facility]  Infused Over: 2-5 Minutes  Volume: 2 mL   Current Line: Port A Cath Single 06/14/18 Right Chest wall     0804 (4 mg)-Given       1540 (4 mg)-Given        0834 (4 mg)-Given       2358 (4 mg)-Given        0609 (4 mg)-Given       2100 (4 mg)-Given        2225 (4 mg)-Given             pantoprazole (PROTONIX) EC tablet 40 mg  Dose: 40 mg  Freq: EVERY MORNING BEFORE BREAKFAST Route: PO  Start: 11/24/18 0630   Admin Instructions: DO NOT CRUSH.    Admin. Amount: 1 tablet (1 × 40 mg tablet)  Last Admin: 11/29/18 0655  Dispense Loc: [facility]      0639 (40 mg)-Given        0608 (40 mg)-Given        0606 (40 mg)-Given        0625 (40 mg)-Given        0624 (40 mg)-Given        0655 (40 mg)-Given           polyethylene glycol (MIRALAX/GLYCOLAX) Packet 17 g  Dose: 17 g  Freq: DAILY PRN Route: PO  PRN Reason: constipation  Start: 11/23/18 1840   Admin Instructions: Give in 8oz of  water, juice, or soda. Hold for  loose stools.  This is the second step of a three step constipation treatment.  1 Packet = 17 grams. Mixed prescribed dose in 8 ounces of water. Follow with 8 oz. of water.    Admin. Amount: 17 g  Dispense Loc: [facility]               prochlorperazine (COMPAZINE) injection 10 mg  Dose: 10 mg  Freq: EVERY 6 HOURS PRN Route: IV  PRN Reasons: nausea,vomiting  Start: 11/23/18 1700   Admin Instructions: This is Step 2 of nausea and vomiting management. Give if nausea not resolved 15 minutes after giving ondansetron (ZOFRAN).  If nausea not resolved in 15 minutes, go to Step 3 metoclopramide (REGLAN), if ordered.  For ordered IV doses 0.1-10 mg, give IV Push undiluted. Each 5mg over 1 minute.    Admin. Amount: 10 mg = 2 mL Conc: 5 mg/mL  Dispense Loc: [facility]  Infused Over: 1-2 Minutes  Volume: 2 mL              Or  prochlorperazine (COMPAZINE) tablet 10 mg  Dose: 10 mg  Freq: EVERY 6 HOURS PRN Route: PO  PRN Reason: vomiting  Start: 11/23/18 1700   Admin Instructions: This is Step 2 of nausea and vomiting management. Give if nausea not resolved 15 minutes after giving ondansetron (ZOFRAN).  If nausea not resolved in 15 minutes, go to Step 3 metoclopramide (REGLAN), if ordered.    Admin. Amount: 1 tablet (1 × 10 mg tablet)  Dispense Loc: [facility]              Or  prochlorperazine (COMPAZINE) Suppository 25 mg  Dose: 25 mg  Freq: EVERY 12 HOURS PRN Route: RE  PRN Reasons: nausea,vomiting  Start: 11/23/18 1700   Admin Instructions: This is Step 2 of nausea and vomiting management. Give if nausea not resolved 15 minutes after giving ondansetron (ZOFRAN).  If nausea not resolved in 15 minutes, go to Step 3 metoclopramide (REGLAN), if ordered.    Admin. Amount: 1 suppository (1 × 25 mg suppository)  Dispense Loc: [facility]               senna-docusate (SENOKOT-S;PERICOLACE) 8.6-50 MG per tablet 1 tablet  Dose: 1 tablet  Freq: 2 TIMES DAILY Route: PO  Start: 11/23/18 2000   Admin Instructions: If no bowel movement  in 24 hours, increase to 2 tablets PO.  Hold for loose stools.    Admin. Amount: 1 tablet  Last Admin: 11/27/18 2016  Dispense Loc: [facility]     2002 (1 tablet)-Given               2150 (1 tablet)-Given        0739 (1 tablet)-Given                      2106 (1 tablet)-Given               2016 (1 tablet)-Given                              [ ] 2000          Or  senna-docusate (SENOKOT-S;PERICOLACE) 8.6-50 MG per tablet 2 tablet  Dose: 2 tablet  Freq: 2 TIMES DAILY Route: PO  Start: 11/23/18 2000   Admin Instructions: Hold for loose stools.    Admin. Amount: 2 tablet  Last Admin: 11/29/18 0759  Dispense Loc: [facility]             0941 (2 tablet)-Given                      2013 (2 tablet)-Given        0750 (2 tablet)-Given               0747 (2 tablet)-Given               0856 (2 tablet)-Given       1948 (2 tablet)-Given        0759 (2 tablet)-Given       [ ] 2000           sodium chloride (PF) 0.9% PF flush 10-20 mL  Dose: 10-20 mL  Freq: EVERY 1 HOUR PRN Route: IK  PRN Reasons: line flush,post meds or blood draw  Start: 11/23/18 2057   Admin Instructions: And Daily PRN, to de-access each port in dual implanted port.  Flush with 10 mL NS followed by 5 mL heparin (100 units/mL) at discharge and at least every 28 days.    Admin. Amount: 10-20 mL  Last Admin: 11/28/18 1229  Dispense Loc: [facility]  Volume: 20 mL   Current Line: Port A Cath Single 06/14/18 Right Chest wall        0625 (30 mL)-Given [C]        1229 (10 mL)-Given            sodium chloride (PF) 0.9% PF flush 10-20 mL  Dose: 10-20 mL  Freq: EVERY 1 HOUR PRN Route: IK  PRN Reasons: line flush,post meds or blood draw  PRN Comment: To flush each CVC Implanted port.  Start: 11/23/18 2057   Admin Instructions: 10 mL post IV meds;   20 mL post blood draw.    Admin. Amount: 10-20 mL  Last Admin: 11/28/18 0610  Dispense Loc: [facility]  Volume: 20 mL         0627 (10 mL)-Given        0610 (20 mL)-Given           Completed Medications  Medications  18         Dose: 10 mg  Freq: ONCE Route: RE  Start: 18 1130   End: 18 1600   Admin. Amount: 1 suppository (1 × 10 mg suppository)  Last Admin: 18 1600  Dispense Loc: [facility]  Administrations Remainin         1600 (10 mg)-Given               Dose: 40 mg  Freq: EVERY 4 HOURS Route: IV  Start: 18 0345   End: 18 1246   Admin Instructions: For ordered IV doses 1-40 mg, give IV Push undiluted over 1-2 minutes.    Admin. Amount: 40 mg = 4 mL Conc: 10 mg/mL  Last Admin: 18 1244  Dispense Loc: FLK ADS CC  Infused Over: 1-2 Minutes  Administrations Remainin  Volume: 4 mL   Current Line: Port A Cath Single 18 Right Chest wall       0351 (40 mg)-Given       0751 (40 mg)-Given       1244 (40 mg)-Given             Discontinued Medications  Medications 18         Dose: 2.5 mg  Freq: 2 TIMES DAILY Route: PO  Start: 18 1530   End: 18 1147   Admin. Amount: 1 capsule (1 × 2.5 mg capsule)  Last Admin: 18 0746  Dispense Loc: FLK ADS CC      1533 (2.5 mg)-Given       2149 (2.5 mg)-Given        0739 (2.5 mg)-Given       2021 (2.5 mg)-Given        0749 (2.5 mg)-Given       2105 (2.5 mg)-Given        0746 (2.5 mg)-Given       1147-Med Discontinued           Dose: 0.1-0.4 mg  Freq: EVERY 2 MIN PRN Route: IV  PRN Reason: opioid reversal  Start: 18 1656   End: 18 0838   Admin Instructions: For respiratory rate LESS than or EQUAL to 8.  Partial reversal dose:  0.1 mg titrated q 2 minutes for Analgesia Side Effects Monitoring Sedation Level of 3 (frequently drowsy, arousable, drifts to sleep during conversation).Full reversal dose:  0.4 mg bolus for Analgesia Side Effects Monitoring Sedation Level of 4 (somnolent, minimal or no response to stimulation).  For ordered IV doses 0.1-2mg give IVP. Give each 0.4mg over 15 seconds in emergency  situations. For non-emergent situations further dilute in 9mL of NS to facilitate titration of response.    Admin. Amount: 0.1-0.4 mg = 0.25-1 mL Conc: 0.4 mg/mL  Dispense Loc: [facility]  Volume: 1 mL         [facility]

## 2018-11-23 NOTE — IP AVS SNAPSHOT
[facility]    [address] [address]    Phone:  [phone]    Fax:  [phone]                                       After Visit Summary   11/23/2018    Raphael Marin    MRN: [id number]           After Visit Summary Signature Page     I have received my discharge instructions, and my questions have been answered. I have discussed any challenges I see with this plan with the nurse or doctor.    ..........................................................................................................................................  Patient/Patient Representative Signature      ..........................................................................................................................................  Patient Representative Print Name and Relationship to Patient    ..................................................               ................................................  Date                                   Time    ..........................................................................................................................................  Reviewed by Signature/Title    ...................................................              ..............................................  Date                                               Time          22EPIC Rev 08/18

## 2018-11-23 NOTE — TELEPHONE ENCOUNTER
Raphael presents with parents to radiation reporting weakness/fatigue, inability to eat, bloating. Father states he needs 1 person to stand and transfer to bathroom and is at risk of falling at home.  -119   /87  Temp 97.9  02 91-92% on room air.  MD discussing potential admission due to declining status.

## 2018-11-23 NOTE — IP AVS SNAPSHOT
"[facility] INTENSIVE CARE: [phone]                                              INTERAGENCY TRANSFER FORM - LAB / IMAGING / EKG / EMG RESULTS   2018                    Hospital Admission Date: 2018  ALCIRA POLO   : 1980  Sex: Male        Attending Provider: Surinder Rivera MD     Allergies:  No Known Allergies    Infection:  None   Service:  HOSPITALIST    Ht:  1.753 m (5' 9\")   Wt:  96 kg (211 lb 10.3 oz)   Admission Wt:  79 kg (174 lb 2.6 oz)    BMI:  31.25 kg/m 2   BSA:  2.16 m 2            Patient PCP Information     Provider PCP Type    Lul Guerrero MD General         Lab Results - 3 Days      Glucose by meter [[id number]] (Abnormal)  Resulted: 18 0815, Result status: Final result    Ordering provider: Lloyd Canales MD  18 0804 Resulting lab: POINT OF CARE TEST, GLUCOSE    Specimen Information    Type Source Collected On     18 0804          Components       Value Reference Range Flag Lab   Glucose 316 70 - 99 mg/dL H 170            Glucose by meter [[id number]] (Abnormal)  Resulted: 18 2210, Result status: Final result    Ordering provider: Lloyd Canales MD  18 2158 Resulting lab: POINT OF CARE TEST, GLUCOSE    Specimen Information    Type Source Collected On     18 215          Components       Value Reference Range Flag Lab   Glucose 314 70 - 99 mg/dL H 170            Glucose by meter [[id number]] (Abnormal)  Resulted: 18 1638, Result status: Final result    Ordering provider: Lloyd Canales MD  18 1626 Resulting lab: POINT OF CARE TEST, GLUCOSE    Specimen Information    Type Source Collected On     18 1626          Components       Value Reference Range Flag Lab   Glucose 285 70 - 99 mg/dL H 170            Glucose by meter [[id number]] (Abnormal)  Resulted: 18 1157, Result status: Final result    Ordering provider: Lloyd Canales MD  18 1145 Resulting lab: POINT OF CARE TEST, " GLUCOSE    Specimen Information    Type Source Collected On     11/28/18 1145          Components       Value Reference Range Flag Lab   Glucose 327 70 - 99 mg/dL H 170            Comprehensive metabolic panel [[id number]] (Abnormal)  Resulted: 11/28/18 0650, Result status: Final result    Ordering provider: Belgica Guerrero APRN CNP  11/28/18 0000 Resulting lab: [facility]    Specimen Information    Type Source Collected On   Blood  11/28/18 0610          Components       Value Reference Range Flag Lab   Sodium 133 133 - 144 mmol/L  59   Potassium 3.9 3.4 - 5.3 mmol/L  59   Chloride 92 94 - 109 mmol/L L 59   Carbon Dioxide 35 20 - 32 mmol/L H 59   Anion Gap 6 3 - 14 mmol/L  59   Glucose 256 70 - 99 mg/dL H 59   Urea Nitrogen 28 7 - 30 mg/dL  59   Creatinine 1.10 0.66 - 1.25 mg/dL  59   GFR Estimate 74 >60 mL/min/1.7m2  59   Comment:  Non  GFR Calc   GFR Estimate If Black >90 >60 mL/min/1.7m2  59   Comment:  African American GFR Calc   Calcium 9.8 8.5 - 10.1 mg/dL  59   Bilirubin Total 0.4 0.2 - 1.3 mg/dL  59   Albumin 2.3 3.4 - 5.0 g/dL L 59   Protein Total 5.9 6.8 - 8.8 g/dL L 59   Alkaline Phosphatase 47 40 - 150 U/L  59   ALT 17 0 - 70 U/L  59   AST 14 0 - 45 U/L  59            Glucose by meter [[id number]] (Abnormal)  Resulted: 11/28/18 0253, Result status: Final result    Ordering provider: Lloyd Canales MD  11/28/18 0200 Resulting lab: POINT OF CARE TEST, GLUCOSE    Specimen Information    Type Source Collected On     11/28/18 0200          Components       Value Reference Range Flag Lab   Glucose 277 70 - 99 mg/dL H 170            Glucose by meter [[id number]] (Abnormal)  Resulted: 11/27/18 2209, Result status: Final result    Ordering provider: Lloyd Canales MD  11/27/18 2156 Resulting lab: POINT OF CARE TEST, GLUCOSE    Specimen Information    Type Source Collected On     11/27/18 2156          Components       Value Reference Range Flag Lab   Glucose 285 70 - 99  mg/dL H 170            Glucose by meter [[id number]] (Abnormal)  Resulted: 11/27/18 1756, Result status: Final result    Ordering provider: Lloyd Canales MD  11/27/18 1702 Resulting lab: POINT OF CARE TEST, GLUCOSE    Specimen Information    Type Source Collected On     11/27/18 1702          Components       Value Reference Range Flag Lab   Glucose 281 70 - 99 mg/dL H 170            Glucose by meter [[id number]] (Abnormal)  Resulted: 11/27/18 1203, Result status: Final result    Ordering provider: Lloyd Canales MD  11/27/18 1151 Resulting lab: POINT OF CARE TEST, GLUCOSE    Specimen Information    Type Source Collected On     11/27/18 1151          Components       Value Reference Range Flag Lab   Glucose 291 70 - 99 mg/dL H 170            Basic metabolic panel [[id number]] (Abnormal)  Resulted: 11/27/18 0658, Result status: Final result    Ordering provider: Surinder Rivera MD  11/27/18 0000 Resulting lab: [facility]    Specimen Information    Type Source Collected On   Blood  11/27/18 0620          Components       Value Reference Range Flag Lab   Sodium 135 133 - 144 mmol/L  59   Potassium 3.6 3.4 - 5.3 mmol/L  59   Chloride 92 94 - 109 mmol/L L 59   Carbon Dioxide 36 20 - 32 mmol/L H 59   Anion Gap 7 3 - 14 mmol/L  59   Glucose 234 70 - 99 mg/dL H 59   Urea Nitrogen 27 7 - 30 mg/dL  59   Creatinine 1.32 0.66 - 1.25 mg/dL H 59   GFR Estimate 60 >60 mL/min/1.7m2 L 59   Comment:  Non  GFR Calc   GFR Estimate If Black 73 >60 mL/min/1.7m2  59   Comment:  African American GFR Calc   Calcium 11.6 8.5 - 10.1 mg/dL H 59            Glucose by meter [[id number]] (Abnormal)  Resulted: 11/27/18 0651, Result status: Final result    Ordering provider: Lloyd Canales MD  11/27/18 0639 Resulting lab: POINT OF CARE TEST, GLUCOSE    Specimen Information    Type Source Collected On     11/27/18 0639          Components       Value Reference Range Flag Lab   Glucose 214 70 - 99  mg/dL H 170            Lactic acid whole blood [[id number]] (Abnormal)  Resulted: 11/27/18 0644, Result status: Final result    Ordering provider: Surinder Rivera MD  11/27/18 0606 Resulting lab: [facility]    Specimen Information    Type Source Collected On   Blood  11/27/18 0620          Components       Value Reference Range Flag Lab   Lactic Acid 2.6 0.7 - 2.0 mmol/L H 59   Comment:         Significant value called to and read back by  MARCK ARIAS RN. 11/27/18 AT 0644 AS              Calcium ionized whole blood [[id number]] (Abnormal)  Resulted: 11/27/18 0644, Result status: Final result    Ordering provider: Surinder Rivera MD  11/27/18 0620 Resulting lab: [facility]    Specimen Information    Type Source Collected On     11/27/18 0620          Components       Value Reference Range Flag Lab   Calcium Ionized Whole Blood 6.2 4.4 - 5.2 mg/dL H 59            Calcium ionized whole blood [[id number]]  Resulted: 11/27/18 0638, Result status: In process    Ordering provider: Surinder Rivera MD  11/27/18 0000 Resulting lab: [facility]    Specimen Information    Type Source Collected On   Blood  11/27/18 0620            Glucose by meter [[id number]] (Abnormal)  Resulted: 11/27/18 0237, Result status: Final result    Ordering provider: Lloyd Canales MD  11/27/18 0224 Resulting lab: POINT OF CARE TEST, GLUCOSE    Specimen Information    Type Source Collected On     11/27/18 0224          Components       Value Reference Range Flag Lab   Glucose 252 70 - 99 mg/dL H 170            Glucose by meter [[id number]] (Abnormal)  Resulted: 11/26/18 2138, Result status: Final result    Ordering provider: Lloyd Canales MD  11/26/18 2126 Resulting lab: POINT OF CARE TEST, GLUCOSE    Specimen Information    Type Source Collected On     11/26/18 2126          Components       Value Reference Range Flag Lab   Glucose 194 70 - 99 mg/dL H 170            Glucose by meter  [[id number]] (Abnormal)  Resulted: 11/26/18 1636, Result status: Final result    Ordering provider: Lloyd Canales MD  11/26/18 1623 Resulting lab: POINT OF CARE TEST, GLUCOSE    Specimen Information    Type Source Collected On     11/26/18 1623          Components       Value Reference Range Flag Lab   Glucose 253 70 - 99 mg/dL H 170            Parathyroid Hormone Intact [[id number]] (Abnormal)  Resulted: 11/26/18 1243, Result status: Final result    Ordering provider: Lloyd Canales MD  11/26/18 0000 Resulting lab: [facility]    Specimen Information    Type Source Collected On   Blood  11/26/18 0455          Components       Value Reference Range Flag Lab   Parathyroid Hormone Intact 9 18 - 80 pg/mL L 51            Methicillin Resist/Sens S. aureus PCR [[id number]]  Resulted: 11/26/18 1145, Result status: Final result    Ordering provider: Archie Winters PA-C  11/26/18 0243 Resulting lab: [facility]    Specimen Information    Type Source Collected On   Nares  11/26/18 0255          Components       Value Reference Range Flag Lab   Specimen Description Nares   59   Methicillin Resist/Sens S. aureus PCR Negative NEG^Negative  51   Comment:         MRSA Negative: SA Positive  MRSA target DNA not detected, presumed negative for MRSA colonization or the   number of bacteria present may be below the limit of detection.  Staphylococcus aureus target DNA detected, presumed positive for SA   colonization.  A positive test does not necessarily indicate the presence of viable   organisms.  It is, however, presumptive for the presence of SA.  This result   does not preclude MRSA nasal colonization.  FDA approved assay performed using [employer] GeneXpert(R) real-time PCR.              Glucose by meter [[id number]] (Abnormal)  Resulted: 11/26/18 1135, Result status: Final result    Ordering provider: Lloyd Canales MD  11/26/18 1123 Resulting lab:  POINT OF CARE TEST, GLUCOSE    Specimen Information    Type Source Collected On     11/26/18 1123          Components       Value Reference Range Flag Lab   Glucose 301 70 - 99 mg/dL H 170            Glucose by meter [[id number]] (Abnormal)  Resulted: 11/26/18 0731, Result status: Final result    Ordering provider: Lloyd Canales MD  11/26/18 0717 Resulting lab: POINT OF CARE TEST, GLUCOSE    Specimen Information    Type Source Collected On     11/26/18 0717          Components       Value Reference Range Flag Lab   Glucose 178 70 - 99 mg/dL H 170            Comprehensive metabolic panel [[id number]] (Abnormal)  Resulted: 11/26/18 0535, Result status: Final result    Ordering provider: Belgica Guerrero APRN CNP  11/26/18 0000 Resulting lab: [facility]    Specimen Information    Type Source Collected On   Blood  11/26/18 0455          Components       Value Reference Range Flag Lab   Sodium 136 133 - 144 mmol/L  59   Potassium 3.5 3.4 - 5.3 mmol/L  59   Chloride 93 94 - 109 mmol/L L 59   Carbon Dioxide 34 20 - 32 mmol/L H 59   Anion Gap 9 3 - 14 mmol/L  59   Glucose 244 70 - 99 mg/dL H 59   Urea Nitrogen 25 7 - 30 mg/dL  59   Creatinine 1.19 0.66 - 1.25 mg/dL  59   GFR Estimate 68 >60 mL/min/1.7m2  59   Comment:  Non  GFR Calc   GFR Estimate If Black 82 >60 mL/min/1.7m2  59   Comment:  African American GFR Calc   Calcium 11.7 8.5 - 10.1 mg/dL H 59   Bilirubin Total 0.4 0.2 - 1.3 mg/dL  59   Albumin 2.6 3.4 - 5.0 g/dL L 59   Protein Total 6.6 6.8 - 8.8 g/dL L 59   Alkaline Phosphatase 49 40 - 150 U/L  59   ALT 18 0 - 70 U/L  59   AST 16 0 - 45 U/L  59            CBC with platelets [[id number]]  Resulted: 11/26/18 0517, Result status: Final result    Ordering provider: Lloyd Canales MD  11/26/18 0000 Resulting lab: [facility]    Specimen Information    Type Source Collected On   Blood  11/26/18 0455          Components       Value Reference Range Flag Lab    WBC 10.1 4.0 - 11.0 10e9/L  59   RBC Count 5.08 4.4 - 5.9 10e12/L  59   Hemoglobin 14.2 13.3 - 17.7 g/dL  59   Hematocrit 43.5 40.0 - 53.0 %  59   MCV 86 78 - 100 fl  59   MCH 28.0 26.5 - 33.0 pg  59   MCHC 32.6 31.5 - 36.5 g/dL  59   RDW 12.7 10.0 - 15.0 %  59   Platelet Count 190 150 - 450 10e9/L  59            PTH Related Peptide Test [[id number]]  Resulted: 11/26/18 0504, Result status: In process    Ordering provider: Lloyd Canales MD  11/26/18 0000 Resulting lab: [facility]    Specimen Information    Type Source Collected On   Blood  11/26/18 0455            CBC with platelets differential [[id number]] (Abnormal)  Resulted: 11/26/18 0118, Result status: Final result    Ordering provider: Archie Winters PA-C  11/26/18 0032 Resulting lab: [facility]    Specimen Information    Type Source Collected On   Blood  11/26/18 0045          Components       Value Reference Range Flag Lab   WBC 9.5 4.0 - 11.0 10e9/L  59   RBC Count 4.87 4.4 - 5.9 10e12/L  59   Hemoglobin 13.9 13.3 - 17.7 g/dL  59   Hematocrit 41.7 40.0 - 53.0 %  59   MCV 86 78 - 100 fl  59   MCH 28.5 26.5 - 33.0 pg  59   MCHC 33.3 31.5 - 36.5 g/dL  59   RDW 12.8 10.0 - 15.0 %  59   Platelet Count 182 150 - 450 10e9/L  59   Diff Method Automated Method   59   % Neutrophils 90.6 %  59   % Lymphocytes 5.5 %  59   % Monocytes 3.0 %  59   % Eosinophils 0.2 %  59   % Basophils 0.1 %  59   % Immature Granulocytes 0.6 %  59   Nucleated RBCs 0 0 /100  59   Absolute Neutrophil 8.6 1.6 - 8.3 10e9/L H 59   Absolute Lymphocytes 0.5 0.8 - 5.3 10e9/L L 59   Absolute Monocytes 0.3 0.0 - 1.3 10e9/L  59   Absolute Eosinophils 0.0 0.0 - 0.7 10e9/L  59   Absolute Basophils 0.0 0.0 - 0.2 10e9/L  59   Abs Immature Granulocytes 0.1 0 - 0.4 10e9/L  59   Absolute Nucleated RBC 0.0   59            Comprehensive metabolic panel [[id number]] (Abnormal)  Resulted: 11/26/18 0111, Result status: Final result    Ordering provider: Archie Winters PA-C   11/26/18 0032 Resulting lab: [facility]    Specimen Information    Type Source Collected On   Blood  11/26/18 0045          Components       Value Reference Range Flag Lab   Sodium 137 133 - 144 mmol/L  59   Potassium 4.0 3.4 - 5.3 mmol/L  59   Chloride 97 94 - 109 mmol/L  59   Carbon Dioxide 33 20 - 32 mmol/L H 59   Anion Gap 7 3 - 14 mmol/L  59   Glucose 256 70 - 99 mg/dL H 59   Urea Nitrogen 24 7 - 30 mg/dL  59   Creatinine 1.15 0.66 - 1.25 mg/dL  59   GFR Estimate 71 >60 mL/min/1.7m2  59   Comment:  Non  GFR Calc   GFR Estimate If Black 86 >60 mL/min/1.7m2  59   Comment:  African American GFR Calc   Calcium 11.1 8.5 - 10.1 mg/dL H 59   Bilirubin Total 0.4 0.2 - 1.3 mg/dL  59   Albumin 2.5 3.4 - 5.0 g/dL L 59   Protein Total 6.2 6.8 - 8.8 g/dL L 59   Alkaline Phosphatase 47 40 - 150 U/L  59   ALT 18 0 - 70 U/L  59   AST 17 0 - 45 U/L  59            Nt probnp inpatient [[id number]]  Resulted: 11/26/18 0111, Result status: Final result    Ordering provider: Archie Winters PA-C  11/26/18 0032 Resulting lab: [facility]    Specimen Information    Type Source Collected On   Blood  11/26/18 0045          Components       Value Reference Range Flag Lab   N-Terminal Pro BNP Inpatient 83 0 - 450 pg/mL  59   Comment:            Reference range shown and results flagged as abnormal are suggested inpatient   cut points for confirming diagnosis if CHF in an acute setting. Establishing a   baseline value for each individual patient is useful for follow-up. An   inpatient or emergency department NT-proPBNP <300 pg/mL effectively rules out   acute CHF, with 99% negative predictive value.  The outpatient non-acute reference range for ruling out CHF is:   0-125 pg/mL (age 18 to less than 75)   0-450 pg/mL (age 75 yrs and older)              Calcium ionized whole blood [[id number]] (Abnormal)  Resulted: 11/26/18 0103, Result status: Final result    Ordering provider: Ally  Lloyd HUTCHINS MD  11/25/18 1623 Resulting lab: [facility]    Specimen Information    Type Source Collected On   Blood  11/26/18 0045          Components       Value Reference Range Flag Lab   Calcium Ionized Whole Blood 6.6 4.4 - 5.2 mg/dL H 59            Blood gas venous [[id number]] (Abnormal)  Resulted: 11/26/18 0103, Result status: Final result    Ordering provider: Archie Winters PA-C  11/26/18 0032 Resulting lab: [facility]    Specimen Information    Type Source Collected On   Blood  11/26/18 0045          Components       Value Reference Range Flag Lab   Ph Venous 7.48 7.32 - 7.43 pH H 59   PCO2 Venous 49 40 - 50 mm Hg  59   PO2 Venous 34 25 - 47 mm Hg  59   Bicarbonate Venous 36 21 - 28 mmol/L H 59   Base Excess Venous 11.0 mmol/L  59   Comment:  Abnormal Result, Ref range: -7.7 to 1.9            Testing Performed By     Lab - Abbreviation Name Director Address Valid Date Range    45 - XCA592 [facility] Unknown Unknown 01/28/02 0000 - Present    51 - Unknown [facility] Unknown [address] [facility] [address] 12/31/14 1010 - Present    59 - Unknown [facility] Unknown [address] [facility] [address] 12/31/14 1006 - Present    170 - Unknown POINT OF CARE TEST, GLUCOSE Unknown Unknown 10/31/11 1114 - Present            Unresulted Labs     None         Imaging Results - 3 Days      CT Chest pulmonary embolism w contrast [[id number]]  Resulted: 11/26/18 0338, Result status: Final result    Ordering provider: Archie Winters PA-C  11/26/18 0155 Resulted by: Terrence Lawson MD    Performed: 11/26/18 0224 - 11/26/18 0248 Resulting lab: RADIOLOGY RESULTS    Narrative:       CT CHEST WITH CONTRAST   11/26/2018 2:41 AM     HISTORY: New hypoxia.    COMPARISON: 11/23/2018 - CT chest, abdomen and pelvis.    TECHNIQUE: Following the uneventful administration of 80 mL Isovue-370  intravenous contrast, helical sections  were acquired through the lungs  according to the pulmonary embolism protocol. Coronal reconstructions  were generated. Radiation dose for this scan was reduced using  automated exposure control, adjustment of the mA and/or kV according  to the patient's size, or iterative reconstruction technique.    FINDINGS: No visualized pulmonary embolus. The thoracic aorta is  normal in caliber without dissection. Right anterior chest wall port  with catheter entering the right internal jugular vein and distal tip  in the right atrium.    Several mass-like opacities scattered within both lungs, including a  dominant 11 cm opacity in the right lung base, not convincingly  changed since 11/23/2018. Mild groundglass opacities scattered with in  the central aspects of both lungs, new. Tiny left pleural effusion  again noted. No right pleural or pericardial effusion. Several mildly  and moderately enlarged mediastinal and right hilar lymph nodes,  unchanged. For example, there is a 2.9 x 2.8 cm right hilar lymph node  (series 4,  image 69). Patchy lucencies within bilateral humeral heads  again noted.    Scan through the upper abdomen is significant for a complex 11 x 8 cm  fluid collection abutting the posterior wall of the stomach (series 4,  image 134) that has moderately increased in size. Again noted are  moderate diffuse fatty infiltration of the liver, moderate nodularity  of the right adrenal gland, prior left nephrectomy, a 9 cm  heterogeneous mass centered in the left renal fossa and mild  splenomegaly.      Impression:       IMPRESSION:   1. Mild groundglass opacities in the central aspects of both lungs  that could represent atelectasis or pulmonary edema.  2. No visualized pulmonary embolus.  3. Extensive metastatic neoplasm in the chest including several  bilateral pulmonary masses, several enlarged thoracic lymph nodes, a  probable right adrenal metastasis, probable bilateral humeral  metastasis, and a large  metastasis in the left renal fossa, not  convincingly changed since the recent study dated 11/23/2018.  4. Nonspecific moderate-sized irregular-shaped 11 x 8 cm fluid  collection in the upper left hemiabdomen and abutting the posterior  wall of the stomach, moderately increased in size.    TERRENCE LAWSON MD      Chest 2 Views* [[id number]]  Resulted: 11/26/18 0137, Result status: Final result    Ordering provider: Archie Winters PA-C  11/26/18 0025 Resulted by: Terrence Lawson MD    Performed: 11/26/18 0052 - 11/26/18 0113 Resulting lab: RADIOLOGY RESULTS    Narrative:       CHEST 2 VIEWS   11/26/2018 1:13 AM     HISTORY: New hypoxia.    COMPARISON: 11/23/2018 - CT chest, abdomen and pelvis.    FINDINGS: Moderate-sized mass-like opacities in bilateral lung bases  in addition to a few smaller patchy nodular opacities scattered within  the upper and mid lungs bilaterally, not convincingly changed since  the comparison CT scan dated 11/23/2018, allowing for differences in  technique. Normal-sized cardiac silhouette. Right anterior chest wall  port with catheter entering the right internal jugular vein and distal  tip in the right atrium.      Impression:       IMPRESSION:   1. No convincing interval change since 10/23/2018.  2. A few opacities scattered within both lungs, including dominant  moderate-sized mass-like opacities in bilateral lung bases.    TERRENCE LAWSON MD      Testing Performed By     Lab - Abbreviation Name Director Address Valid Date Range    104 - Rad [facility] RADIOLOGY RESULTS Unknown Unknown 02/16/05 1553 - Present            Encounter-Level Documents:     There are no encounter-level documents.      Order-Level Documents:     There are no order-level documents.

## 2018-11-23 NOTE — IP AVS SNAPSHOT
` ` Patient Information     Patient Name Sex     Raphael Marin ([id number]) Male 1980       Room Bed    1003 1003-01      Patient Demographics     Address Phone E-mail Address    [facility] [address] [place] [address] [phone] (Home) [email or web address]      Patient Ethnicity & Race     Ethnic Group Patient Race    American White      Emergency Contact(s)     Name Relation Home Work Mobile    CHARMAINE RETANA Mother   [phone]    Charles Retana (stepfather)    [phone]      Documents on File        Status Date Received Description       Documents for the Patient    Consent for Services - [facility] Received 18     Consent for Services - Hospital and Clinic       Privacy Notice - [place] Received 18     HIE Auth Received 18     Consent to Communicate Received 18 Enemy Swim-mother,Charles-step father    Consent for Services - Hospital and Clinic Received 18 CONSENT FOR SERVICES - HOSPITAL AND CLINIC    Consent to Communicate  18 AUTHORIZATION TO VERBALLY DISCUSS PROTECTED HEALTH INFORMATION 18    Consent for EHR Access       Insurance Card       Patient ID       [facility] Specified Other       CE Prospective Auth Received () 18 Authorization Document from Social Security Administration    System-Retrieved CE Auth Form Received () 18 External Authorization Form from Social Security Administration       Documents for the Encounter    CMS IM for Patient Signature       Observation Notice Received 18       Admission Information     Attending Provider Admitting Provider Admission Type Admission Date/Time    Surinder Rivera MD Dummer, Michael M, MD Elective 18  1648    Discharge Date Hospital Service Auth/Cert Status [facility]     Hospitalist [facility]    Unit Room/Bed Admission Status       [place] INTENSIVE CARE 1003/1003-01 Admission (Confirmed)       Admission     Complaint    failure to thrive, , weakness, Failure to thrive  (0-17), Failure to thrive in adult, Hypercalcemia      Hospital Account     Name Acct ID Class Status Primary Coverage    Raphael Marin [id number] Inpatient Open BLUE PLUS - BLUE PLUS [place]            Guarantor Account (for Hospital Account #[id number])     Name Relation to Pt Service Area Active? Acct Type    AmeevirajRaphael Self FCS Yes Personal/Family    Address Phone          [address] [place] [address] [phone](H)              Coverage Information (for Hospital Account #[id number])     F/O Payor/Plan Precert #    BLUE PLUS/BLUE PLUS [place]     Subscriber Subscriber #    AmeeRaphael cali [id number]    Address Phone    [address] [address]  [place] [address] [phone]

## 2018-11-24 ENCOUNTER — APPOINTMENT (OUTPATIENT)
Dept: PHYSICAL THERAPY | Facility: CLINIC | Age: 38
End: 2018-11-24
Attending: FAMILY MEDICINE
Payer: COMMERCIAL

## 2018-11-24 ENCOUNTER — APPOINTMENT (OUTPATIENT)
Dept: OCCUPATIONAL THERAPY | Facility: CLINIC | Age: 38
End: 2018-11-24
Attending: FAMILY MEDICINE
Payer: COMMERCIAL

## 2018-11-24 LAB
GLUCOSE BLDC GLUCOMTR-MCNC: 192 MG/DL (ref 70–99)
GLUCOSE BLDC GLUCOMTR-MCNC: 208 MG/DL (ref 70–99)
GLUCOSE BLDC GLUCOMTR-MCNC: 213 MG/DL (ref 70–99)
GLUCOSE BLDC GLUCOMTR-MCNC: 224 MG/DL (ref 70–99)
GLUCOSE BLDC GLUCOMTR-MCNC: 225 MG/DL (ref 70–99)
GLUCOSE BLDC GLUCOMTR-MCNC: 277 MG/DL (ref 70–99)

## 2018-11-24 PROCEDURE — 25000131 ZZH RX MED GY IP 250 OP 636 PS 637: Performed by: PHYSICIAN ASSISTANT

## 2018-11-24 PROCEDURE — 97161 PT EVAL LOW COMPLEX 20 MIN: CPT | Mod: GP | Performed by: PHYSICAL THERAPIST

## 2018-11-24 PROCEDURE — 25000128 H RX IP 250 OP 636: Performed by: PHYSICIAN ASSISTANT

## 2018-11-24 PROCEDURE — 40000133 ZZH STATISTIC OT WARD VISIT

## 2018-11-24 PROCEDURE — 25000132 ZZH RX MED GY IP 250 OP 250 PS 637: Performed by: PHYSICIAN ASSISTANT

## 2018-11-24 PROCEDURE — 40000274 ZZH STATISTIC RCP CONSULT EA 30 MIN

## 2018-11-24 PROCEDURE — 00000146 ZZHCL STATISTIC GLUCOSE BY METER IP

## 2018-11-24 PROCEDURE — 40000193 ZZH STATISTIC PT WARD VISIT: Performed by: PHYSICAL THERAPIST

## 2018-11-24 PROCEDURE — 12000000 ZZH R&B MED SURG/OB

## 2018-11-24 PROCEDURE — 25000132 ZZH RX MED GY IP 250 OP 250 PS 637: Performed by: FAMILY MEDICINE

## 2018-11-24 PROCEDURE — 97166 OT EVAL MOD COMPLEX 45 MIN: CPT | Mod: GO

## 2018-11-24 PROCEDURE — 25000128 H RX IP 250 OP 636: Performed by: FAMILY MEDICINE

## 2018-11-24 PROCEDURE — G0378 HOSPITAL OBSERVATION PER HR: HCPCS

## 2018-11-24 PROCEDURE — 97116 GAIT TRAINING THERAPY: CPT | Mod: GP | Performed by: PHYSICAL THERAPIST

## 2018-11-24 RX ORDER — MIRTAZAPINE 15 MG/1
15 TABLET, FILM COATED ORAL AT BEDTIME
Status: DISCONTINUED | OUTPATIENT
Start: 2018-11-24 | End: 2018-11-25

## 2018-11-24 RX ORDER — DRONABINOL 2.5 MG/1
2.5 CAPSULE ORAL 2 TIMES DAILY
Status: DISCONTINUED | OUTPATIENT
Start: 2018-11-24 | End: 2018-11-24

## 2018-11-24 RX ORDER — DRONABINOL 2.5 MG/1
2.5 CAPSULE ORAL 2 TIMES DAILY
Status: DISCONTINUED | OUTPATIENT
Start: 2018-11-24 | End: 2018-11-27

## 2018-11-24 RX ADMIN — ACETAMINOPHEN 650 MG: 325 TABLET, FILM COATED ORAL at 23:53

## 2018-11-24 RX ADMIN — SODIUM CHLORIDE, POTASSIUM CHLORIDE, SODIUM LACTATE AND CALCIUM CHLORIDE: 600; 310; 30; 20 INJECTION, SOLUTION INTRAVENOUS at 00:22

## 2018-11-24 RX ADMIN — DRONABINOL 2.5 MG: 2.5 CAPSULE ORAL at 15:33

## 2018-11-24 RX ADMIN — ATORVASTATIN CALCIUM 10 MG: 10 TABLET, FILM COATED ORAL at 21:51

## 2018-11-24 RX ADMIN — SENNOSIDES AND DOCUSATE SODIUM 1 TABLET: 8.6; 5 TABLET ORAL at 21:50

## 2018-11-24 RX ADMIN — MIRTAZAPINE 15 MG: 15 TABLET, FILM COATED ORAL at 21:50

## 2018-11-24 RX ADMIN — BUSPIRONE HYDROCHLORIDE 10 MG: 10 TABLET ORAL at 09:42

## 2018-11-24 RX ADMIN — INSULIN ASPART 6 UNITS: 100 INJECTION, SOLUTION INTRAVENOUS; SUBCUTANEOUS at 17:30

## 2018-11-24 RX ADMIN — DRONABINOL 2.5 MG: 2.5 CAPSULE ORAL at 21:49

## 2018-11-24 RX ADMIN — DEXAMETHASONE 8 MG: 4 TABLET ORAL at 21:51

## 2018-11-24 RX ADMIN — ONDANSETRON 4 MG: 2 INJECTION INTRAMUSCULAR; INTRAVENOUS at 08:04

## 2018-11-24 RX ADMIN — LEVETIRACETAM 500 MG: 500 TABLET, FILM COATED ORAL at 09:42

## 2018-11-24 RX ADMIN — PANTOPRAZOLE SODIUM 40 MG: 40 TABLET, DELAYED RELEASE ORAL at 06:39

## 2018-11-24 RX ADMIN — SENNOSIDES AND DOCUSATE SODIUM 2 TABLET: 8.6; 5 TABLET ORAL at 09:41

## 2018-11-24 RX ADMIN — BUSPIRONE HYDROCHLORIDE 10 MG: 10 TABLET ORAL at 21:51

## 2018-11-24 RX ADMIN — INSULIN ASPART 3 UNITS: 100 INJECTION, SOLUTION INTRAVENOUS; SUBCUTANEOUS at 13:35

## 2018-11-24 RX ADMIN — Medication 1 MG: at 23:54

## 2018-11-24 RX ADMIN — LEVETIRACETAM 500 MG: 500 TABLET, FILM COATED ORAL at 21:50

## 2018-11-24 RX ADMIN — SODIUM CHLORIDE, POTASSIUM CHLORIDE, SODIUM LACTATE AND CALCIUM CHLORIDE: 600; 310; 30; 20 INJECTION, SOLUTION INTRAVENOUS at 09:47

## 2018-11-24 RX ADMIN — SODIUM CHLORIDE, POTASSIUM CHLORIDE, SODIUM LACTATE AND CALCIUM CHLORIDE: 600; 310; 30; 20 INJECTION, SOLUTION INTRAVENOUS at 19:52

## 2018-11-24 RX ADMIN — DEXAMETHASONE 8 MG: 4 TABLET ORAL at 09:42

## 2018-11-24 RX ADMIN — INSULIN ASPART 3 UNITS: 100 INJECTION, SOLUTION INTRAVENOUS; SUBCUTANEOUS at 09:40

## 2018-11-24 RX ADMIN — ONDANSETRON 4 MG: 2 INJECTION INTRAMUSCULAR; INTRAVENOUS at 15:40

## 2018-11-24 ASSESSMENT — ACTIVITIES OF DAILY LIVING (ADL): IADL_COMMENTS: RELIES ON FAMILY

## 2018-11-24 NOTE — PLAN OF CARE
Problem: Patient Care Overview  Goal: Plan of Care/Patient Progress Review  Discharge Planner PT   Patient plan for discharge: Home  Current status: Pt is SBA with bed mobility, CGA with tranfers from recliner to FWW, CGA with FWW up to 25 feet with fatigue and slow speed  Barriers to return to prior living situation: increased need of assistance, fatigue, decreased functional capacity  Recommendations for discharge: Home with assist(when parents at home) vs. TCU  Rationale for recommendations: Decreased functional endurance, easily fatigues, increased need of assist, PT judgement       Entered by: Mike Delgadillo 11/24/2018 12:00 PM      Please Contact me with any questions or concerns. Thank you for for patience and cooperation.     Mike Delgadillo PT, DPT  Flexible Workforce Physical Therapist   [facility]  [email or web address]

## 2018-11-24 NOTE — PLAN OF CARE
Problem: Patient Care Overview  Goal: Plan of Care/Patient Progress Review  Discharge Planner OT   Patient plan for discharge: Home with assist  Current status: OT eval completed. Pt presents with cognitive impairment (not formally tested, but Pt demonstrates decreased processing speed and limited insight regarding current medical status), generalized weakness, and RUE weakness and FMC impairment (s/p brain tumor resection).  Pt currently requires close SBA-CGA for mobility with walker. Needs cues for walker safety and initiation of ADL tasks. Tolerates standing at sink for ~5 mins to complete g/h tasks, then needs to sit down. Participation in ADLs is limited by decreased activity tolerance and limited use of R hand.    Barriers to return to prior living situation: current medical status  Recommendations for discharge: Home with 24 hr supervision/assist, home OT vs TCU  Rationale for recommendations: Pt is not safe to be home alone during the day. He would benefit from ongoing OT at home vs TCU to improve activity tolerance and safety with ADLs.        Entered by: Zaida Romero 11/24/2018 12:32 PM

## 2018-11-24 NOTE — H&P
[facility]    History and Physical  Hospital Medicine       Date of Admission:  11/23/2018  Date of Service: 11/23/2018     CC: Poor oral intake, generalized weakness, falls    Assessment & Plan   Raphael Marin is a 38 year old male with a past medical history significant for renal cell carcinoma with mets to brain and lung s/p left nephrectomy and brain tumor resection, type 2 diabetes mellitus, hypertension, dyslipidemia, and anxiety who presents on 11/23/2018 with poor oral intake, generalized weakness, falls - directly admitted for evaluation of failure to thrive.      Failure to thrive in adult  Overall not doing well since starting whole brain radiation. Poor oral intake, dehydrated, generally weak. Family is concerned about his safety at home while they are gone at work. Patient was a direct admit request from Dr. Quijano (radiation oncologist) for further evaluation. Basic labs relatively unremarkable, VSS. Low evidence for infection. Work-up still in process.  -  ml bolus, followed by maintenance @ 125 ml/hr  - Social work consult to help obtain resources such as home health care  - PT/OT evaluations  - Nutrition consult to help address poor appetite      Abdominal pain, generalized  Poor oral intake  Postprandial fullness  Patient with inconsistent bowel movements, nausea, and vomiting. Develops postprandial fullness after just a few bites of food. Afebrile, minimal leukocytosis (WBC 11.1) upon admission. Tender on exam but not an acute abdomen. Concern for possible small bowel obstruction or other pathologic finding.  - Get CT chest, abdomen, pelvis to further evaluate  - Prn antiemetics available  - Bowel regimen (will put on hold if patient has small bowel obstruction)  - Prn pain medications available - Norco and dilaudid  - Nutrition consult as above  - Consider appetite stimulants if CT is normal      Generalized weakness, risk for falls  Will be alone all day starting  "this week while family is at work. Family concerned about patient's safety with his weakness. No focal weakness on exam. Brain mets likely affecting patient's balance and overall function. Recently had MRI at [facility] on 11/2/18 - low concern for CVA or other acute abnormality, no need to repeat imaging.   - PT/OT evaluations  - SW consult, would likely benefit from home health care      Cough   Chronic cough. Known lung mets. Chest CT on 11/14 [facility] with \"numerous areas of apparent lung consolidation... Likely related to metastases although slightly atypical.\" Afebrile, WBC 11.1, no hypoxia - low suspicion for pneumonia. Cough managed prior to admission with Tessalon and albuterol without much relief.  - Continue Tessalon  - CT chest/abdomen/pelvis pending  - Prn albuterol neb available      Tachycardia   Mild, rates in low 100's. Appears to have baseline heart rate in low 100's.  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 S/p left nephrectomy in June 2018, s/p brain tumor resection on 11/1/18 - all through [facility]. Follows with oncology Dr. Rodriguez through [facility], but is doing radiation at [facility], which is closer to home. On day of admission (11/23/2018) he received round 6 of 10 of whole brain radiation. Has been prescribed carozantinib for RCC but has not yet started taking it. Receives decadron 4 mg q 6 hours prior to admission for radiation induced inflammation. On Kera prior to admission.  - Hold prior to admission carozantinib  - Continue prior to admission decadron 4 mg q 6 hours  - Continue prior to admission Keppra  - Continue with outpatient management      Type 2 diabetes mellitus treated without insulin  Managed with metformin 1000 mg bid. Admit glucose = 239.  - HgbA1c pending  - Hold prior to admission metformin  - High dose sliding scale insulin  - Hyper/hypoglycemia protocol      Essential hypertension " with goal blood pressure less than 130/80  Pressures reviewed, stable. Not on antihypertensive medications prior to admission.  - Observe off of medications for now      Anxiety disorder due to general medical condition  Stable mood upon admission. Managed prior to admission with Buspar, continue.      Fluids:  ml  Bolus, then maintenance @ 125  Electrolytes: Monitor  Nutrition: Clear liquid    DVT Prophylaxis: Pneumatic Compression Devices  Code Status: Full Code - discussed directly with patient and his family    Lines: Port  Puentes catheter: Not indicated    Disposition: Anticipate discharge in 2+ day(s). Appropriate for inpatient care - unlikely able to return home in next 2 days with safety concerns.    Discussion: Assessment & plan discussed with Dr. Edgard Canales.    Joellen Holcomb PA-C  [facility]  Pager: [phone]          Primary Care Physician   Lul Guerrero [phone]    History is obtained from the patient and review of old records via the EMR.    Past Medical History      Past Medical History:   Diagnosis Date     Brain metastases (H)     resection 11/1/18 [place]     Renal cell carcinoma (H)     s/p left nephrectomy     Patient Active Problem List    Diagnosis Date Noted     Primary clear cell carcinoma of kidney, left (H) 07/27/2018     Priority: Medium     Overview:   Left renal clear cell carcinoma, pT3aN1; s/p nephrectomy; metastatic to lungs, lymph nodes, adrenal gland  1. May 17, 2018, left flank pain and gross hematuria leads to emergency room visit and CT abdomen and pelvis without contrast showing right lung nodules in the lung bases up to 2.1 cm, fatty liver, small splenial and large soft tissue mass arising in the upper anterior left kidney at 11 cm. There is also a retroperitoneal lymph node in the left periaortic region up to 1.8 cm. CT chest, abdomen and pelvis with contrast done later that same visit again showing a left  "cm mass, right-sided indeterminate nodules and the prominent periaortic lymph node and probable benign vascular lesions in the liver.    2. June 27, 2018, left radical nephrectomy with final pathology showing a 10 cm renal clear-cell carcinoma, grade 4 of 4, tumor invading into the perirenal adipose tissue and renal pelvis as well as the renal vein but all surgical margins negative, left adrenal gland negative, single left retroperitoneal lymph node is removed and positive for metastatic clear cell carcinoma measuring 0.7 cm, stage III (pT3a N1).   3. July 10, 2018, postoperative creatinine 1.38 but creatinine clearance still greater than 60.   4. July 27, 2018: Medical Oncology consultation; recommendation for restaging CT and adjuvant sunitinib if negative.  5. August 8, 2018: Restaging CT shows progression in size and number of multiple lung nodules, increased paraaortic adenopathy, and left adrenal nodule; all consistent with metastatic disease. Plans for sunitinib aborted.  6. August 14, 2018: Ipilumumab/nivolumab. 3 cycles received.  7. October 17, 2018: On routine visit for cycle 4 ipilumumab/nivolumab, complains of headache and right vision loss (that had resolved); urgent CT head without contrast and then MRI brain with and without contrast that day show multiple brain metastases with one dominant mass in the right frontal lobe measuring 3.5 cm x 3.5 cm x 3.3 cm with significant vasogenic edema, mass effect, and midline shift. Urgent referral to Neurosurgery placed for large symptomatic right frontal lobe mass as well as referral to [facility] for SRS consideration versus whole brain radiation for remaining lesions.      Last Assessment & Plan:   Left renal clear cell carcinoma, pT3aN1; s/p nephrectomy; metastatic to lungs, lymph nodes, adrenal gland  New headache he describes as \"the worst of my life\" that he awoke with on 10/12 and was associated with right vision loss 10/14 is concerning, " although the vision loss has now resolved; could be just migraine although he personally has never had migraines.  Other new issues is elevated calcium which I am not able to tie in to the headache.  Last MRI head was August and negative but I think we need to get CT head without contrast STAT today before proceeding with next immunotherapy.    ADDENDUM: CT head without contrast shows evidence of brain metastases; patient then sent back for STAT MRI brain with and without contrast that confirms multiple brain metastases with one dominant mass in the right frontal lobe measuring 3.5 cm x 3.5 cm x 3.3 cm with significant vasogenic edema, mass effect, and midline shift. After discussion with Dr. Drew Valdovinos, urgent referral to him (Neurosurgery) placed for large symptomatic right frontal lobe mass as well as referral to [facility] for SRS consideration versus whole brain radiation for remaining lesions after presumed resection of the right frontal lobe metastasis. Dexamethasone 10 mg IV given today and PO prescription for 4 mg PO TID and pantoprazole 40 mg PO daily sent. Ipilumumab/nivolumab is cancelled, but I did leave the follow up to see me in 3 weeks with new CT chest/abdomen/pelvis as previously scheduled although it is distinctly possible we will need to move that back.        Anxiety disorder due to general medical condition 07/10/2018     Priority: Medium     Overview:   History of mood issues without any benefit from SSRIs beyond buspirone which is helpful for anxiety.  Current anxiety symptoms related to his diagnosis of renal cell carcinoma       Type 2 diabetes mellitus treated without insulin (H) 04/19/2018     Priority: Medium     Overview:   No retinopathy noted on eye exam. 04/27/2018 Shashank Almanza, OD       Essential hypertension with goal blood pressure less than 130/80 09/16/2014     Priority: Medium        Past Surgical History     Past Surgical History:   Procedure Laterality  "Date     BRAIN TUMOR RESECTION Right 11/01/2018     NEPHRECTOMY Left 06/2018        History of Present Illness   Raphael Marin is a 38 year old male with the above past medical history now presents on 11/23/2018 with abdominal pain, poor oral intake, generalized weakness, and safety concerns.     Patient was diagnosed with renal cell carcinoma in May 2018. He follows with Dr. Rodriguez through [facility] system. He has left nephrectomy in June 2018. He developed headaches and was found to have mets to brain and lungs. On 11/1/18 he underwent right craniotomy for tumor resection. He is currently receiving whole brain radiation through Dr. Quijano at [facility] as it is closer to home for him. He has completed 6 of 10 rounds of radiation thus far, last radiation course was today 11/23/2018.    Since starting his radiation he has become progressively weaker, has lost his appetite, and has not been doing well at home. He denies any falls, but feels so weak that he has nearly fallen on a few occasions. He denies dizziness or syncope. Since his tumor resection he has some right arm weakness, but otherwise no focal weakness.    His abdominal problems started about a week ago. He often feels nauseated and \"gaggy,\" and on multiple occasions he has vomited his food, drink, or medications. No hematemesis. He has not been eating or drinking much. He develops post prandial fullness after only a few bites of food. He has difficulty with bowel movements, although his last bowel movement was this morning. He denies melena or hematochezia. He feels very bloated and at times constipated.     He has a chronic cough for the past few months with shortness of breath. He was told this is possibly related to his lung mets. He has tried some cough suppressants without much relief. No wheezing, fevers, chills.    Patient lives with his parents. Starting in 2 days, they will both be gone during the day at work. They are concerned about patient's " safety being home alone all day with his current state of health.     The remainder review of systems is negative.      Prior to Admission Medications   Prior to Admission Medications   Prescriptions Last Dose Informant Patient Reported? Taking?   Cabozantinib S-Malate (CABOMETYX) 60 MG not started Self Yes Yes   Sig: Take 60 mg by mouth daily   HYDROcodone-acetaminophen (NORCO) 5-325 MG per tablet Past Month at Unknown time Self Yes Yes   Sig: Take 1-2 tablets by mouth every 4 hours as needed for moderate to severe pain    albuterol (PROAIR HFA/PROVENTIL HFA/VENTOLIN HFA) 108 (90 Base) MCG/ACT inhaler 11/23/2018 at am Self Yes Yes   Sig: Inhale 2 puffs into the lungs every 4 hours as needed for shortness of breath / dyspnea or wheezing   atorvastatin (LIPITOR) 10 MG tablet 11/22/2018 at hs Self Yes Yes   Sig: Take 10 mg by mouth daily   benzonatate (TESSALON) 200 MG capsule Past Month at Unknown time Self Yes Yes   Sig: Take 200 mg by mouth 3 times daily as needed for cough    busPIRone (BUSPAR) 10 MG tablet 11/23/2018 at am Self Yes Yes   Sig: Take 10 mg by mouth 2 times daily   dexamethasone (DECADRON) 4 MG tablet 11/23/2018 at Unknown time Self No Yes   Sig: Take 1 tablet (4 mg) by mouth every 8 hours   Patient taking differently: Take 4 mg by mouth every 6 hours    docusate sodium (COLACE) 100 MG capsule 11/23/2018 at am Self Yes Yes   Sig: Take 100 mg by mouth 2 times daily as needed   levETIRAcetam (KEPPRA) 500 MG tablet 11/23/2018 at am Self Yes Yes   Sig: Take 500 mg by mouth 2 times daily   lidocaine-prilocaine (EMLA) cream  Self Yes Yes   Sig: Apply topically as needed for moderate pain Apply quarter size amount to port site 30 to 60 minutes prior to access.   metFORMIN (GLUCOPHAGE-XR) 500 MG 24 hr tablet 11/23/2018 at am Self Yes Yes   Sig: Take 1,000 mg by mouth 2 times daily (before meals)   order for DME  Self No No   Sig: Equipment being ordered: Hospital Bed  Requesting hospital bed for living room  on main floor. 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 Dizziness, fall risk, fatigue/malaise, difficulty navigating stairs due to brain mets. Also CERON/SOB affects mobility around home especially up and down stairs.   pantoprazole (PROTONIX) 40 MG EC tablet 11/23/2018 at am Self Yes Yes   Sig: Take 40 mg by mouth daily   polyethylene glycol (MIRALAX/GLYCOLAX) powder 11/23/2018 at am Self Yes Yes   Sig: Take 17 g by mouth daily      Facility-Administered Medications: None     Allergies   No Known Allergies    Family History    Family History   Problem Relation Age of Onset     Retinal detachment Mother      Cataracts Mother      Pancreatic Cancer Father      Diabetes Paternal Grandmother        Social History   Social History     Social History     Marital status:      Spouse name: N/A     Number of children: N/A     Years of education: N/A     Occupational History     Not on file.     Social History Main Topics     Smoking status: Former Smoker     Types: Cigarettes     Quit date: 2012     Smokeless tobacco: Never Used     Alcohol use No     Drug use: No     Sexual activity: Not on file     Other Topics Concern     Not on file     Social History Narrative       Review of Systems     A complete 10 point review of systems was negative except for items noted in the HPI/subjective.    Physical Exam   /89 (BP Location: Right arm)  Pulse 108  Temp 98.2  F (36.8  C) (Oral)  Resp 18  SpO2 93%     Weight: 0 lbs 0 oz There is no height or weight on file to calculate BMI.     Constitutional: Alert, oriented, cooperative, no apparent distress, appears nontoxic, speaking in full sentences.    Eyes: Eyes are clear, pupils are reactive. No scleral icterus. Extroccular movements intact.     HEENT: Oropharynx is clear and moist, no lesions. Large right frontal cranial scar present, well healing.     Cardiovascular: Regular rhythm and rate, normal S1 and S2. No murmur,  rubs, or gallops. Peripheral pulses in tact bilaterally. No lower extremity edema.    Respiratory: Faint crackles at right base. No wheezes or rhonchi.    GI: Soft, distended. Minimally tender to palpation of all quadrants, no rebound or guarding. No hepatosplenomegaly or masses appreciated. Hypoactive bowel sounds.     Musculoskeletal: Without obvious deformity, normal range of motion. Normal muscle bulk and tone.     Skin: Warm and dry, no rashes or ecchymoses. No mottling of skin. Cranial scar as above.     Neurologic: Patient moves all extremities. Cranial nerves are grossly intact.  is symmetric. Gross strength and sensation are equal bilaterally.    Genitourinary: Deferred    Data   Data reviewed today:     Recent Labs  Lab 11/23/18  1801   WBC 11.1*   HGB 15.2   MCV 86         POTASSIUM 4.5   CHLORIDE 101   CO2 24   BUN 33*   CR 1.10   ANIONGAP 11   MED 12.5*   *       No results found for this or any previous visit (from the past 24 hour(s)).    I personally reviewed no images or EKG's today.    Joellen Holcomb PA-C  [facility]  Pager: [phone]

## 2018-11-24 NOTE — PROGRESS NOTES
Raphael Marin   Initial IP Physical Therapy Evaluation  11/24/18 1000   Quick Adds   Type of Visit Initial PT Evaluation   Living Environment   Lives With parent(s)  (Lives with mother and step father)   Living Arrangements house   Number of Stairs to Enter Home 3   Number of Stairs Within Home 10   Stair Railings at Home inside, present on right side   Transportation Available family or friend will provide   Self-Care   Usual Activity Tolerance moderate   Current Activity Tolerance fair   Equipment Currently Used at Home (has cane and walker available through friend)   Functional Level Prior   Ambulation 0-->independent  (Sometimes needs help with all)   Transferring 0-->independent   Toileting 0-->independent   Bathing 0-->independent   Dressing 0-->independent   Eating 0-->independent   Fall history within last six months no   General Information   Onset of Illness/Injury or Date of Surgery - Date 11/23/18   Referring Physician Dr. 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 Overall not doing well since starting whole brain radiation. Poor oral intake, dehydrated, generally weak. Family is concerned about his safety at home while they are gone at work. Patient was a direct admit request from Dr. Quijano (radiation oncologist) for further evaluation. Basic labs relatively unremarkable, VSS. Low evidence for infection. Work-up still in process.   Precautions/Limitations no known precautions/limitations   Cognitive Status  Examination   Orientation orientation to person, place and time   Level of Consciousness alert   Follows Commands and Answers Questions 100% of the time   Personal Safety and Judgment intact   Memory intact   Pain Assessment   Patient Currently in Pain No   Integumentary/Edema   Integumentary/Edema no deficits were identifed   Posture    Posture Not impaired   Range of Motion (ROM)   ROM Quick Adds No deficits were identified   Strength   Manual Muscle Testing Quick Adds No deficits were identified   Transfer Skills   Transfer Transfer Skill:  Stand to Sit;Transfer Skill: Sit to Stand   Transfer Comments Pt is CGA with sit and stand and stand to sit transfers from recliner to FWW with verbal cues to psuh up from the arms.    Transfer Skill:  Sit to Stand   Level of [place]: Sit/Stand contact guard   Physical Assist/Nonphysical Assist: Sit/Stand set-up required;supervision;verbal cues   Weightbearing Restrictions: Sit/Stand full weight-bearing   Assistive Device for Transfer: Sit/Stand standard walker   Transfer Skill: Stand to Sit   Level of [place]: Stand/Sit contact guard   Physical Assist/Nonphysical Assist: Stand/Sit set-up required;supervision;verbal cues   Weight-Bearing Restrictions: Stand/Sit full weight-bearing   Assistive Device: Stand to Sit standard walker   Gait   Gait Gait Skill   Gait Comments Pt amb 25 feet in room with FWW CGA with verbal cues for safety and sequencing, management of lines and leads throughout.    Gait Skills   Level of [place]: Gait contact guard   Physical Assist/Nonphysical Assist: Gait set-up required;supervision;verbal cues   Weight-Bearing Restrictions: Gait full weight-bearing   Assistive Device for Transfer: Gait standard walker   Gait Distance 25 feet   Balance   Balance no deficits were identified   Sensory Examination   Sensory Perception no deficits were identified   Coordination   Coordination no deficits were identified   Muscle Tone   Muscle Tone no  "deficits were identified   General Therapy Interventions   Planned Therapy Interventions strengthening;transfer training;neuromuscular re-education;balance training;gait training   Intervention Comments too address functional impairments above, improve overall function   Clinical Impression   Criteria for Skilled Therapeutic Intervention yes, treatment indicated   PT Diagnosis weakness, decreased functional capacity   Influenced by the following impairments SOB, weakness   Functional limitations due to impairments increased need of assistance, decreased functional endurance and capacity   Clinical Presentation Evolving/Changing   Clinical Presentation Rationale medical status, subjective report, chart review, PT judgement   Clinical Decision Making (Complexity) Low complexity   Therapy Frequency` daily   Predicted Duration of Therapy Intervention (days/wks) 3 days   Anticipated Equipment Needs at Discharge front wheeled walker;standard cane  (if not available otherwise)   Anticipated Discharge Disposition Home with Assist;Transitional Care Facility  (pending progress with PT/medical )   Risk & Benefits of therapy have been explained Yes   Patient, Family & other staff in agreement with plan of care Yes   Clinical Impression Comments Pt is a pleasant 39 y/o male presenting to PT with generalized weakness due to complex medical status. He will benefit from skilled PT to address problems list above and improve his overall function. Pt is a fair PT candidate.   [facility]-PAC TM \"6 Clicks\"   2016, Trustees of [facility], under license to [employer].  All rights reserved.   6 Clicks Short Forms Basic Mobility Inpatient Short Form   [facility] AM-PAC  \"6 Clicks\" V.2 Basic Mobility Inpatient Short Form   1. Turning from your back to your side while in a flat bed without using bedrails? 4 - None   2. Moving from lying on your back to sitting on the side of a flat bed without using bedrails? 4 - " None   3. Moving to and from a bed to a chair (including a wheelchair)? 3 - A Little   4. Standing up from a chair using your arms (e.g., wheelchair, or bedside chair)? 3 - A Little   5. To walk in hospital room? 2 - A Lot   6. Climbing 3-5 steps with a railing? 2 - A Lot   Basic Mobility Raw Score (Score out of 24.Lower scores equate to lower levels of function) 18   Total Evaluation Time   Total Evaluation Time (Minutes) 7       Please Contact me with any questions or concerns. Thank you for for patience and cooperation.     Mike Delgadillo PT, DPT  Flexible Workforce Physical Therapist   [facility]  [email or web address]

## 2018-11-24 NOTE — CONSULTS
Care Transition Initial Assessment - RN  Reason For Consult: other (see comments) (Home care referral, PCA with the [facility])   Met with: Patient.    DATA   Principal Problem:    Failure to thrive in adult  Active Problems:    Type 2 diabetes mellitus treated without insulin (H)    Primary clear cell carcinoma of kidney, left (H)    Essential hypertension with goal blood pressure less than 130/80    Anxiety disorder due to general medical condition    Abdominal pain, generalized       Primary Care Clinic Name: [facility]  Primary Care MD Name: Lul Guerrero MD  Contact information and PCP information verified: Yes    ASSESSMENT  Cognitive Status: awake, alert and oriented.  Resources List: Home Care, PCA  Lives With: parent(s) (mom and step dad)  Living Arrangements: house  Description of Support System: Supportive, Involved   Who is your support system?: Parent(s)   Insurance Concerns: No Insurance issues identified, [place]    This writer met with pt, introduced self and role. Care Transitions is consulted for home care referral. This writer discussed discharge planning and Medicare guidelines in regards to home care. Pt lives with his parents in the community. Pt has no services at home and does have family support.  Patient is consulting his parents about  receiving home care in the event that these services are needed upon discharge.  Planning Ahead brochure resources provided. Transportation provided by patient family.  Patient stated that he has a [facility]  but he does not remember their name at this time, patient will talk to his mother about talking to his  if they decide to pursue the PCA services. Patient is interested in receiving PCA services from the [facility]. Pt was provided with Medicare certified home care list. Pt chooses to discuss with his MD and his family before he makes any decisions at this time. CTS to follow..     PLAN  Home with parents and  home care vs PCA services with the cyril Huggins RN Care Coordinator  [facility] [phone]  [facility] [phone]

## 2018-11-24 NOTE — PROGRESS NOTES
Port was accessed, able to flush but no blood return.  Pt reports he did not have blood return  With prior access either.  Spoke with SHONA Rodriguez.  Cath homa ordered and instilled for 60 minutes, poRt now has good blood return and flushes well.  IVFs changed to PORT access. Pt reports that he has had CT contrast thru is port in the past. RN updated.

## 2018-11-24 NOTE — PROGRESS NOTES
"PRIMARY DIAGNOSIS: \"GENERIC\" NURSING  OUTPATIENT/OBSERVATION GOALS TO BE MET BEFORE DISCHARGE:  1. ADLs back to baseline: No    2. Activity and level of assistance: Up with standby assistance.    3. Pain status: Improved-controlled with oral pain medications.    4. Return to near baseline physical activity: No     Discharge Planner Nurse   Safe discharge environment identified: No  Barriers to discharge: Yes       Entered by: Erin Larson 11/24/2018 6:53 AM     Please review provider order for any additional goals.   Nurse to notify provider when observation goals have been met and patient is ready for discharge.  "

## 2018-11-24 NOTE — PROGRESS NOTES
" 11/24/18 1046   Quick Adds   Type of Visit Initial Occupational Therapy Evaluation   Living Environment   Lives With parent(s)  (mom and step dad)   Living Arrangements house   Number of Stairs to Enter Home 3   Number of Stairs Within Home 10   Stair Railings at Home inside, present on right side   Transportation Available family or friend will provide   Living Environment Comment comb, Pt sleeps in upper level, bathroom is on main level   Self-Care   Dominant Hand right   Usual Activity Tolerance moderate   Current Activity Tolerance fair   Equipment Currently Used at Home none   Functional Level Prior   Ambulation 0-->independent  (family provides support as needed)   Transferring 0-->independent   Toileting 0-->independent   Bathing 0-->independent   Dressing 0-->independent   Eating 0-->independent   Communication 0-->understands/communicates without difficulty   Swallowing 0-->swallows foods/liquids without difficulty   Cognition 0 - no cognition issues reported  (per chart, brain mets)   Fall history within last six months no   Prior Functional Level Comment Pt reports he has been completing ADLs ind, with increasing difficulty. 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   Precautions/Limitations fall precautions   Cognitive Status Examination " "  Orientation orientation to person, place and time   Level of Consciousness alert   Cognitive Comment slow processing speed   Visual Perception   Visual Perception Wears glasses   Pain Assessment   Patient Currently in Pain Yes, see Vital Sign flowsheet  (\"stomach irritation\")   Range of Motion (ROM)   ROM Quick Adds No deficits were identified   Strength   Strength Comments LUE 4/5, RUE 4-/5   Coordination   Coordination Comments RUE FMC impaired, Pt reports this is d/t brain tumor resection   Transfer Skill: Sit to Stand   Level of [place]: Sit/Stand stand-by assist   Physical Assist/Nonphysical Assist: Sit/Stand supervision;verbal cues  (walker safety cues)   Transfer Skill: Sit to Stand full weight-bearing   Assistive Device for Transfer: Sit/Stand standard walker   Lower Body Dressing   Level of [place]: Dress Lower Body stand-by assist   Physical Assist/Nonphysical Assist: Dress Lower Body set-up required   Toileting   Level of [place]: Toilet stand-by assist   Physical Assist/Nonphysical Assist: Toilet set-up required   Assistive Device (urinal (this is his baseline))   Grooming   Level of [place]: Grooming contact guard  (walker safety cues, CGA in stance)   Physical Assist/Nonphysical Assist: Grooming 1 person assist   Instrumental Activities of Daily Living (IADL)   IADL Comments relies on family   Activities of Daily Living Analysis   Impairments Contributing to Impaired Activities of Daily Living balance impaired;cognition impaired;strength decreased;coordination impaired   General Therapy Interventions   Planned Therapy Interventions ADL retraining;transfer training   Clinical Impression   Criteria for Skilled Therapeutic Interventions Met yes, treatment indicated   OT Diagnosis decreased functional ind    Influenced by the following impairments metastatic cancer, s/p brain tumor resection with cognitinve and FMC impairment.    Assessment of Occupational Performance 3-5 Performance " "Deficits   Identified Performance Deficits safety with functional transfers, bathing, dressing, IADLs   Clinical Decision Making (Complexity) Moderate complexity   Therapy Frequency daily   Predicted Duration of Therapy Intervention (days/wks) 1 more session in inpatient setting   Anticipated Equipment Needs at Discharge shower chair   Anticipated Discharge Disposition Home with Assist;Transitional Care Facility   Risks and Benefits of Treatment have been explained. Yes   Patient, Family & other staff in agreement with plan of care Yes   Clinical Impression Comments Pt will benefit from 1 more OT session to address BR transfers. Recommend 24 hr supervsion and ongoing OT at [facility].    [facility] AM-PAC TM \"6 Clicks\"   2016, Trustees of [facility], under license to [employer].  All rights reserved.   6 Clicks Short Forms Daily Activity Inpatient Short Form   [facility] AM-PAC  \"6 Clicks\" Daily Activity Inpatient Short Form   1. Putting on and taking off regular lower body clothing? 3 - A Little   2. Bathing (including washing, rinsing, drying)? 3 - A Little   3. Toileting, which includes using toilet, bedpan or urinal? 3 - A Little   4. Putting on and taking off regular upper body clothing? 3 - A Little   5. Taking care of personal grooming such as brushing teeth? 3 - A Little   6. Eating meals? 4 - None   Daily Activity Raw Score (Score out of 24.Lower scores equate to lower levels of function) 19   Total Evaluation Time   Total Evaluation Time (Minutes) 15     See care plan for goals    Zaida Rodriguez OTR/L  "

## 2018-11-24 NOTE — PLAN OF CARE
Problem: Patient Care Overview  Goal: Plan of Care/Patient Progress Review  Outcome: No Change  Pt a/ox4 able to make needs known. Continues to be fatigued, loss of appetite and abdominal tenderness with palpation. UA neg. CT complete. Ortho statics complete. Power port accessed.

## 2018-11-24 NOTE — PROGRESS NOTES
"Patient has  [place] to Observation  order. Patient has been given the Observation brochure -  What does Observation mean to me.\"  Patient has been given the opportunity to ask questions about observation status and their plan of care.      aPm Peñaloza    "

## 2018-11-24 NOTE — PROGRESS NOTES
PRIMARY DIAGNOSIS: ACUTE PAIN  OUTPATIENT/OBSERVATION GOALS TO BE MET BEFORE DISCHARGE:  1. Pain Status: Improved-controlled with oral pain medications.    2. Return to near baseline physical activity: No    3. Cleared for discharge by consultants (if involved): No    Discharge Planner Nurse   Safe discharge environment identified: No  Barriers to discharge: Yes       Entered by: Erin Larson 11/24/2018 6:47 AM     Please review provider order for any additional goals.   Nurse to notify provider when observation goals have been met and patient is ready for discharge.

## 2018-11-24 NOTE — PROGRESS NOTES
Spiritual Health Assessment    Raphael is feeling spiritual/emotional distress. He is struggling to cope with his prognosis and the primary concern he presents is that he believes in God intellectually, but he doesn't feel God in his heart. He believes that a felt sense of spiritual connection would help him cope with his struggles. His father  of cancer at age 50 and Raphael was not informed about his father's prognosis until it was too late to say good-bye. He has sought therapy for the pain this caused. His father was a [employer] . Raphael has a daughter and he wonders if he'll be there for milestones in her life, such as getting her license and getting . 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 Although several congregations are praying for him, he is looking for a Muslim that aligns more with his values at this time.     Spiritual Care Empathetic listening; reflective conversation and bereavement support; life review; prayer.    Plan of Care Referral to staff  for follow up if Raphael is still in hospital on Monday.    Chaplain Sharda Sawyer M.Div.

## 2018-11-24 NOTE — PROGRESS NOTES
Discharge Planner   Discharge Plans in progress: Home with parents, Family discussing Home care vs PCA services  Barriers to discharge plan: Medical stability  Follow up plan: PCP       Entered by: Yudelka Huggins 11/24/2018 10:55 AM

## 2018-11-24 NOTE — PROGRESS NOTES
[facility] ADMISSION NOTE    Patient admitted to room 2302 at approximately 1715 via wheel chair from clinic. Patient was accompanied by transport tech, mother and stepfather.     Verbal SBAR report received from Glory Juarez RN prior to patient arrival.     Patient ambulated to bed with stand-by assist. Patient alert and oriented X 3. Pain is controlled without any medications. 0-10 Pain Scale: 2. Admission vital signs: Blood pressure 136/89, pulse 108, temperature 98.2  F (36.8  C), temperature source Oral, resp. rate 18, SpO2 93 %. Patient and mother and stepfather was oriented to plan of care, call light, bed controls, tv, telephone, bathroom and visiting hours.     Risk Assessment    The following safety risks were identified during admission: fall. Yellow risk band applied: YES.     Skin Initial Assessment    This writer admitted this patient and completed a full skin assessment and Denzel score in the Adult PCS flowsheet. Appropriate interventions initiated as needed.     Secondary skin check completed by Judith SIMPSON.    Skin  Inspection of bony prominences: Full  Skin WDL:  WDL except, characteristics (slight pink on coccyx, several pinpoint red marks-forehead)  Skin Temperature: warm  Skin Moisture: dry  Skin Elasticity: quick return to original state  Skin Integrity: scar(s), bruise(s) (pinpoint pink shelia on 3rd and 4th toe on Right foot)    Denzel Risk Assessment  Sensory Perception: 4-->no impairment  Moisture: 4-->rarely moist  Activity: 3-->walks occasionally  Mobility: 3-->slightly limited  Nutrition: 2-->probably inadequate  Friction and Shear: 3-->no apparent problem  Denzel Score: 19    Harleen Hdz

## 2018-11-25 ENCOUNTER — APPOINTMENT (OUTPATIENT)
Dept: CT IMAGING | Facility: CLINIC | Age: 38
End: 2018-11-25
Attending: FAMILY MEDICINE
Payer: COMMERCIAL

## 2018-11-25 ENCOUNTER — APPOINTMENT (OUTPATIENT)
Dept: OCCUPATIONAL THERAPY | Facility: CLINIC | Age: 38
End: 2018-11-25
Attending: FAMILY MEDICINE
Payer: COMMERCIAL

## 2018-11-25 PROBLEM — Z79.899 MEDICAL CANNABIS USE: Status: ACTIVE | Noted: 2018-11-25

## 2018-11-25 PROBLEM — E83.52 HYPERCALCEMIA: Status: ACTIVE | Noted: 2018-11-25

## 2018-11-25 LAB
ALBUMIN SERPL-MCNC: 2.3 G/DL (ref 3.4–5)
ALP SERPL-CCNC: 45 U/L (ref 40–150)
ALT SERPL W P-5'-P-CCNC: 16 U/L (ref 0–70)
ANION GAP SERPL CALCULATED.3IONS-SCNC: 8 MMOL/L (ref 3–14)
AST SERPL W P-5'-P-CCNC: 14 U/L (ref 0–45)
BILIRUB SERPL-MCNC: 0.2 MG/DL (ref 0.2–1.3)
BUN SERPL-MCNC: 28 MG/DL (ref 7–30)
CALCIUM SERPL-MCNC: 10.9 MG/DL (ref 8.5–10.1)
CHLORIDE SERPL-SCNC: 98 MMOL/L (ref 94–109)
CO2 SERPL-SCNC: 30 MMOL/L (ref 20–32)
CREAT SERPL-MCNC: 1.08 MG/DL (ref 0.66–1.25)
GFR SERPL CREATININE-BSD FRML MDRD: 76 ML/MIN/1.7M2
GLUCOSE BLDC GLUCOMTR-MCNC: 191 MG/DL (ref 70–99)
GLUCOSE BLDC GLUCOMTR-MCNC: 194 MG/DL (ref 70–99)
GLUCOSE BLDC GLUCOMTR-MCNC: 233 MG/DL (ref 70–99)
GLUCOSE BLDC GLUCOMTR-MCNC: 314 MG/DL (ref 70–99)
GLUCOSE BLDC GLUCOMTR-MCNC: 319 MG/DL (ref 70–99)
GLUCOSE SERPL-MCNC: 281 MG/DL (ref 70–99)
POTASSIUM SERPL-SCNC: 4 MMOL/L (ref 3.4–5.3)
PROT SERPL-MCNC: 5.8 G/DL (ref 6.8–8.8)
SODIUM SERPL-SCNC: 136 MMOL/L (ref 133–144)

## 2018-11-25 PROCEDURE — 99359 PROLONG SERV W/O CONTACT ADD: CPT | Performed by: NURSE PRACTITIONER

## 2018-11-25 PROCEDURE — 80053 COMPREHEN METABOLIC PANEL: CPT | Performed by: FAMILY MEDICINE

## 2018-11-25 PROCEDURE — 70450 CT HEAD/BRAIN W/O DYE: CPT

## 2018-11-25 PROCEDURE — 00000146 ZZHCL STATISTIC GLUCOSE BY METER IP

## 2018-11-25 PROCEDURE — 36415 COLL VENOUS BLD VENIPUNCTURE: CPT | Performed by: FAMILY MEDICINE

## 2018-11-25 PROCEDURE — 25000131 ZZH RX MED GY IP 250 OP 636 PS 637: Performed by: PHYSICIAN ASSISTANT

## 2018-11-25 PROCEDURE — G0378 HOSPITAL OBSERVATION PER HR: HCPCS

## 2018-11-25 PROCEDURE — 25000132 ZZH RX MED GY IP 250 OP 250 PS 637: Performed by: PHYSICIAN ASSISTANT

## 2018-11-25 PROCEDURE — 40000275 ZZH STATISTIC RCP TIME EA 10 MIN

## 2018-11-25 PROCEDURE — 25000128 H RX IP 250 OP 636: Performed by: FAMILY MEDICINE

## 2018-11-25 PROCEDURE — 25000132 ZZH RX MED GY IP 250 OP 250 PS 637: Performed by: FAMILY MEDICINE

## 2018-11-25 PROCEDURE — 12000000 ZZH R&B MED SURG/OB

## 2018-11-25 PROCEDURE — 97535 SELF CARE MNGMENT TRAINING: CPT | Mod: GO

## 2018-11-25 PROCEDURE — 25000128 H RX IP 250 OP 636: Performed by: PHYSICIAN ASSISTANT

## 2018-11-25 PROCEDURE — 99233 SBSQ HOSP IP/OBS HIGH 50: CPT | Performed by: FAMILY MEDICINE

## 2018-11-25 PROCEDURE — 40000133 ZZH STATISTIC OT WARD VISIT

## 2018-11-25 PROCEDURE — 99253 IP/OBS CNSLTJ NEW/EST LOW 45: CPT | Performed by: NURSE PRACTITIONER

## 2018-11-25 PROCEDURE — 99358 PROLONG SERVICE W/O CONTACT: CPT | Performed by: NURSE PRACTITIONER

## 2018-11-25 RX ORDER — PROCHLORPERAZINE MALEATE 10 MG
10 TABLET ORAL EVERY 6 HOURS PRN
Qty: 90 TABLET | Refills: 3 | Status: SHIPPED | OUTPATIENT
Start: 2018-11-25

## 2018-11-25 RX ORDER — DEXAMETHASONE 4 MG/1
8 TABLET ORAL 2 TIMES DAILY WITH MEALS
Qty: 120 TABLET | Refills: 1 | Status: SHIPPED | OUTPATIENT
Start: 2018-11-25

## 2018-11-25 RX ORDER — POLYETHYLENE GLYCOL 3350 17 G/17G
17 POWDER, FOR SOLUTION ORAL DAILY
Qty: 500 G | Refills: 3 | Status: SHIPPED | OUTPATIENT
Start: 2018-11-25

## 2018-11-25 RX ORDER — MIRTAZAPINE 15 MG/1
15 TABLET, FILM COATED ORAL AT BEDTIME
Qty: 30 TABLET | Refills: 3 | Status: SHIPPED | OUTPATIENT
Start: 2018-11-25

## 2018-11-25 RX ORDER — BISACODYL 5 MG
10 TABLET, DELAYED RELEASE (ENTERIC COATED) ORAL DAILY PRN
Qty: 60 TABLET | Refills: 3 | Status: SHIPPED | OUTPATIENT
Start: 2018-11-25

## 2018-11-25 RX ORDER — MIRTAZAPINE 15 MG/1
15 TABLET, FILM COATED ORAL AT BEDTIME
Status: DISCONTINUED | OUTPATIENT
Start: 2018-11-25 | End: 2018-11-25

## 2018-11-25 RX ORDER — FUROSEMIDE 10 MG/ML
40 INJECTION INTRAMUSCULAR; INTRAVENOUS EVERY 6 HOURS
Status: DISCONTINUED | OUTPATIENT
Start: 2018-11-25 | End: 2018-11-26 | Stop reason: DRUGHIGH

## 2018-11-25 RX ORDER — ONDANSETRON 4 MG/1
4 TABLET, ORALLY DISINTEGRATING ORAL EVERY 6 HOURS PRN
Qty: 120 TABLET | Refills: 3 | Status: SHIPPED | OUTPATIENT
Start: 2018-11-25

## 2018-11-25 RX ORDER — FUROSEMIDE 10 MG/ML
40 INJECTION INTRAMUSCULAR; INTRAVENOUS EVERY 6 HOURS
Status: DISCONTINUED | OUTPATIENT
Start: 2018-11-25 | End: 2018-11-25

## 2018-11-25 RX ORDER — DRONABINOL 2.5 MG/1
2.5 CAPSULE ORAL 2 TIMES DAILY
Qty: 60 CAPSULE | Refills: 0 | Status: SHIPPED | OUTPATIENT
Start: 2018-11-25 | End: 2018-11-28

## 2018-11-25 RX ORDER — AMOXICILLIN 250 MG
2 CAPSULE ORAL AT BEDTIME
Qty: 100 TABLET | Refills: 3 | Status: SHIPPED | OUTPATIENT
Start: 2018-11-25

## 2018-11-25 RX ADMIN — FUROSEMIDE 40 MG: 10 INJECTION, SOLUTION INTRAMUSCULAR; INTRAVENOUS at 23:59

## 2018-11-25 RX ADMIN — DRONABINOL 2.5 MG: 2.5 CAPSULE ORAL at 20:21

## 2018-11-25 RX ADMIN — INSULIN ASPART 7 UNITS: 100 INJECTION, SOLUTION INTRAVENOUS; SUBCUTANEOUS at 12:51

## 2018-11-25 RX ADMIN — LEVETIRACETAM 500 MG: 500 TABLET, FILM COATED ORAL at 20:20

## 2018-11-25 RX ADMIN — SODIUM CHLORIDE, POTASSIUM CHLORIDE, SODIUM LACTATE AND CALCIUM CHLORIDE: 600; 310; 30; 20 INJECTION, SOLUTION INTRAVENOUS at 06:08

## 2018-11-25 RX ADMIN — INSULIN ASPART 3 UNITS: 100 INJECTION, SOLUTION INTRAVENOUS; SUBCUTANEOUS at 08:34

## 2018-11-25 RX ADMIN — LEVETIRACETAM 500 MG: 500 TABLET, FILM COATED ORAL at 07:39

## 2018-11-25 RX ADMIN — FUROSEMIDE 40 MG: 10 INJECTION, SOLUTION INTRAMUSCULAR; INTRAVENOUS at 18:44

## 2018-11-25 RX ADMIN — BUSPIRONE HYDROCHLORIDE 10 MG: 10 TABLET ORAL at 07:39

## 2018-11-25 RX ADMIN — BUSPIRONE HYDROCHLORIDE 10 MG: 10 TABLET ORAL at 20:28

## 2018-11-25 RX ADMIN — SENNOSIDES AND DOCUSATE SODIUM 2 TABLET: 8.6; 5 TABLET ORAL at 20:13

## 2018-11-25 RX ADMIN — ONDANSETRON 4 MG: 2 INJECTION INTRAMUSCULAR; INTRAVENOUS at 08:34

## 2018-11-25 RX ADMIN — SODIUM CHLORIDE, POTASSIUM CHLORIDE, SODIUM LACTATE AND CALCIUM CHLORIDE: 600; 310; 30; 20 INJECTION, SOLUTION INTRAVENOUS at 16:06

## 2018-11-25 RX ADMIN — DEXAMETHASONE 8 MG: 4 TABLET ORAL at 07:39

## 2018-11-25 RX ADMIN — DRONABINOL 2.5 MG: 2.5 CAPSULE ORAL at 07:39

## 2018-11-25 RX ADMIN — INSULIN ASPART 6 UNITS: 100 INJECTION, SOLUTION INTRAVENOUS; SUBCUTANEOUS at 17:48

## 2018-11-25 RX ADMIN — ONDANSETRON 4 MG: 2 INJECTION INTRAMUSCULAR; INTRAVENOUS at 23:58

## 2018-11-25 RX ADMIN — SODIUM CHLORIDE, POTASSIUM CHLORIDE, SODIUM LACTATE AND CALCIUM CHLORIDE: 600; 310; 30; 20 INJECTION, SOLUTION INTRAVENOUS at 22:03

## 2018-11-25 RX ADMIN — SENNOSIDES AND DOCUSATE SODIUM 1 TABLET: 8.6; 5 TABLET ORAL at 07:39

## 2018-11-25 RX ADMIN — BENZONATATE 200 MG: 100 CAPSULE ORAL at 15:41

## 2018-11-25 RX ADMIN — PANTOPRAZOLE SODIUM 40 MG: 40 TABLET, DELAYED RELEASE ORAL at 06:08

## 2018-11-25 RX ADMIN — DEXAMETHASONE 8 MG: 4 TABLET ORAL at 20:21

## 2018-11-25 RX ADMIN — ATORVASTATIN CALCIUM 10 MG: 10 TABLET, FILM COATED ORAL at 20:28

## 2018-11-25 ASSESSMENT — ACTIVITIES OF DAILY LIVING (ADL): ADLS_ACUITY_SCORE: 13

## 2018-11-25 NOTE — PROGRESS NOTES
"CLINICAL NUTRITION SERVICES  -  ASSESSMENT NOTE     REASON FOR ASSESSMENT  Raphael Marin is a 38 year old male seen by Registered Dietitian for Admission Nutrition Risk Screen - reduced oral intake over the last month, unintentional weight loss of 10#s or more in the past two months      NUTRITION HISTORY  - Information obtained from the patient and his family. The patient reports that he has had a very poor appetite for the last one- two weeks, nothing tastes good to him. He is willing to try carnation instant breakfast for supper tonight with canned pears and a chocolate malt cup. He had tried Glucerna supplement at home. He is willing to try an orange magic cup tomorrow. He reports that he needs help eating because his right hand is not functioning well.      CURRENT NUTRITION ORDERS  Diet Order:     Regular    Current Intake/Tolerance:  25-50% of the few items on his meal tray, bites at time      PHYSICAL FINDINGS  Obtained from Chart/Interdisciplinary Team  None noted    ANTHROPOMETRICS  Height: Data Unavailable, per clinic note from care everywhere his ht is 5'9\"  Weight: 166 lbs 7.16 oz  - will check with his nurse to recheck this weight  There is no height or weight on file to calculate BMI.  Weight Status: unable to determine  IBW: 160#s  % IBW: unable to calculate at this time, need to recheck weight  Weight History:   Wt Readings from Last 10 Encounters:   11/25/18 75.5 kg (166 lb 7.2 oz)   11/09/18 105.7 kg (233 lb)         LABS  Labs reviewed    MEDICATIONS  Medications reviewed      ASSESSED NUTRITION NEEDS PER APPROVED PRACTICE GUIDELINES:    Dosing Weight  Unable to calculate need weight rechecked   Estimated Energy Needs: unable to calculate  Estimated Protein Needs: unable to calculate  Estimated Fluid Needs: unable to calculate    MALNUTRITION:  % Weight Loss:  Unable to determine at this time  % Intake:  </= 50% for >/= 5 days (severe malnutrition)  Subcutaneous Fat Loss:  None observed  Muscle " Loss:  Unable to assess at this time  Fluid Retention:  No lower extremity edema per the provider note    Malnutrition Diagnosis: Unable to determine due to need weight rechecked    NUTRITION DIAGNOSIS:  Inadequate protein-energy intake related to very poor appetite, taste changes from radiation as evidenced by the patient reports that he was eating very little for the last one to two weeks.      NUTRITION INTERVENTIONS  Recommendations / Nutrition Prescription  High calorie, high protein oral nutrition supplements- carnation instant breakfast made with whole milk, Boost Breeze - berry, magic cup - orange  .      Implementation  Nutrition education: Provided education on high calorie, high protein oral nutrition supplements  Composition of Meals and Snacks and Medical Food Supplement  .      Nutrition Goals  Patient to consume % of his oral nutrition supplements in 1-2 days.  .      MONITORING AND EVALUATION:  Progress towards goals will be monitored and evaluated per protocol and Practice Guidelines, Food intake and Liquid meal replacement or supplement intake    Mary Adams RD,LD  Clinical Dietitian

## 2018-11-25 NOTE — CONSULTS
[facility]    Palliative Care Consultation--Inpatient  Admission Date: 11/23/2018   Visit Date: November 25, 2018  PCP: Lul Guerrero   Requested by: Lloyd Canales MD      HISTORY of PRESENT ILLNESS:  Raphael Marin is a 38 year old year old male admitted with renal cell carcinoma discovered in May of this year; it was metastatic to lymph and lung at the time of diagnosis, and later metastasized to the brain.  He presented here on 11/23 with weakness, falls, anorexia, failure to thrive.  He is currently undergoing brain radiation with 4 additional treatments pending, and with plans to initiate Cabozantinib, a TKI, upon completion of radiation.  He was referred to Palliative Care for certification for medical cannabis .      ASSESSMENT & PLAN:    # Metastatic renal cell carcinoma (lung, brain, lymph)  # Anorexia, weakness, weight loss; mother reports appetite yesterday improved after start of Marinol 2.5 mg and Remeron at HS  > certified Raphael as eligible for medical cannabis    # Constipation  > upon discharge, replace colace with Senna, and continue daily Miralax.  PRN Bisacodyl.     # Recurrent Nausea; has no antiemetic Rx's at home.  Was given 2 doses Zofran yesterday, and one today thus far.   > see Discharge instructions    # Advance Care Planning:  > Code status:  Full Code  > Health Care Directive: NO  > POLST:  No    # Goals/Dispo:  Wants to go home so he can resume radiation and start oral chemo upon completion of radiation, as previously planned      Thank you for the opportunity to be of service to this patient and family.      Shane Guerrero CNP ([place])  Palliative Care  Private cell:  [phone]     Face to face:   1040 - 1130, 50 min, > 50% spent discussing  appropriate use of medications for symptom management.   Non face to face:   0313-3512 and 1130 - 1330, 130 min, > 50% spent reviewing and writing prescriptions in anticipation of discharge, coordinating care with  attending,  nursing and Care Transitions and writing detailed discharge instructions.       Discharge Instructions:  From Shane Guerrero, Palliative Care NP, Cell [phone]     Medical Cannabis    Here is the main web site for information on medical cannabis:  [email or web address]     The web site notes that it may take UP TO 30 DAYS for Raphael to hear from the [place] Department of Health notifying him that his registration has been accepted, and to advise of next steps.  Please spend a LOT of time reviewing information for patients on this web site.  It will explain the precautions, locations of dispensaries, etc.  If you investigate the links to particular dispensaries, you will find general information on costs.    Only time and experimentation will tell if and how much this helps Raphael's appetite; the pharmacists who work at the dispensary can also give you information on what they've learned from other patients treated for the same symptom.     Constipation    To prevent constipation, take 1 cap of Miralax in fluid daily PLUS 2 Senna tabs at bedtime.      Stop the docusate; there is no proof that it works.    If the above doses of Miralax and Senna don't result in a BM at least every other day, you may increase Miralax to twice daily and Senna up to 4 tabs twice daily.  You can adjust these on your own.  Aim for a standard dose that you take every single day.    If you need an extra boost, Raphael may take 2-3 Bisacodyl 5 mg tabs daily; they usually work within about 4 hours.  Take the Bisacodyl ONLY as NEEDED, not daily.    Nausea    I sent in two different prescriptions for anti-nausea meds:  Ondansetron and Prochlorperazine.  They work in different ways and sometimes people need to take both.  While Raphael was here, he was given Ondansetron.  If that worked here, it should work at home, so start with that drug.  If it doesn't relieve nausea within 1 hour, take the other drug.   "    Keep track (record the date, time, drug and dose) of each anti-nausea med that Raphael takes on a day by day basis.      Appetite    Dronabinol = Marinol, two different names for the same medicine.  Raphael will be discharged on the 2.5 mg dose.  The dose CAN be increased.  This is a controlled drug and requires a \"hard copy\" prescription with a provider's original signature.  This is synthetic marijuana/THC.      Pain    Poorly controlled pain can deplete one's energy.  I hope Raphael's constipation is sufficiently controlled that he'll be willing to take Norco for pain when he needs it. Also keep track of each dose of Norco taken--date, time, dose.      Please call me if you have other questions.      I've told the Discharge Planner that you would like Raphael to receive home-based palliative care, including physical therapy.  She will ask for [facility] to deliver this service.      Please remember to go to the [facility] pharmacy before you leave to  the prescription for Marinol/Dronabinol.  All the other prescriptions were sent to [facility] in [place].     Best wishes to you all!    = = = = = = = = = = = = = = = =      PROBLEM LIST  Patient Active Problem List   Diagnosis     Type 2 diabetes mellitus treated without insulin (H)     Primary clear cell carcinoma of kidney, left (H)     Essential hypertension with goal blood pressure less than 130/80     Anxiety disorder due to general medical condition     Failure to thrive in adult     Abdominal pain, generalized     Medical cannabis--Certified eligible on 11/25/18       PAST MEDICAL HISTORY  Past Medical History:   Diagnosis Date     Brain metastases (H)     resection 11/1/18 [place]/Care Everywhere     Renal cell carcinoma (H)     s/p left nephrectomy       PAST SURGICAL HISTORY  Past Surgical History:   Procedure Laterality Date     BRAIN TUMOR RESECTION Right 11/01/2018     NEPHRECTOMY Left 06/2018       FAMILY MEDICAL HISTORY  Family " History   Problem Relation Age of Onset     Retinal detachment Mother      Cataracts Mother      Pancreatic Cancer Father      Diabetes Paternal Grandmother        SOCIAL HISTORY  History   Smoking Status     Former Smoker     Types: Cigarettes     Quit date:    Smokeless Tobacco     Never Used       Alcohol use No     History   Drug Use No     Social History     Social History Narrative    2018:  History provided by mother.  Never , has a teenage daughter who does NOT live with patient.  Patient is now living with his mother and stepfather.  Patient's biological father has .  Former smoker.  Has medical assistance.     Shane Guerrero CNP ([place])    Palliative Care    [facility] cell:  [phone]        SPIRITUAL HISTORY  deferred    ALLERGIES  No Known Allergies    MEDICATIONS  Medications Prior to Admission    No current facility-administered medications on file prior to encounter.   Current Outpatient Prescriptions on File Prior to Encounter:  atorvastatin (LIPITOR) 10 MG tablet Take 10 mg by mouth daily   benzonatate (TESSALON) 200 MG capsule Take 200 mg by mouth 3 times daily as needed for cough    busPIRone (BUSPAR) 10 MG tablet Take 10 mg by mouth 2 times daily   HYDROcodone-acetaminophen (NORCO) 5-325 MG per tablet Take 1-2 tablets by mouth every 4 hours as needed for moderate to severe pain    levETIRAcetam (KEPPRA) 500 MG tablet Take 500 mg by mouth 2 times daily   metFORMIN (GLUCOPHAGE-XR) 500 MG 24 hr tablet Take 1,000 mg by mouth 2 times daily (before meals)   pantoprazole (PROTONIX) 40 MG EC tablet Take 40 mg by mouth daily   order for DME Equipment being ordered: Hospital BedRequesting hospital bed for living room on main floor. 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 Dizziness, fall risk, fatigue/malaise, difficulty navigating stairs due to brain mets. Also CERON/SOB affects mobility around home especially up and down stairs.    [DISCONTINUED] dexamethasone (DECADRON) 4 MG tablet Take 1 tablet (4 mg) by mouth every 8 hours (Patient taking differently: Take 4 mg by mouth every 6 hours )       Current Medications    atorvastatin  10 mg Oral At Bedtime     busPIRone  10 mg Oral BID     dexamethasone  8 mg Oral Q12H MONICA     dronabinol  2.5 mg Oral BID     heparin  5 mL Intracatheter Q28 Days     heparin lock flush  5-10 mL Intracatheter Q24H     insulin aspart  1-10 Units Subcutaneous TID AC     insulin aspart  1-7 Units Subcutaneous At Bedtime     levETIRAcetam  500 mg Oral BID     mirtazapine  15 mg Oral At Bedtime     pantoprazole  40 mg Oral QAM AC     senna-docusate  1 tablet Oral BID    Or     senna-docusate  2 tablet Oral BID       PRN Medications  acetaminophen, acetaminophen, albuterol, benzonatate, bisacodyl **OR** bisacodyl **OR** bisacodyl, glucose **OR** dextrose **OR** glucagon, heparin lock flush, HYDROcodone-acetaminophen, HYDROmorphone, lidocaine 4%, lidocaine 4%, lidocaine (buffered or not buffered), magnesium citrate, melatonin, naloxone, ondansetron **OR** ondansetron, polyethylene glycol, prochlorperazine **OR** prochlorperazine **OR** prochlorperazine, sodium chloride (PF), sodium chloride (PF)      REVIEW OF SYSTEMS  Unable due to somnolence.  Mother reports last BM was 2d ago with a recent h/o constipation, so bothersome that he has avoided the use of Norco even when his abdominal pain has been quite severe.  Has no anti-emetic Rx's at home.  Yesterday, after the start of Marinol, he ate well for a change.  Significant weight loss as noted below.  Recent h/o falls at home; his mother and step father moved his bed to the first floor recently, as their only bathroom is on the first floor.        PHYSICAL EXAMINATION  Patient Vitals for the past 24 hrs:   BP Temp Temp src Pulse Resp SpO2 Weight   11/25/18 1100 165/70 97.9  F (36.6  C) Oral 89 18 92 % -   11/25/18 0740 160/80 97.7  F (36.5  C) Oral 93 18 93 % -   11/25/18  0607 - - - - - - 166 lb 7.2 oz (75.5 kg)   11/25/18 0302 135/78 97.8  F (36.6  C) Oral 93 18 90 % -   11/24/18 2319 142/72 99  F (37.2  C) Oral 100 18 94 % -   11/24/18 1927 123/66 99.1  F (37.3  C) Oral 104 18 93 % -   11/24/18 1604 131/76 97.9  F (36.6  C) Oral 94 20 91 % -      Wt Readings from Last 10 Encounters:   11/25/18 166 lb 7.2 oz (75.5 kg)   11/09/18 233 lb (105.7 kg)   5/21/18                128.4 kg (283 lb)  3/30/15                131.5 kg (289 lb 12.8 oz)    Constitutional:  Obese, somnolent male in no apparent distress   Eyes:  anicteric  Cardiovascular:  RRR  Respiratory:  Faint crackles in bases  Genitourinary:  deferred  Musculoskeletal:  Spontaneous movement of all extremities w/o limitations  Skin:  dry  Neurological:  Nonfocal, w/o seizures/tremors  Psych:  somnolent      DATA  ROUTINE ICU LABS (Last four results)  CMP    Recent Labs  Lab 11/23/18  1801      POTASSIUM 4.5   CHLORIDE 101   CO2 24   ANIONGAP 11   *   BUN 33*   CR 1.10   GFRESTIMATED 74   GFRESTBLACK >90   MED 12.5*     CBC    Recent Labs  Lab 11/23/18  1801   WBC 11.1*   RBC 5.34   HGB 15.2   HCT 45.8   MCV 86   MCH 28.5   MCHC 33.2   RDW 12.9        INRNo lab results found in last 7 days.  Arterial Blood GasNo lab results found in last 7 days.      Recent Results (from the past 48 hour(s))   CT Chest/Abdomen/Pelvis w Contrast    Narrative    CT CHEST, ABDOMEN, AND PELVIS WITH CONTRAST 11/23/2018 10:24 PM     HISTORY: Abdominal pain, chronic cough, known renal cell carcinoma  with mets to lung and brain.    COMPARISON: CT chest from August 27, 2018, CT chest, abdomen, pelvis  from August 8, 2018    TECHNIQUE: Volumetric helical acquisition of CT images from the lung  apices through the symphysis pubis after the administration of 100 mL  Isovue -370 intravenous contrast. Radiation dose for this scan was  reduced using automated exposure control, adjustment of the mA and/or  kV according to patient size, or  iterative reconstruction technique.    FINDINGS:     Chest: Multiple bilateral pulmonary masses noted. No pleural or  pericardial effusion. Extensive mediastinal and bilateral hilar  adenopathy. All of these findings are significantly progressed since  the comparison study. Normal caliber aorta. Normal heart size.    Abdomen and pelvis: Mass in the left renal fossa measures 9.1 x 8.3 cm  today. Previously this measured 5.4 x 5.0 cm. Decreased retrograde  peritoneal adenopathy since the comparison study. Small amount of  pelvic free fluid. No bowel obstruction. No hydronephrosis. Liver,  gallbladder, spleen, and pancreas demonstrate no worrisome focal  lesion. Multiple nodules in the right adrenal gland are increased in  size compatible with worsening metastatic disease.     Survey of the visualized bony structures demonstrates no destructive  bony lesions.      Impression    IMPRESSION:  1. Increase in size and number of pulmonary masses and adenopathy  compatible with worsening metastatic disease in the chest.  2. Enlarging left renal fossa mass and retroperitoneal adenopathy  compatible with worsening disease.  3. Otherwise no acute process demonstrated in the chest, abdomen, and  pelvis.    ROMA SOLIMAN MD

## 2018-11-25 NOTE — PROGRESS NOTES
Pt seen on 11/24 but inadvertently not documented.   We recommended remeron and marinol for the anorexia and early satiety.

## 2018-11-25 NOTE — PLAN OF CARE
Problem: Patient Care Overview  Goal: Plan of Care/Patient Progress Review  Occupational Therapy Discharge Summary    Reason for therapy discharge:    Discharged to home with assist from family and palliative care services    Progress towards therapy goal(s). See goals on Care Plan in [facility] electronic health record for goal details.  Goals met  Provided education to Pt and family regarding AE to increase safety at home. Recommend commode for upper level, and tub tx bench. Pt's parents given AE handout and info regarding free loan programs in the area. Educated on use of AE, as well as walker safety.     Therapy recommendation(s):    No further therapy is recommended.

## 2018-11-25 NOTE — PROGRESS NOTES
Physical Therapy Discharge Summary    Reason for therapy discharge:    Discharged to home. with assist from palliative care     Progress towards therapy goal(s). See goals on Care Plan in [facility] electronic health record for goal details.  Goals partially met.  Barriers to achieving goals:   limited tolerance for therapy and discharge from facility.    Therapy recommendation(s):    Continued therapy is recommended.  Rationale/Recommendations:  to build up function capacity .       Please Contact me with any questions or concerns. Thank you for for patience and cooperation.     Mike Delgadillo PT, DPT  Flexible Workforce Physical Therapist   [facility]  [email or web address]

## 2018-11-25 NOTE — PROGRESS NOTES
SUBJECTIVE:   Weaker again today  Very sleepy, mostly thru the night but even at 5 pm  Family has noted some increased right hand weakness even prior to admission but were told to watch for this a sign of ICH     ROS:4 point ROS including Respiratory, CV, GI and , other than that noted in the HPI,  is negative   Did eat better after the marinol last kilo but worse again today with significant early satiety    OBJECTIVE:   /71 (BP Location: Left arm)  Pulse 109  Temp 98.3  F (36.8  C) (Oral)  Resp 18  Wt 75.5 kg (166 lb 7.2 oz)  SpO2 92%    GENERAL APPEARANCE:  Weak, sleepy, more so than yesterday.      RESP:clear      CV: regular rate and rhythm,  No  murmur , edema: none       Abdomen: soft, nontender, no liver or spleen enlargement, no masses, BSs normal   Skin: no cyanosis, pallor, or jaundice    CMP  Recent Labs  Lab 11/25/18  1659 11/23/18  1801    136   POTASSIUM 4.0 4.5   CHLORIDE 98 101   CO2 30 24   ANIONGAP 8 11   * 239*   BUN 28 33*   CR 1.08 1.10   GFRESTIMATED 76 74   GFRESTBLACK >90 >90   MED 10.9* 12.5*   PROTTOTAL 5.8*  --    ALBUMIN 2.3*  --    BILITOTAL 0.2  --    ALKPHOS 45  --    AST 14  --    ALT 16  --      CBC  Recent Labs  Lab 11/23/18  1801   WBC 11.1*   RBC 5.34   HGB 15.2   HCT 45.8   MCV 86   MCH 28.5   MCHC 33.2   RDW 12.9        INRNo lab results found in last 7 days.  Arterial BloodGas  No lab results found in last 7 days.   Venous Blood Gas  No lab results found in last 7 days.    Medications     atorvastatin  10 mg Oral At Bedtime     busPIRone  10 mg Oral BID     dexamethasone  8 mg Oral Q12H MONICA     dronabinol  2.5 mg Oral BID     heparin  5 mL Intracatheter Q28 Days     heparin lock flush  5-10 mL Intracatheter Q24H     insulin aspart  1-10 Units Subcutaneous TID AC     insulin aspart  1-7 Units Subcutaneous At Bedtime     levETIRAcetam  500 mg Oral BID     pantoprazole  40 mg Oral QAM AC     senna-docusate  1 tablet Oral BID    Or      senna-docusate  2 tablet Oral BID       Intake/Output Summary (Last 24 hours) at 11/25/18 1800  Last data filed at 11/25/18 1732   Gross per 24 hour   Intake             3190 ml   Output             1675 ml   Net             1515 ml         ASSESSMENT: PLAN:   Raphael Marin is a 38 year old male with a past medical history significant for renal cell carcinoma with mets to brain and lung s/p left nephrectomy and brain tumor resection, type 2 diabetes mellitus, hypertension, dyslipidemia, and anxiety who presents on 11/23/2018 with poor oral intake, generalized weakness, falls - directly admitted for evaluation of failure to thrive.        Failure to thrive in adult  Overall not doing well since starting whole brain radiation. Poor oral intake, dehydrated, generally weak. Family is concerned about his safety at home while they are gone at work. Patient was a direct admit request from Dr. Quijano (radiation oncologist) for further evaluation. Basic labs relatively unremarkable, VSS. Low evidence for infection. Work-up still in process.  -  ml bolus, followed by maintenance @ 125 ml/hr  - Social work consult to help obtain resources such as home health care  - PT/OT evaluations  - Nutrition consult to help address poor appetite        Abdominal pain, generalized  Poor oral intake  Postprandial fullness  Patient with inconsistent bowel movements, nausea, and vomiting. Develops postprandial fullness after just a few bites of food. Afebrile, minimal leukocytosis (WBC 11.1) upon admission. Tender on exam but not an acute abdomen. Concern for possible small bowel obstruction or other pathologic finding.  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    -some improvement last kilo with marinol, but may have increased sedation from the remeron, and will stop the remeron and if we want to  "try it again start at 7.5mg tomorrow evening.    -Palliative saw today and qualified him for medical Marijuana        Hypercalcemia   -12.5 on admission and not treated except some IVF  -down to 10.9 evening of 11/25/2018 but corrected for albumen was 12.3  -started back on IVF and will add lasix.    -waiting on ionized calcium and if elevated would consider zometa tomorrow AM.    -sent off PTH and PTHrH but those will take time.   -no bony mets seen on CTs so suspect this will be from elevated PTHrH.     Brain mets with generalized edema  -CT head done tonight   1. Interval right frontal craniotomy for tumor resection.  2. Increasing areas of vasogenic edema in both hemispheres with  associated underlying lesions. The pattern suggests progressive  metastatic disease. Clinical correlation suggested.  3. No evidence for intracranial hemorrhage or midline shift.   -suspect most of this edema is from the radiation but would talk to Dr Quijano tomorrow to get his opinion.      Generalized weakness, risk for falls  Will be alone all day starting this week while family is at work. Family concerned about patient's safety with his weakness. No focal weakness on exam. Brain mets likely affecting patient's balance and overall function. Recently had MRI at [facility] on 11/2/18 - low concern for CVA or other acute abnormality, no need to repeat imaging.   - PT/OT evaluations  - SW consult, would likely benefit from home health care  - very weak.  Needs a lot of support.          Cough   Chronic cough. Known lung mets. Chest CT on 11/14 [facility] with \"numerous areas of apparent lung consolidation... Likely related to metastases although slightly atypical.\" Afebrile, WBC 11.1, no hypoxia - low suspicion for pneumonia. Cough managed prior to admission with Tessalon and albuterol without much relief.  - Continue Tessalon  - CT chest/abdomen/pelvis pending  - Prn albuterol neb available        Tachycardia   Mild, rates in low 100's. " Appears to have baseline heart rate in low 100's.  - Stable, observe        Primary clear cell carcinoma of kidney, left  Mets to brain and lung  Diagnosed May 2018. S/p left nephrectomy in June 2018, s/p brain tumor resection on 11/1/18 - all through [facility] [place]. Follows with oncology Dr. Rodriguez through [facility], but is doing radiation at [facility], which is closer to home. On day of admission (11/23/2018) he received round 6 of 10 of whole brain radiation. Has been prescribed carozantinib for RCC but has not yet started taking it. Receives decadron 4 mg q 6 hours prior to admission for radiation induced inflammation. On Keppra prior to admission.  - Hold prior to admission carozantinib  - Continue prior to admission decadron 4 mg q 6 hours  - Continue prior to admission Keppra  - Continue with outpatient management        Type 2 diabetes mellitus treated without insulin  Managed with metformin 1000 mg bid. Admit glucose = 239.  - HgbA1c pending  - Hold prior to admission metformin  - High dose sliding scale insulin  - Hyper/hypoglycemia protocol        Essential hypertension with goal blood pressure less than 130/80  Pressures reviewed, stable. Not on antihypertensive medications prior to admission.  - Observe off of medications for now        Anxiety disorder due to general medical condition  Stable mood upon admission. Managed prior to admission with Buspar, continue.        Fluids:  ml  Bolus, then maintenance @ 125  Electrolytes: Monitor  Nutrition: Clear liquid     DVT Prophylaxis: Pneumatic Compression Devices  Code Status: Full Code - discussed directly with patient and his family    Dispo:   Will need increased care at home, thru home care and the county.  Shane SHORE looking into this  Wants to change Oncology to down here from [facility] since he lives here now.  Would try to set this up tomorrow.   Needs treatment for the elevated Ca++ as noted   Unclear how much of this weakness and anorexia is  total tumor burden vs brain swelling vs hypercalcemia.

## 2018-11-25 NOTE — PLAN OF CARE
Problem: Patient Care Overview  Goal: Plan of Care/Patient Progress Review  Patient eating dinner, the most he has eating for days per his mother. Marinol was started earlier this shift. Zofran also administered prior to eating. Patient uses call light to notify staff with questions or when he wants to walk.

## 2018-11-25 NOTE — PROGRESS NOTES
Discharge Planner   Discharge Plans in progress: [facility]  Barriers to discharge plan: Medical stability  Follow up plan: PCP, [facility]       Entered by: Yudelka Huggins 2018 2:53 PM       HOME CARE HAND OFF  Patient Name: Raphael Marin    MRN: [id number]    : 1980    Admit Diagnosis: failure to thrive,   weakness  Failure to thrive (0-17)  Failure to thrive in adult      Services Pt Needs at Home: PT and Other: Palliative home care    Discharge Support: Family/Friend Support    Living Arrangements: With family     or Address Other Than Pt: No    Wound Care: No    Anticipate DC Date: 2018

## 2018-11-25 NOTE — PLAN OF CARE
Problem: Patient Care Overview  Goal: Plan of Care/Patient Progress Review  Outcome: Improving  Pt has been tired, requesting to sleep and have cares clustered as much as possible. Shows generalized weakness when moving in bed or up to sitting position off side of bed, RUE especially. C/o flank pain once, that bed is uncomfortable, repositioned w/pillow support, warm packs and Tylenol given. Glucose has been in 200's, receiving decadron.

## 2018-11-26 ENCOUNTER — APPOINTMENT (OUTPATIENT)
Dept: CT IMAGING | Facility: CLINIC | Age: 38
End: 2018-11-26
Attending: PHYSICIAN ASSISTANT
Payer: COMMERCIAL

## 2018-11-26 ENCOUNTER — APPOINTMENT (OUTPATIENT)
Dept: GENERAL RADIOLOGY | Facility: CLINIC | Age: 38
End: 2018-11-26
Attending: PHYSICIAN ASSISTANT
Payer: COMMERCIAL

## 2018-11-26 LAB
ALBUMIN SERPL-MCNC: 2.5 G/DL (ref 3.4–5)
ALBUMIN SERPL-MCNC: 2.6 G/DL (ref 3.4–5)
ALP SERPL-CCNC: 47 U/L (ref 40–150)
ALP SERPL-CCNC: 49 U/L (ref 40–150)
ALT SERPL W P-5'-P-CCNC: 18 U/L (ref 0–70)
ALT SERPL W P-5'-P-CCNC: 18 U/L (ref 0–70)
ANION GAP SERPL CALCULATED.3IONS-SCNC: 7 MMOL/L (ref 3–14)
ANION GAP SERPL CALCULATED.3IONS-SCNC: 9 MMOL/L (ref 3–14)
AST SERPL W P-5'-P-CCNC: 16 U/L (ref 0–45)
AST SERPL W P-5'-P-CCNC: 17 U/L (ref 0–45)
BASE EXCESS BLDV CALC-SCNC: 11 MMOL/L
BASOPHILS # BLD AUTO: 0 10E9/L (ref 0–0.2)
BASOPHILS NFR BLD AUTO: 0.1 %
BILIRUB SERPL-MCNC: 0.4 MG/DL (ref 0.2–1.3)
BILIRUB SERPL-MCNC: 0.4 MG/DL (ref 0.2–1.3)
BUN SERPL-MCNC: 24 MG/DL (ref 7–30)
BUN SERPL-MCNC: 25 MG/DL (ref 7–30)
CA-I BLD-MCNC: 6.6 MG/DL (ref 4.4–5.2)
CALCIUM SERPL-MCNC: 11.1 MG/DL (ref 8.5–10.1)
CALCIUM SERPL-MCNC: 11.7 MG/DL (ref 8.5–10.1)
CHLORIDE SERPL-SCNC: 93 MMOL/L (ref 94–109)
CHLORIDE SERPL-SCNC: 97 MMOL/L (ref 94–109)
CO2 SERPL-SCNC: 33 MMOL/L (ref 20–32)
CO2 SERPL-SCNC: 34 MMOL/L (ref 20–32)
CREAT SERPL-MCNC: 1.15 MG/DL (ref 0.66–1.25)
CREAT SERPL-MCNC: 1.19 MG/DL (ref 0.66–1.25)
DIFFERENTIAL METHOD BLD: ABNORMAL
EOSINOPHIL # BLD AUTO: 0 10E9/L (ref 0–0.7)
EOSINOPHIL NFR BLD AUTO: 0.2 %
ERYTHROCYTE [DISTWIDTH] IN BLOOD BY AUTOMATED COUNT: 12.7 % (ref 10–15)
ERYTHROCYTE [DISTWIDTH] IN BLOOD BY AUTOMATED COUNT: 12.8 % (ref 10–15)
GFR SERPL CREATININE-BSD FRML MDRD: 68 ML/MIN/1.7M2
GFR SERPL CREATININE-BSD FRML MDRD: 71 ML/MIN/1.7M2
GLUCOSE BLDC GLUCOMTR-MCNC: 178 MG/DL (ref 70–99)
GLUCOSE BLDC GLUCOMTR-MCNC: 194 MG/DL (ref 70–99)
GLUCOSE BLDC GLUCOMTR-MCNC: 253 MG/DL (ref 70–99)
GLUCOSE BLDC GLUCOMTR-MCNC: 301 MG/DL (ref 70–99)
GLUCOSE SERPL-MCNC: 244 MG/DL (ref 70–99)
GLUCOSE SERPL-MCNC: 256 MG/DL (ref 70–99)
HCO3 BLDV-SCNC: 36 MMOL/L (ref 21–28)
HCT VFR BLD AUTO: 41.7 % (ref 40–53)
HCT VFR BLD AUTO: 43.5 % (ref 40–53)
HGB BLD-MCNC: 13.9 G/DL (ref 13.3–17.7)
HGB BLD-MCNC: 14.2 G/DL (ref 13.3–17.7)
IMM GRANULOCYTES # BLD: 0.1 10E9/L (ref 0–0.4)
IMM GRANULOCYTES NFR BLD: 0.6 %
LYMPHOCYTES # BLD AUTO: 0.5 10E9/L (ref 0.8–5.3)
LYMPHOCYTES NFR BLD AUTO: 5.5 %
MCH RBC QN AUTO: 28 PG (ref 26.5–33)
MCH RBC QN AUTO: 28.5 PG (ref 26.5–33)
MCHC RBC AUTO-ENTMCNC: 32.6 G/DL (ref 31.5–36.5)
MCHC RBC AUTO-ENTMCNC: 33.3 G/DL (ref 31.5–36.5)
MCV RBC AUTO: 86 FL (ref 78–100)
MCV RBC AUTO: 86 FL (ref 78–100)
MONOCYTES # BLD AUTO: 0.3 10E9/L (ref 0–1.3)
MONOCYTES NFR BLD AUTO: 3 %
MRSA DNA SPEC QL NAA+PROBE: NEGATIVE
NEUTROPHILS # BLD AUTO: 8.6 10E9/L (ref 1.6–8.3)
NEUTROPHILS NFR BLD AUTO: 90.6 %
NRBC # BLD AUTO: 0 10*3/UL
NRBC BLD AUTO-RTO: 0 /100
NT-PROBNP SERPL-MCNC: 83 PG/ML (ref 0–450)
PCO2 BLDV: 49 MM HG (ref 40–50)
PH BLDV: 7.48 PH (ref 7.32–7.43)
PLATELET # BLD AUTO: 182 10E9/L (ref 150–450)
PLATELET # BLD AUTO: 190 10E9/L (ref 150–450)
PO2 BLDV: 34 MM HG (ref 25–47)
POTASSIUM SERPL-SCNC: 3.5 MMOL/L (ref 3.4–5.3)
POTASSIUM SERPL-SCNC: 4 MMOL/L (ref 3.4–5.3)
PROT SERPL-MCNC: 6.2 G/DL (ref 6.8–8.8)
PROT SERPL-MCNC: 6.6 G/DL (ref 6.8–8.8)
PTH-INTACT SERPL-MCNC: 9 PG/ML (ref 18–80)
RBC # BLD AUTO: 4.87 10E12/L (ref 4.4–5.9)
RBC # BLD AUTO: 5.08 10E12/L (ref 4.4–5.9)
SODIUM SERPL-SCNC: 136 MMOL/L (ref 133–144)
SODIUM SERPL-SCNC: 137 MMOL/L (ref 133–144)
SPECIMEN SOURCE: NORMAL
WBC # BLD AUTO: 10.1 10E9/L (ref 4–11)
WBC # BLD AUTO: 9.5 10E9/L (ref 4–11)

## 2018-11-26 PROCEDURE — 80053 COMPREHEN METABOLIC PANEL: CPT | Performed by: PHYSICIAN ASSISTANT

## 2018-11-26 PROCEDURE — 20000003 ZZH R&B ICU

## 2018-11-26 PROCEDURE — 87641 MR-STAPH DNA AMP PROBE: CPT | Performed by: PHYSICIAN ASSISTANT

## 2018-11-26 PROCEDURE — 94640 AIRWAY INHALATION TREATMENT: CPT

## 2018-11-26 PROCEDURE — 25000128 H RX IP 250 OP 636: Performed by: PHYSICIAN ASSISTANT

## 2018-11-26 PROCEDURE — 94660 CPAP INITIATION&MGMT: CPT

## 2018-11-26 PROCEDURE — 25000128 H RX IP 250 OP 636: Performed by: FAMILY MEDICINE

## 2018-11-26 PROCEDURE — 71046 X-RAY EXAM CHEST 2 VIEWS: CPT

## 2018-11-26 PROCEDURE — 82542 COL CHROMOTOGRAPHY QUAL/QUAN: CPT | Performed by: PHYSICIAN ASSISTANT

## 2018-11-26 PROCEDURE — 85025 COMPLETE CBC W/AUTO DIFF WBC: CPT | Performed by: PHYSICIAN ASSISTANT

## 2018-11-26 PROCEDURE — 40000281 ZZH STATISTIC TRANSPORT TIME EA 15 MIN

## 2018-11-26 PROCEDURE — 25000128 H RX IP 250 OP 636

## 2018-11-26 PROCEDURE — 87640 STAPH A DNA AMP PROBE: CPT | Performed by: PHYSICIAN ASSISTANT

## 2018-11-26 PROCEDURE — 82803 BLOOD GASES ANY COMBINATION: CPT | Performed by: PHYSICIAN ASSISTANT

## 2018-11-26 PROCEDURE — 71260 CT THORAX DX C+: CPT

## 2018-11-26 PROCEDURE — 25000125 ZZHC RX 250: Performed by: PHYSICIAN ASSISTANT

## 2018-11-26 PROCEDURE — 40000270 ZZH STATISTIC OXYGEN  O2DAILY TECH TIME

## 2018-11-26 PROCEDURE — 83970 ASSAY OF PARATHORMONE: CPT | Performed by: PHYSICIAN ASSISTANT

## 2018-11-26 PROCEDURE — 25000131 ZZH RX MED GY IP 250 OP 636 PS 637: Performed by: PHYSICIAN ASSISTANT

## 2018-11-26 PROCEDURE — 83880 ASSAY OF NATRIURETIC PEPTIDE: CPT | Performed by: PHYSICIAN ASSISTANT

## 2018-11-26 PROCEDURE — 25000132 ZZH RX MED GY IP 250 OP 250 PS 637: Performed by: FAMILY MEDICINE

## 2018-11-26 PROCEDURE — 40000275 ZZH STATISTIC RCP TIME EA 10 MIN

## 2018-11-26 PROCEDURE — 82330 ASSAY OF CALCIUM: CPT | Performed by: FAMILY MEDICINE

## 2018-11-26 PROCEDURE — 25000132 ZZH RX MED GY IP 250 OP 250 PS 637: Performed by: PHYSICIAN ASSISTANT

## 2018-11-26 PROCEDURE — 00000146 ZZHCL STATISTIC GLUCOSE BY METER IP

## 2018-11-26 PROCEDURE — 85027 COMPLETE CBC AUTOMATED: CPT | Performed by: FAMILY MEDICINE

## 2018-11-26 PROCEDURE — 25000132 ZZH RX MED GY IP 250 OP 250 PS 637

## 2018-11-26 PROCEDURE — 99291 CRITICAL CARE FIRST HOUR: CPT

## 2018-11-26 PROCEDURE — 25000125 ZZHC RX 250: Performed by: FAMILY MEDICINE

## 2018-11-26 RX ORDER — IOPAMIDOL 755 MG/ML
80 INJECTION, SOLUTION INTRAVASCULAR ONCE
Status: COMPLETED | OUTPATIENT
Start: 2018-11-26 | End: 2018-11-26

## 2018-11-26 RX ORDER — ZOLEDRONIC ACID 0.04 MG/ML
4 INJECTION, SOLUTION INTRAVENOUS ONCE
Status: COMPLETED | OUTPATIENT
Start: 2018-11-26 | End: 2018-11-26

## 2018-11-26 RX ORDER — NALOXONE HYDROCHLORIDE 0.4 MG/ML
.1-.4 INJECTION, SOLUTION INTRAMUSCULAR; INTRAVENOUS; SUBCUTANEOUS
Status: DISCONTINUED | OUTPATIENT
Start: 2018-11-26 | End: 2018-11-29 | Stop reason: HOSPADM

## 2018-11-26 RX ORDER — FUROSEMIDE 10 MG/ML
40 INJECTION INTRAMUSCULAR; INTRAVENOUS EVERY 4 HOURS
Status: COMPLETED | OUTPATIENT
Start: 2018-11-26 | End: 2018-11-26

## 2018-11-26 RX ORDER — LORAZEPAM 2 MG/ML
0.5 INJECTION INTRAMUSCULAR EVERY 4 HOURS PRN
Status: DISCONTINUED | OUTPATIENT
Start: 2018-11-26 | End: 2018-11-29 | Stop reason: HOSPADM

## 2018-11-26 RX ADMIN — BUSPIRONE HYDROCHLORIDE 10 MG: 10 TABLET ORAL at 07:51

## 2018-11-26 RX ADMIN — BUSPIRONE HYDROCHLORIDE 10 MG: 10 TABLET ORAL at 21:05

## 2018-11-26 RX ADMIN — FUROSEMIDE 40 MG: 10 INJECTION, SOLUTION INTRAMUSCULAR; INTRAVENOUS at 03:51

## 2018-11-26 RX ADMIN — METFORMIN HYDROCHLORIDE 500 MG: 500 TABLET ORAL at 09:37

## 2018-11-26 RX ADMIN — ZOLEDRONIC ACID 4 MG: 0.04 INJECTION, SOLUTION INTRAVENOUS at 09:37

## 2018-11-26 RX ADMIN — INSULIN ASPART 7 UNITS: 100 INJECTION, SOLUTION INTRAVENOUS; SUBCUTANEOUS at 12:34

## 2018-11-26 RX ADMIN — DEXAMETHASONE 8 MG: 4 TABLET ORAL at 07:51

## 2018-11-26 RX ADMIN — ATORVASTATIN CALCIUM 10 MG: 10 TABLET, FILM COATED ORAL at 21:06

## 2018-11-26 RX ADMIN — LORAZEPAM 0.5 MG: 2 INJECTION INTRAMUSCULAR; INTRAVENOUS at 03:51

## 2018-11-26 RX ADMIN — LEVETIRACETAM 500 MG: 500 TABLET, FILM COATED ORAL at 21:06

## 2018-11-26 RX ADMIN — SENNOSIDES AND DOCUSATE SODIUM 1 TABLET: 8.6; 5 TABLET ORAL at 21:06

## 2018-11-26 RX ADMIN — SODIUM CHLORIDE, PRESERVATIVE FREE 5 ML: 5 INJECTION INTRAVENOUS at 03:06

## 2018-11-26 RX ADMIN — ONDANSETRON 4 MG: 2 INJECTION INTRAMUSCULAR; INTRAVENOUS at 21:00

## 2018-11-26 RX ADMIN — LEVETIRACETAM 500 MG: 500 TABLET, FILM COATED ORAL at 07:50

## 2018-11-26 RX ADMIN — IOPAMIDOL 80 ML: 755 INJECTION, SOLUTION INTRAVENOUS at 02:26

## 2018-11-26 RX ADMIN — DRONABINOL 2.5 MG: 2.5 CAPSULE ORAL at 21:05

## 2018-11-26 RX ADMIN — FUROSEMIDE 40 MG: 10 INJECTION, SOLUTION INTRAMUSCULAR; INTRAVENOUS at 12:44

## 2018-11-26 RX ADMIN — PANTOPRAZOLE SODIUM 40 MG: 40 TABLET, DELAYED RELEASE ORAL at 06:06

## 2018-11-26 RX ADMIN — ONDANSETRON 4 MG: 2 INJECTION INTRAMUSCULAR; INTRAVENOUS at 06:09

## 2018-11-26 RX ADMIN — DRONABINOL 2.5 MG: 2.5 CAPSULE ORAL at 07:49

## 2018-11-26 RX ADMIN — METFORMIN HYDROCHLORIDE 500 MG: 500 TABLET ORAL at 18:13

## 2018-11-26 RX ADMIN — INSULIN ASPART 5 UNITS: 100 INJECTION, SOLUTION INTRAVENOUS; SUBCUTANEOUS at 18:13

## 2018-11-26 RX ADMIN — FUROSEMIDE 40 MG: 10 INJECTION, SOLUTION INTRAMUSCULAR; INTRAVENOUS at 07:51

## 2018-11-26 RX ADMIN — SENNOSIDES AND DOCUSATE SODIUM 2 TABLET: 8.6; 5 TABLET ORAL at 07:50

## 2018-11-26 RX ADMIN — ALBUTEROL SULFATE 2.5 MG: 2.5 SOLUTION RESPIRATORY (INHALATION) at 00:25

## 2018-11-26 RX ADMIN — DEXAMETHASONE 8 MG: 4 TABLET ORAL at 21:05

## 2018-11-26 RX ADMIN — HYDROMORPHONE HYDROCHLORIDE 0.5 MG: 1 INJECTION, SOLUTION INTRAMUSCULAR; INTRAVENOUS; SUBCUTANEOUS at 09:37

## 2018-11-26 RX ADMIN — LORAZEPAM 0.5 MG: 2 INJECTION INTRAMUSCULAR; INTRAVENOUS at 07:50

## 2018-11-26 RX ADMIN — ENOXAPARIN SODIUM 40 MG: 40 INJECTION SUBCUTANEOUS at 03:51

## 2018-11-26 RX ADMIN — SODIUM CHLORIDE 90 ML: 9 INJECTION, SOLUTION INTRAVENOUS at 02:26

## 2018-11-26 RX ADMIN — INSULIN ASPART 2 UNITS: 100 INJECTION, SOLUTION INTRAVENOUS; SUBCUTANEOUS at 07:53

## 2018-11-26 ASSESSMENT — ACTIVITIES OF DAILY LIVING (ADL)
ADLS_ACUITY_SCORE: 13
ADLS_ACUITY_SCORE: 14
ADLS_ACUITY_SCORE: 13

## 2018-11-26 NOTE — PLAN OF CARE
Problem: Patient Care Overview  Goal: Plan of Care/Patient Progress Review  Patient up to chair for meals. Ate breakfast well, but not much at 2 other meals. Denies pain, sleeping on/off throughout shift. IV fluids infusing. Bed alarm on for safety.

## 2018-11-26 NOTE — PROGRESS NOTES
Patient is now on BIPAP and seems to be tolerating it better.  Does complain of having a dry mouth.  Patient given water, and lip moisture applied.   Also  given ativan for anxiety.

## 2018-11-26 NOTE — PROGRESS NOTES
"SPIRITUAL HEALTH SERVICES  SPIRITUAL ASSESSMENT Progress Note  [facility] [place] - ICU    I met briefly with Raphael's family, Mom and a friend, in the ICU waiting room.  They stated that he \"was kind of out of it today\" so they were waiting to find out what the plan was for today, discharge or not, etc.  I affirmed SH availability to follow with pt and with them and pt's Mom stated she would reach out if they or Raphael were interested in a follow up visit.    Tony Burgess M.A., [facility]  Staff   [facility]  Office: [phone]  Cell: [phone]  Pager [phone]    "

## 2018-11-26 NOTE — PROGRESS NOTES
At 0930, pt's heart rate was 130 despite having ativan at 0750 for anxiety, denied pain at the time but had intermittent stomach pain around 0730 which he reported resolved.  Dilaudid given, HR at 115.

## 2018-11-26 NOTE — PROGRESS NOTES
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          Failure to thrive in adult  Overall not doing well since starting whole brain radiation. Poor oral intake, dehydrated, generally weak. Family is concerned about his safety at home while they are gone at work. Patient was a direct admit request from Dr. Quijano (radiation oncologist) for further evaluation.  Remains deconditioned and weak, now in ICU due to hypoxemia.  - No longer on IVF; monitor oral intake.  - Social work consult to help obtain resources such as home health care.  - PT/OT evaluations ongoing.  - Nutrition consult requested to help address poor appetite; high calorie/high protein supplements recommended in addition to general diet.          Abdominal pain, generalized  Poor oral intake  Postprandial fullness  On admission reported inconsistent bowel movements, nausea, and vomiting. Develops postprandial fullness after just a few bites of food. Afebrile, minimal leukocytosis (WBC 11.1) upon admission.  CT abdomen 11/23/18 showed significant increased in tumor burden in the lungs, abd lymph nodes, left kidney bed and left adrenal gland, but no evidence of bowel obstruction.  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  Currently reports no abdominal pain, no nausea or recent emesis, abdominal exam unremarkable.  - Some improvement in abdominal symptoms with dronabinol.   - Palliative Care has qualified him for medical marijuana upon discharge.         Hypercalcemia   Etiology not  entirely clear, but has lucencies in the bilateral humeral heads on chest CT suggestive of bony mets.  May also be due to a PTHrH from neoplasm; labs pending.  Ca 12.5 on admission, treated with IVF, improved to 10.9 on 11/25/18.  So IVF stopped.  Ca then 11.1-->11.7 this morning after furosemide 40 mg x3 doses yesterday.  Ca ionized 6.6 this morning.  - Given rise in Ca despite recent IVF and loop diuretics, will give zoledronic acid 4 mg x1 today.  - PTH and PTHrH pending.     Brain mets with generalized edema  -CT head done 11/25/18:  1. Interval right frontal craniotomy for tumor resection.  2. Increasing areas of vasogenic edema in both hemispheres with  associated underlying lesions. The pattern suggests progressive  metastatic disease. Clinical correlation suggested.  3. No evidence for intracranial hemorrhage or midline shift.   - Suspect edema described above is due to XRT, but will discuss with Dr Quijano, Radiation Oncology, to get his opinion.  - Remains on dexamethasone 8 mg BID.      Generalized weakness, risk for falls  Will be alone all day starting this week while family is at work. Family concerned about patient's safety with his weakness.  Brain mets likely affecting patient's balance and overall function.   - PT/OT evaluations underway.  - SW consult, will likely benefit from home health care.            Cough   Chronic cough. Known lung mets. Chest CT on 11/26/18 confirms the present of extensive metastatic disease to both lungs in the form of large bilateral masses.  Cough reported to be mild and non-productive this morning. - low suspicion for pneumonia. Cough managed prior to admission with Tessalon and albuterol without much relief.  - Continue benzonatate and albuterol PRN.          Tachycardia   HR 90 - 118 last 24 hours.  Tachycardia has been mild and persistent since admission.  Cause unclear.  NO evidence of PE on chest CT 11/26/18.   - Observe HR.          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 S/P left nephrectomy in June 2018, S/P brain tumor resection on 11/1/18, all through [facility]. Follows with oncology Dr. Rodriguez through [facility], but is doing radiation at [facility], which is closer to home. On day of admission (11/23/2018) he received round 6 of 10 of whole brain radiation. Has been prescribed carozantinib for RCC but has not yet started taking it. Receives decadron 4 mg q 6 hours prior to admission for radiation induced inflammation. On Keppra prior to admission.  - Hold prior to admission carozantinib  - Continue prior to admission dexamethasone, though dose changed from 4 mg Q6H to 8 mg Q12H.  - Continue prior to admission Keppra.          Type 2 diabetes mellitus treated without insulin  Hgb A1c 6.7 on admission.  Managed prior to admission with metformin 1000 mg BID, though is not receiving metformin here in-house; can't find documentation as to why as GFR has been OK.  Glucose consistently high, 178 - 281 last 5 checks.  - Resume metformin, initially at 500 mg BID, increase dose if needed.  - Continue high dose sliding scale insulin  - Hyper/hypoglycemia protocol.          Essential hypertension with goal blood pressure less than 130/80  Not on antihypertensive medications prior to admission.  BP OK here on no Rx.  - Continue to observe off of medications.          Anxiety disorder due to general medical condition  Stable mood upon admission. Managed prior to admission with Buspar, continue.    Hypoxemic respiratory failure, acute  O2 needs increased over night 11/25-26, transferred to ICU.  BiPAP used for about an hour, patient had difficulties tolerating it so it has been removed.  Currently on O2 per 10 L mask.  Denies dyspnea or respiratory chest pain, finds the mask to be uncomfortable.  CT chest 11/26/18 showed no PE, extensive bilateral masses unchanged, mild perihilar airspace opacities new from prior study possibly  representing pulmonary edema.  I/O's reviewed; had net output 11/25-26 of 3.2 L.  - Given that his respiratory status and O2 needs are stable, will observe without Rx for now.  If hypoxemia worsens, will give another dose furosemide.          Fluids: Saline lock, oral only.  Electrolytes: Monitor  Nutrition: Regular diet with high calorie/high protein supplements, but not eating much.      DVT Prophylaxis: Enoxaparin.  Code Status: Full Code - discussed directly with patient and his family     Dispo:   Will home care after discharge; options being explored.   Wants to change Oncology to down here from [facility] since he lives here now.  Will help arrange prior to discharge.   Anticipate discharge in 3 to 5 additional days.     Attestation:  I have reviewed today's vital signs, notes, medications, labs and imaging.  Amount of time performed on this critical care visit: 50 minutes.    Surinder Rivera MD      Interval History   Denies dyspnea at rest.  Cough is less severe, is non-productive.  No respiratory chest pain.  No current nausea, no recent emesis.  Not eating much, reports early satiety.  No chest pain or orthopnea.    Physical Exam   Temp:  [97.3  F (36.3  C)-98.7  F (37.1  C)] 97.3  F (36.3  C)  Pulse:  [] 112  Heart Rate:  [104-118] 113  Resp:  [7-25] 16  BP: (115-165)/(70-87) 125/87  FiO2 (%):  [50 %] 50 %  SpO2:  [86 %-97 %] 92 %    Weights:   Vitals:    11/24/18 0623 11/25/18 0607 11/26/18 0300   Weight: 79.4 kg (175 lb 0.7 oz) 75.5 kg (166 lb 7.2 oz) 98 kg (216 lb 0.8 oz)    Body mass index is 31.91 kg/(m^2).    GENERAL: Pleasant man, looks fatigued, but in no distress.  EYES: Eyes grossly normal to inspection, extraocular movements intact  HENT: Deferred due to O2 mask.  NECK: Trachea midline, no stridor.    RESP: No accessory muscle use.  Symmetrical breath sounds.  Scattered areas of coarse crackles on inspiration over both sides, loudest left anterior.  Expiration not prolonged, no  wheeze.  CV: Regular rate and rhythm, mildly tachycardic.  Normal S1 S2, no murmur or extra sound.  No lower extremity edema.  ABDOMEN: Soft, non-tender, no guarding.  Liver and spleen not palpable, no masses palpable.  Bowel sounds positive.  MS: No bony deformities noted.  No red or inflamed joints.  SKIN: Warm and dry, no rashes.  NEURO: Alert, oriented, conversant.  Cranial nerves II - XII grossly intact.  No gross motor or sensory deficits.    PSYCH: Calm, alert, conversant.  Able to articulate logical thoughts, no tangential thoughts, no hallucinations or delusions.  Affect normal.        Data     Recent Labs  Lab 11/26/18 0455 11/26/18 0045 11/25/18 1659 11/23/18  1801   WBC 10.1 9.5  --   --  11.1*   HGB 14.2 13.9  --   --  15.2   MCV 86 86  --   --  86    182  --   --  192    137 136  --  136   POTASSIUM 3.5 4.0 4.0  --  4.5   CHLORIDE 93* 97 98  --  101   CO2 34* 33* 30  --  24   BUN 25 24 28  --  33*   CR 1.19 1.15 1.08  --  1.10   ANIONGAP 9 7 8  --  11   MED 11.7* 11.1* 10.9*  --  12.5*   * 256* 281*  --  239*   ALBUMIN 2.6* 2.5* 2.3*  < >  --    PROTTOTAL 6.6* 6.2* 5.8*  < >  --    BILITOTAL 0.4 0.4 0.2  < >  --    ALKPHOS 49 47 45  < >  --    ALT 18 18 16  < >  --    AST 16 17 14  < >  --    < > = values in this interval not displayed.      Recent Labs  Lab 11/26/18 0455 11/26/18 0045 11/25/18  2134 11/25/18  1659 11/25/18  1620 11/25/18  1144 11/25/18  0626 11/25/18  0206 11/24/18  2124  11/23/18  1801   * 256*  --  281*  --   --   --   --   --   --  239*   BGM  --   --  191*  --  319* 314* 194* 233* 213*  < >  --    < > = values in this interval not displayed.     Unresulted Labs Ordered in the Past 30 Days of this Admission     Date and Time Order Name Status Description    11/26/2018 0243 Methicillin Resist/Sens S. aureus PCR In process     11/26/2018 0000 Parathyroid Hormone Intact In process     11/26/2018 0000 PTH Related Peptide Test In process             Imaging  Recent Results (from the past 24 hour(s))   CT Head w/o Contrast    Narrative    CT SCAN OF THE HEAD WITHOUT CONTRAST   11/25/2018 5:20 PM     HISTORY: Rule out ICH.    TECHNIQUE:  Axial images of the head and coronal reformations without  IV contrast material.  Radiation dose for this scan was reduced using  automated exposure control, adjustment of the mA and/or kV according  to patient size, or iterative reconstruction technique.    COMPARISON: 10/17/2018    FINDINGS: During the interval, there has been a right frontal  craniotomy for tumor resection. There is some volume loss at the site  of tumor resection. There are also bilateral areas of low density  consistent with vasogenic edema in both hemispheres with other nodular  lesion seen in the left hemisphere measuring approximately 1.2 and 0.9  cm in size. The amounts of vasogenic edema have significantly  progressed since the prior study. Ventricles are not enlarged. There  is no evidence for intracranial hemorrhage or acute infarct.      Impression    IMPRESSION:  1. Interval right frontal craniotomy for tumor resection.  2. Increasing areas of vasogenic edema in both hemispheres with  associated underlying lesions. The pattern suggests progressive  metastatic disease. Clinical correlation suggested.  3. No evidence for intracranial hemorrhage or midline shift.     JOSIE SKELTON MD   Chest 2 Views*    Narrative    CHEST 2 VIEWS   11/26/2018 1:13 AM     HISTORY: New hypoxia.    COMPARISON: 11/23/2018 - CT chest, abdomen and pelvis.    FINDINGS: Moderate-sized mass-like opacities in bilateral lung bases  in addition to a few smaller patchy nodular opacities scattered within  the upper and mid lungs bilaterally, not convincingly changed since  the comparison CT scan dated 11/23/2018, allowing for differences in  technique. Normal-sized cardiac silhouette. Right anterior chest wall  port with catheter entering the right internal jugular vein and  distal  tip in the right atrium.      Impression    IMPRESSION:   1. No convincing interval change since 10/23/2018.  2. A few opacities scattered within both lungs, including dominant  moderate-sized mass-like opacities in bilateral lung bases.    JARAD NICHOLE MD   CT Chest pulmonary embolism w contrast    Narrative    CT CHEST WITH CONTRAST   11/26/2018 2:41 AM     HISTORY: New hypoxia.    COMPARISON: 11/23/2018 - CT chest, abdomen and pelvis.    TECHNIQUE: Following the uneventful administration of 80 mL Isovue-370  intravenous contrast, helical sections were acquired through the lungs  according to the pulmonary embolism protocol. Coronal reconstructions  were generated. Radiation dose for this scan was reduced using  automated exposure control, adjustment of the mA and/or kV according  to the patient's size, or iterative reconstruction technique.    FINDINGS: No visualized pulmonary embolus. The thoracic aorta is  normal in caliber without dissection. Right anterior chest wall port  with catheter entering the right internal jugular vein and distal tip  in the right atrium.    Several mass-like opacities scattered within both lungs, including a  dominant 11 cm opacity in the right lung base, not convincingly  changed since 11/23/2018. Mild groundglass opacities scattered with in  the central aspects of both lungs, new. Tiny left pleural effusion  again noted. No right pleural or pericardial effusion. Several mildly  and moderately enlarged mediastinal and right hilar lymph nodes,  unchanged. For example, there is a 2.9 x 2.8 cm right hilar lymph node  (series 4,  image 69). Patchy lucencies within bilateral humeral heads  again noted.    Scan through the upper abdomen is significant for a complex 11 x 8 cm  fluid collection abutting the posterior wall of the stomach (series 4,  image 134) that has moderately increased in size. Again noted are  moderate diffuse fatty infiltration of the liver, moderate  nodularity  of the right adrenal gland, prior left nephrectomy, a 9 cm  heterogeneous mass centered in the left renal fossa and mild  splenomegaly.      Impression    IMPRESSION:   1. Mild groundglass opacities in the central aspects of both lungs  that could represent atelectasis or pulmonary edema.  2. No visualized pulmonary embolus.  3. Extensive metastatic neoplasm in the chest including several  bilateral pulmonary masses, several enlarged thoracic lymph nodes, a  probable right adrenal metastasis, probable bilateral humeral  metastasis, and a large metastasis in the left renal fossa, not  convincingly changed since the recent study dated 11/23/2018.  4. Nonspecific moderate-sized irregular-shaped 11 x 8 cm fluid  collection in the upper left hemiabdomen and abutting the posterior  wall of the stomach, moderately increased in size.    JARAD NICHOLE MD        I reviewed all new labs and imaging results over the last 24 hours. I personally reviewed the chest CT image(s) showing large bilateral masses, no evidence of PE..    Medications       atorvastatin  10 mg Oral At Bedtime     busPIRone  10 mg Oral BID     dexamethasone  8 mg Oral Q12H MONICA     dronabinol  2.5 mg Oral BID     enoxaparin  40 mg Subcutaneous Q24H     furosemide  40 mg Intravenous Q4H     heparin  5 mL Intracatheter Q28 Days     heparin lock flush  5-10 mL Intracatheter Q24H     insulin aspart  1-10 Units Subcutaneous TID AC     insulin aspart  1-7 Units Subcutaneous At Bedtime     levETIRAcetam  500 mg Oral BID     pantoprazole  40 mg Oral QAM AC     senna-docusate  1 tablet Oral BID    Or     senna-docusate  2 tablet Oral BID       Surinder Rivera MD

## 2018-11-26 NOTE — PROGRESS NOTES
[facility]    Hospital Medicine   Cross Cover Note  Date of Service: 2018     I was called due to hypoxia. He had been maintaining 92% on room air. During a routine vitals check at 11:42pm he would found to be in the 80s on room air, 86% on 2LPM, and required 6 LPM to get above 90%. Now maintaining 94% with 8LPM via oxymask.    Has known lung mets from renal cell carcinoma. CT chest on admission shows significantly increased tumor burden.    He denies chest pain or dyspnea, but acknowledges feeling weak and tired.     Tachycardic at 112, baseline has been ~90. Crackles in right base. Not febrile. Not exhibiting increased work of breathing. No calf pain or swelling. No lower extremity edema.     Stat labs/imaging:  VB.48/49/34/36  BMP: unchanged  BNP: 83  CBC: normal    CXR:   IMPRESSION:   1. No convincing interval change since 10/23/2018.  2. A few opacities scattered within both lungs, including dominant  moderate-sized mass-like opacities in bilateral lung bases.    Already has orders for PRN albuterol.    After discussing the case with Dr. Canales, ordered CTA PE study and transfer to ICU for bipap.    Additionally, RN requesting Puentes catheter as he is getting Lasix q6h but is weak and tired making it difficult for him to make it to the bathroom frequently. Ordered Puentes.    Archie Winters PA-C on 2018 at 12:33 AM

## 2018-11-26 NOTE — PROGRESS NOTES
Patient asking to have oxymask off, which he is on 10L with sats 90-92%.  States he hasn't slept,all though he has appeared to be dozing, and that he can't sleep with oxygen on. Explained to him he will need it on today, which clearly is frustrating to him.  Also request frequent drinks of water.  Unable to coordinate the straw through to his mouth through the large open areas in the oxymask even with guidance.

## 2018-11-26 NOTE — PROGRESS NOTES
Patient did not tolerate High flow oxygen very well.  He was only on it for less than 30 minutes.  It made him feel more short of air and like he couldn't breath out.  Did improve his oxygen saturations, but unable to tolerate.  RT to attempt BIPAP with patient.  Spoke with MD.  Will also give lasix and ativan.

## 2018-11-26 NOTE — PROGRESS NOTES
At start of shift found pt sitting bedside with urinal, pt stated feeling very frustrated w/frequent urination because he was tired and wanting to sleep. Running  ml/hr. Pt wanting care clustered. Other staff aware of being available to help pt w/sitting bedside to use urinal as pt is fatigued and unsteady in sitting position. At 2340  pt bedside o2 85%, continued check laying back in bed on back 86%, applied NC at 2 lpm and titrated up, put on oxymask 8 lpm, RT called, neb given, lasix given, hardin inserted. Pt continued to drop sats and decision was made to transfer to ICU.

## 2018-11-26 NOTE — PROGRESS NOTES
[facility] TRANSPORT NOTE  Data:   Reason for Transport:  Hypoxia     Raphael Marin was transported to CT then ICU room 3 via wheel chair at 0222.  Patient was accompanied by Nursing Assistant. Equipment used for transport: Oxygen  High Flow Mask. Family was aware of reason for transport: yes spoke w/mother [place]    Action:  Report: given to Clair POLANCO RN    Response:  Patient's condition when transferred off unit was stable.    Erin Larson

## 2018-11-26 NOTE — PROGRESS NOTES
Patient wore BIPAP for about 2 hours.  Did remove it about every 30 minutes to drink water.  Patient then requested it be taken off at 0600 as he couldn't tolerate it and wanted a break. Remains tach 110-120's.  Reported abdominal fullness.  No other complaints besides poor sleep x2 days.

## 2018-11-27 PROBLEM — R62.7 FAILURE TO THRIVE IN ADULT: Status: RESOLVED | Noted: 2018-11-23 | Resolved: 2018-11-27

## 2018-11-27 PROBLEM — R53.81 DECLINING FUNCTIONAL STATUS: Status: ACTIVE | Noted: 2018-11-27

## 2018-11-27 LAB
ANION GAP SERPL CALCULATED.3IONS-SCNC: 7 MMOL/L (ref 3–14)
BUN SERPL-MCNC: 27 MG/DL (ref 7–30)
CA-I BLD-MCNC: 6.2 MG/DL (ref 4.4–5.2)
CALCIUM SERPL-MCNC: 11.6 MG/DL (ref 8.5–10.1)
CHLORIDE SERPL-SCNC: 92 MMOL/L (ref 94–109)
CO2 SERPL-SCNC: 36 MMOL/L (ref 20–32)
CREAT SERPL-MCNC: 1.32 MG/DL (ref 0.66–1.25)
GFR SERPL CREATININE-BSD FRML MDRD: 60 ML/MIN/1.7M2
GLUCOSE BLDC GLUCOMTR-MCNC: 214 MG/DL (ref 70–99)
GLUCOSE BLDC GLUCOMTR-MCNC: 252 MG/DL (ref 70–99)
GLUCOSE BLDC GLUCOMTR-MCNC: 281 MG/DL (ref 70–99)
GLUCOSE BLDC GLUCOMTR-MCNC: 285 MG/DL (ref 70–99)
GLUCOSE BLDC GLUCOMTR-MCNC: 291 MG/DL (ref 70–99)
GLUCOSE SERPL-MCNC: 234 MG/DL (ref 70–99)
LACTATE BLD-SCNC: 2.6 MMOL/L (ref 0.7–2)
POTASSIUM SERPL-SCNC: 3.6 MMOL/L (ref 3.4–5.3)
SODIUM SERPL-SCNC: 135 MMOL/L (ref 133–144)

## 2018-11-27 PROCEDURE — 25000132 ZZH RX MED GY IP 250 OP 250 PS 637: Performed by: PHYSICIAN ASSISTANT

## 2018-11-27 PROCEDURE — 20000003 ZZH R&B ICU

## 2018-11-27 PROCEDURE — 25000132 ZZH RX MED GY IP 250 OP 250 PS 637: Performed by: NURSE PRACTITIONER

## 2018-11-27 PROCEDURE — 25000128 H RX IP 250 OP 636: Performed by: FAMILY MEDICINE

## 2018-11-27 PROCEDURE — 99233 SBSQ HOSP IP/OBS HIGH 50: CPT

## 2018-11-27 PROCEDURE — 25000128 H RX IP 250 OP 636: Performed by: PHYSICIAN ASSISTANT

## 2018-11-27 PROCEDURE — 25000131 ZZH RX MED GY IP 250 OP 636 PS 637: Performed by: PHYSICIAN ASSISTANT

## 2018-11-27 PROCEDURE — 99358 PROLONG SERVICE W/O CONTACT: CPT | Performed by: NURSE PRACTITIONER

## 2018-11-27 PROCEDURE — 94660 CPAP INITIATION&MGMT: CPT

## 2018-11-27 PROCEDURE — 25000132 ZZH RX MED GY IP 250 OP 250 PS 637: Performed by: FAMILY MEDICINE

## 2018-11-27 PROCEDURE — 82330 ASSAY OF CALCIUM: CPT

## 2018-11-27 PROCEDURE — 00000146 ZZHCL STATISTIC GLUCOSE BY METER IP

## 2018-11-27 PROCEDURE — 40000809 ZZH STATISTIC NO DOCUMENTATION TO SUPPORT CHARGE

## 2018-11-27 PROCEDURE — 25000132 ZZH RX MED GY IP 250 OP 250 PS 637

## 2018-11-27 PROCEDURE — 83605 ASSAY OF LACTIC ACID: CPT

## 2018-11-27 PROCEDURE — 99233 SBSQ HOSP IP/OBS HIGH 50: CPT | Performed by: NURSE PRACTITIONER

## 2018-11-27 PROCEDURE — 80048 BASIC METABOLIC PNL TOTAL CA: CPT

## 2018-11-27 PROCEDURE — 40000275 ZZH STATISTIC RCP TIME EA 10 MIN

## 2018-11-27 RX ORDER — DRONABINOL 2.5 MG/1
5 CAPSULE ORAL 2 TIMES DAILY
Status: DISCONTINUED | OUTPATIENT
Start: 2018-11-27 | End: 2018-11-29 | Stop reason: HOSPADM

## 2018-11-27 RX ORDER — METHYLPHENIDATE HYDROCHLORIDE 5 MG/1
5 TABLET ORAL 2 TIMES DAILY
Status: DISCONTINUED | OUTPATIENT
Start: 2018-11-27 | End: 2018-11-29 | Stop reason: HOSPADM

## 2018-11-27 RX ORDER — BISACODYL 10 MG
10 SUPPOSITORY, RECTAL RECTAL ONCE
Status: COMPLETED | OUTPATIENT
Start: 2018-11-27 | End: 2018-11-27

## 2018-11-27 RX ADMIN — BENZONATATE 200 MG: 100 CAPSULE ORAL at 04:14

## 2018-11-27 RX ADMIN — INSULIN ASPART 7 UNITS: 100 INJECTION, SOLUTION INTRAVENOUS; SUBCUTANEOUS at 12:13

## 2018-11-27 RX ADMIN — ATORVASTATIN CALCIUM 10 MG: 10 TABLET, FILM COATED ORAL at 22:20

## 2018-11-27 RX ADMIN — ONDANSETRON 4 MG: 2 INJECTION INTRAMUSCULAR; INTRAVENOUS at 22:25

## 2018-11-27 RX ADMIN — SENNOSIDES AND DOCUSATE SODIUM 1 TABLET: 8.6; 5 TABLET ORAL at 20:16

## 2018-11-27 RX ADMIN — DRONABINOL 5 MG: 2.5 CAPSULE ORAL at 17:03

## 2018-11-27 RX ADMIN — METFORMIN HYDROCHLORIDE 500 MG: 500 TABLET ORAL at 07:47

## 2018-11-27 RX ADMIN — LEVETIRACETAM 500 MG: 500 TABLET, FILM COATED ORAL at 20:16

## 2018-11-27 RX ADMIN — METFORMIN HYDROCHLORIDE 500 MG: 500 TABLET ORAL at 17:03

## 2018-11-27 RX ADMIN — ENOXAPARIN SODIUM 40 MG: 40 INJECTION SUBCUTANEOUS at 04:04

## 2018-11-27 RX ADMIN — BISACODYL 10 MG: 10 SUPPOSITORY RECTAL at 16:00

## 2018-11-27 RX ADMIN — INSULIN ASPART 3 UNITS: 100 INJECTION, SOLUTION INTRAVENOUS; SUBCUTANEOUS at 07:50

## 2018-11-27 RX ADMIN — HYDROCODONE BITARTRATE AND ACETAMINOPHEN 1 TABLET: 5; 325 TABLET ORAL at 12:13

## 2018-11-27 RX ADMIN — DEXAMETHASONE 8 MG: 4 TABLET ORAL at 20:16

## 2018-11-27 RX ADMIN — Medication 1 MG: at 22:20

## 2018-11-27 RX ADMIN — DEXAMETHASONE 8 MG: 4 TABLET ORAL at 07:46

## 2018-11-27 RX ADMIN — DRONABINOL 2.5 MG: 2.5 CAPSULE ORAL at 07:46

## 2018-11-27 RX ADMIN — INSULIN ASPART 6 UNITS: 100 INJECTION, SOLUTION INTRAVENOUS; SUBCUTANEOUS at 17:04

## 2018-11-27 RX ADMIN — HYDROCODONE BITARTRATE AND ACETAMINOPHEN 1 TABLET: 5; 325 TABLET ORAL at 06:45

## 2018-11-27 RX ADMIN — BUSPIRONE HYDROCHLORIDE 10 MG: 10 TABLET ORAL at 20:15

## 2018-11-27 RX ADMIN — BUSPIRONE HYDROCHLORIDE 10 MG: 10 TABLET ORAL at 07:46

## 2018-11-27 RX ADMIN — BENZONATATE 200 MG: 100 CAPSULE ORAL at 22:20

## 2018-11-27 RX ADMIN — PANTOPRAZOLE SODIUM 40 MG: 40 TABLET, DELAYED RELEASE ORAL at 06:25

## 2018-11-27 RX ADMIN — METHYLPHENIDATE HYDROCHLORIDE 5 MG: 5 TABLET ORAL at 12:13

## 2018-11-27 RX ADMIN — LEVETIRACETAM 500 MG: 500 TABLET, FILM COATED ORAL at 07:47

## 2018-11-27 RX ADMIN — SENNOSIDES AND DOCUSATE SODIUM 2 TABLET: 8.6; 5 TABLET ORAL at 07:47

## 2018-11-27 ASSESSMENT — PAIN DESCRIPTION - DESCRIPTORS
DESCRIPTORS: ACHING
DESCRIPTORS: ACHING;DISCOMFORT

## 2018-11-27 ASSESSMENT — ACTIVITIES OF DAILY LIVING (ADL)
ADLS_ACUITY_SCORE: 13
ADLS_ACUITY_SCORE: 13
ADLS_ACUITY_SCORE: 14
ADLS_ACUITY_SCORE: 13
ADLS_ACUITY_SCORE: 13
ADLS_ACUITY_SCORE: 14

## 2018-11-27 NOTE — PROGRESS NOTES
Palliative Care Follow-up Clinic Note  Date of Admission:  11/23/2018   Visit Date:  November 27, 2018        HISTORY of PRESENT ILLNESS:  Raphael Marin is a 38 year old year old male admitted with renal cell carcinoma discovered in May of this year; it was metastatic to lymph and lung at the time of diagnosis, and later metastasized to the brain.  He presented here on 11/23 with weakness, falls, anorexia, failure to thrive.  He is currently undergoing brain radiation with 4 additional treatments pending, and with plans to initiate Cabozantinib, a TKI, upon completion of radiation.  He was referred to Palliative Care for certification for medical cannabis .        ASSESSMENT & PLAN:     # Metastatic renal cell carcinoma (lung, brain, lymph)  # Anorexia, weakness, weight loss; mother reports appetite yesterday improved after start of Marinol 2.5 mg and Remeron at HS  > certified Raphael as eligible for medical cannabis  > 11/27: appetite poor, possibly r/t somnolence.  Increased Dronabinol from 2.5 to 5 mg     # Constipation  > upon discharge, replace colace with Senna, and continue daily Miralax.  PRN Bisacodyl.   > 11/27: feels constipated.  Bisacodyl r/s today     # Recurrent Nausea; has no antiemetic Rx's at home.  Was given 2 doses Zofran yesterday, and one today thus far.   > see Discharge instructions     # Somnolence possibly 2o hypercalcemia of malignancy.  Zometa given yesterday.  Doesn't meet criteria for subcutaneous miacalcin.  No IV fluids running.  > Ritalin 5 mg bid with 2nd dose of the day no later than 2pm    # Advance Care Planning:  > Code status:  Full Code     11/27: discussed with patient and mother again.  He is undecided.  I told him he could think about it, but I also advised his mother to ask him if he would like her to make the decision for him, and she supports DNR/DNI.  I'll revisit this again tomorrow.  > Health Care Directive: NO  > POLST:  No; mother is the default decision make should he  lose capacity as his father is dead.     # Goals/Dispo:  Wants to go home so he can resume radiation and start oral chemo upon completion of radiation, as previously planned  > 11/27: mother tells me she wants to take him home with hospice care; we can't act on this until he expresses his agreement so will review this with him tomorrow.    Thank you for the opportunity to be of service to this patient and family.      Shane Guerrero CNP ([facility])  Palliative Care  Private cell:  [phone]       Face to face:   1035 - 1110, 35 min, > 50% spent discussing code status with patient and mother.   Non face to face:  1110 - 1210, 60 min, > 50% spent having repeated discussions with patient's mother about hospice and management of symptoms at home, and sources of support/assistance if she is home alone as his primary caregiver.     ========================    SUBJECTIVE:  Feels constipated.  Can't decide whether to change code status to DNR/DNI.  Remains very somnolent--ceiling lift used yesterday to get patient back into bed from chair, and no has a hardin in place.  Eating little per Mom--a full bottle of Boost instead of a meal is the best that she has witnessed.  Personal and family friends visiting though he isn't conversant with them at all.      ROS:  As described in HPI and Subjective.  Otherwise, ALL are negative.    MEDICATIONS:  No Known Allergies  Scheduled meds:    atorvastatin  10 mg Oral At Bedtime     bisacodyl  10 mg Rectal Once     busPIRone  10 mg Oral BID     dexamethasone  8 mg Oral Q12H MONICA     dronabinol  2.5 mg Oral BID     enoxaparin  40 mg Subcutaneous Q24H     heparin  5 mL Intracatheter Q28 Days     heparin lock flush  5-10 mL Intracatheter Q24H     insulin aspart  1-10 Units Subcutaneous TID AC     insulin aspart  1-7 Units Subcutaneous At Bedtime     levETIRAcetam  500 mg Oral BID     metFORMIN  500 mg Oral BID w/meals     methylphenidate  5 mg Oral BID     pantoprazole  40 mg Oral QAM AC      senna-docusate  1 tablet Oral BID    Or     senna-docusate  2 tablet Oral BID     PRN meds:  acetaminophen, acetaminophen, albuterol, benzonatate, bisacodyl **OR** bisacodyl **OR** bisacodyl, glucose **OR** dextrose **OR** glucagon, heparin lock flush, HYDROcodone-acetaminophen, HYDROmorphone, lidocaine 4%, lidocaine 4%, lidocaine (buffered or not buffered), LORazepam, magnesium citrate, melatonin, naloxone, ondansetron **OR** ondansetron, polyethylene glycol, prochlorperazine **OR** prochlorperazine **OR** prochlorperazine, sodium chloride (PF), sodium chloride (PF)      OBJECTIVE:  Patient Vitals for the past 24 hrs:   BP Temp Temp src Heart Rate Resp SpO2   11/27/18 1100 127/77 98.5  F (36.9  C) Oral 105 15 93 %   11/27/18 0900 126/71 - - 112 20 92 %   11/27/18 0800 114/74 - - 110 25 90 %   11/27/18 0700 117/75 - - 104 (!) 0 94 %   11/27/18 0600 116/82 - - 107 (!) 32 90 %   11/27/18 0500 121/77 - - 110 13 92 %   11/27/18 0405 - - - 113 - -   11/27/18 0400 125/76 98.2  F (36.8  C) Oral 118 15 90 %   11/27/18 0300 123/72 - - 117 (!) 0 90 %   11/27/18 0200 131/76 - - 120 10 91 %   11/27/18 0100 129/71 - - 123 25 91 %   11/27/18 0005 - - - 123 - -   11/27/18 0000 127/78 97.8  F (36.6  C) Oral 123 24 (!) 89 %   11/26/18 2300 125/82 - - 118 (!) 0 93 %   11/26/18 2200 113/79 - - 113 (!) 0 90 %   11/26/18 2100 120/74 - - 112 (!) 0 92 %   11/26/18 2020 - 97.5  F (36.4  C) Oral - 12 -   11/26/18 2005 - - - 111 - -   11/26/18 2000 116/75 - - 114 21 92 %   11/26/18 1900 110/79 - - 105 (!) 0 92 %   11/26/18 1548 118/75 97.9  F (36.6  C) Axillary 115 16 92 %   11/26/18 1512 - - - 115 - -   11/26/18 1400 125/73 - - 120 23 91 %     Wt Readings from Last 10 Encounters:   11/26/18 216 lb 0.8 oz (98 kg)   11/09/18 233 lb (105.7 kg)     Obese male lying in bed, appears comfortable.  On 12 lpm via Oxykmask  Somnolent, but when he opens his eyes, he indicates that he has heard and understood my words  Sclera anicteric, PERRL  OP:  barely opened mouth, but I couldn't see thrush or lesions on distal tongue; palate and buccal mucosa not visualized  CV RRR tachy  Lungs clear, no cough today  Abd obese, NT, hypoactive bowel sounds   MSK: minimal spontaneous movements  Ext warm, without edema   Psych: though somnolent, appears to comprehend, calm    Intake/Output Summary (Last 24 hours) at 11/27/18 1144  Last data filed at 11/27/18 0800   Gross per 24 hour   Intake             1715 ml   Output             1685 ml   Net               30 ml         ROUTINE ICU LABS (Last four results)  CMP  Recent Labs  Lab 11/27/18  0620 11/26/18  0455 11/26/18  0045 11/25/18  1659    136 137 136   POTASSIUM 3.6 3.5 4.0 4.0   CHLORIDE 92* 93* 97 98   CO2 36* 34* 33* 30   ANIONGAP 7 9 7 8   * 244* 256* 281*   BUN 27 25 24 28   CR 1.32* 1.19 1.15 1.08   GFRESTIMATED 60* 68 71 76   GFRESTBLACK 73 82 86 >90   MED 11.6* 11.7* 11.1* 10.9*   PROTTOTAL  --  6.6* 6.2* 5.8*   ALBUMIN  --  2.6* 2.5* 2.3*   BILITOTAL  --  0.4 0.4 0.2   ALKPHOS  --  49 47 45   AST  --  16 17 14   ALT  --  18 18 16     CBC  Recent Labs  Lab 11/26/18  0455 11/26/18  0045 11/23/18  1801   WBC 10.1 9.5 11.1*   RBC 5.08 4.87 5.34   HGB 14.2 13.9 15.2   HCT 43.5 41.7 45.8   MCV 86 86 86   MCH 28.0 28.5 28.5   MCHC 32.6 33.3 33.2   RDW 12.7 12.8 12.9    182 192     INRNo lab results found in last 7 days.  Arterial Blood GasNo lab results found in last 7 days.    Recent Results (from the past 48 hour(s))   CT Head w/o Contrast    Narrative    CT SCAN OF THE HEAD WITHOUT CONTRAST   11/25/2018 5:20 PM     HISTORY: Rule out ICH.    TECHNIQUE:  Axial images of the head and coronal reformations without  IV contrast material.  Radiation dose for this scan was reduced using  automated exposure control, adjustment of the mA and/or kV according  to patient size, or iterative reconstruction technique.    COMPARISON: 10/17/2018    FINDINGS: During the interval, there has been a right  frontal  craniotomy for tumor resection. There is some volume loss at the site  of tumor resection. There are also bilateral areas of low density  consistent with vasogenic edema in both hemispheres with other nodular  lesion seen in the left hemisphere measuring approximately 1.2 and 0.9  cm in size. The amounts of vasogenic edema have significantly  progressed since the prior study. Ventricles are not enlarged. There  is no evidence for intracranial hemorrhage or acute infarct.      Impression    IMPRESSION:  1. Interval right frontal craniotomy for tumor resection.  2. Increasing areas of vasogenic edema in both hemispheres with  associated underlying lesions. The pattern suggests progressive  metastatic disease. Clinical correlation suggested.  3. No evidence for intracranial hemorrhage or midline shift.     JOSIE SKELTON MD   Chest 2 Views*    Narrative    CHEST 2 VIEWS   11/26/2018 1:13 AM     HISTORY: New hypoxia.    COMPARISON: 11/23/2018 - CT chest, abdomen and pelvis.    FINDINGS: Moderate-sized mass-like opacities in bilateral lung bases  in addition to a few smaller patchy nodular opacities scattered within  the upper and mid lungs bilaterally, not convincingly changed since  the comparison CT scan dated 11/23/2018, allowing for differences in  technique. Normal-sized cardiac silhouette. Right anterior chest wall  port with catheter entering the right internal jugular vein and distal  tip in the right atrium.      Impression    IMPRESSION:   1. No convincing interval change since 10/23/2018.  2. A few opacities scattered within both lungs, including dominant  moderate-sized mass-like opacities in bilateral lung bases.    JARAD NICHOLE MD   CT Chest pulmonary embolism w contrast    Narrative    CT CHEST WITH CONTRAST   11/26/2018 2:41 AM     HISTORY: New hypoxia.    COMPARISON: 11/23/2018 - CT chest, abdomen and pelvis.    TECHNIQUE: Following the uneventful administration of 80 mL  Isovue-370  intravenous contrast, helical sections were acquired through the lungs  according to the pulmonary embolism protocol. Coronal reconstructions  were generated. Radiation dose for this scan was reduced using  automated exposure control, adjustment of the mA and/or kV according  to the patient's size, or iterative reconstruction technique.    FINDINGS: No visualized pulmonary embolus. The thoracic aorta is  normal in caliber without dissection. Right anterior chest wall port  with catheter entering the right internal jugular vein and distal tip  in the right atrium.    Several mass-like opacities scattered within both lungs, including a  dominant 11 cm opacity in the right lung base, not convincingly  changed since 11/23/2018. Mild groundglass opacities scattered with in  the central aspects of both lungs, new. Tiny left pleural effusion  again noted. No right pleural or pericardial effusion. Several mildly  and moderately enlarged mediastinal and right hilar lymph nodes,  unchanged. For example, there is a 2.9 x 2.8 cm right hilar lymph node  (series 4,  image 69). Patchy lucencies within bilateral humeral heads  again noted.    Scan through the upper abdomen is significant for a complex 11 x 8 cm  fluid collection abutting the posterior wall of the stomach (series 4,  image 134) that has moderately increased in size. Again noted are  moderate diffuse fatty infiltration of the liver, moderate nodularity  of the right adrenal gland, prior left nephrectomy, a 9 cm  heterogeneous mass centered in the left renal fossa and mild  splenomegaly.      Impression    IMPRESSION:   1. Mild groundglass opacities in the central aspects of both lungs  that could represent atelectasis or pulmonary edema.  2. No visualized pulmonary embolus.  3. Extensive metastatic neoplasm in the chest including several  bilateral pulmonary masses, several enlarged thoracic lymph nodes, a  probable right adrenal metastasis, probable  bilateral humeral  metastasis, and a large metastasis in the left renal fossa, not  convincingly changed since the recent study dated 11/23/2018.  4. Nonspecific moderate-sized irregular-shaped 11 x 8 cm fluid  collection in the upper left hemiabdomen and abutting the posterior  wall of the stomach, moderately increased in size.    JARAD NICHOLE MD

## 2018-11-27 NOTE — PROGRESS NOTES
LA fired this am, blood drawn and results were positive at 2.6.  Helena NAGEL , provider on call web paged these results awaiting orders.  Patient also complaining of pain first time this 12 hour shift having pain states because he has been coughing more this am despite Tesalon pearls given at 0430 this am.  Will continue to monitor closely.

## 2018-11-27 NOTE — PROGRESS NOTES
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          Declining functional status in patient with metastatic cancer  Overall not doing well since starting whole brain radiation. Poor oral intake, dehydrated, generally weak. Family is concerned about his safety at home while they are gone at work. Patient was a direct admit request from Dr. Quijano (radiation oncologist) for further evaluation.  Remains deconditioned and weak, remains in ICU due to hypoxemia.      LISSETTE De La Paz CNP met with patient and his mom this morning to review palliative care issues and to discuss plans for future care and end-of-life planning.  I met with patient and his mom this evening to discuss the same.  Patient's primary goal is to get home; he will be going to live with his mother as he cannot live independently as ill as he is.  He is currently Full Code status by his own decision, but is now undecided about that.  There is a plan in place for a team from [facility] to meet with him and his mom here in-house at 1000 tomorrow.  He is aware that Hospice implies end-of-life, and that his participation would include DNR/DNI status.  His mom endorses DNR/DNI, but defers to Raphael to make his own decision.  At the end of this visit, we have decided that I should check in at the end of tomorrow's Hospice meeting.  If Raphael enrolls and wants to go home, we may be able to arrange that for tomorrow afternoon, or if not, Thursday 11/29.     - No longer on IVF; monitor oral intake.  -  Nutrition consult recommends high calorie/high protein supplements recommended in addition to general diet, which are ordered.  - Will follow-up after Hospice meeting tomorrow, details above.          Abdominal pain, generalized  Poor oral intake  Postprandial fullness  On admission reported inconsistent bowel movements, nausea, and vomiting. Develops postprandial fullness after just a few bites of food. Afebrile, minimal leukocytosis (WBC 11.1) upon admission.  CT abdomen 11/23/18 showed significant increased in tumor burden in the lungs, abd lymph nodes, left kidney bed and left adrenal gland, but no evidence of bowel obstruction.  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  Currently reports no abdominal pain, no nausea or recent emesis, abdominal exam unremarkable.  Able to take in some food, and Boost.  - Some improvement in abdominal symptoms with dronabinol; dose increased today, 11/27.  - Palliative Care has qualified him for medical marijuana upon discharge.  - Hospice meeting tomorrow.         Hypercalcemia   Etiology not entirely clear, but has lucencies in the bilateral humeral heads on chest CT suggestive of bony mets.  May also be due to a PTHrH from neoplasm; labs pending.  Ca 12.5 on admission, treated with IVF, improved to 10.9 on 11/25/18.  So IVF stopped.  Ca then 11.1-->11.7 this morning after furosemide 40 mg x3 doses yesterday.  Ca ionized 6.6 this morning.  Given continued rise in Ca despite IVF and loop diuretics, I gave him zoledronic acid 4 mg x1 on 11/26/18.  Ca ionized slightly better at 6.2 this morning.  - PTH low, which is a non-specific finding, with PTHrH pending.  - Next zoledronic acid could be given on or after 12/3/18, if needed.     Brain mets with generalized edema  -CT head done 11/25/18:  1. Interval right frontal craniotomy for tumor resection.  2. Increasing areas of vasogenic edema in both hemispheres with  associated underlying lesions. The pattern  suggests progressive  metastatic disease. Clinical correlation suggested.  3. No evidence for intracranial hemorrhage or midline shift.   - Suspect edema described above is due to XRT, but can discuss with Dr Quijano, Radiation Oncology, to get his opinion.  - Remains on dexamethasone 8 mg BID.      Generalized weakness, risk for falls  Will be alone all day starting this week while family is at work. Family concerned about patient's safety with his weakness.  Brain mets likely affecting patient's balance and overall function.   - PT/OT evaluations underway.  - SW consult, will likely benefit from home health care.  - Plan for home care vs Hospice at discharge, details above.            Cough   Chronic cough. Known lung mets. Chest CT on 11/26/18 confirms the present of extensive metastatic disease to both lungs in the form of large bilateral masses.  Cough reported to be mild and non-productive this morning. - low suspicion for pneumonia. Cough managed prior to admission with Tessalon and albuterol without much relief.  - Continue benzonatate and albuterol PRN.          Tachycardia   HR 93 - 123 last 24 hours.  Tachycardia has been mild and persistent since admission.  Cause unclear.  No evidence of PE on chest CT 11/26/18.   - Observe HR.          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 S/P left nephrectomy in June 2018, S/P brain tumor resection on 11/1/18, all through [facility]. Follows with oncology Dr. Rodriguez through [facility], but is doing radiation at [facility], which is closer to home. On day of admission (11/23/2018) he received round 6 of 10 of whole brain radiation. Has been prescribed carozantinib for RCC but has not yet started taking it. Receives decadron 4 mg q 6 hours prior to admission for radiation induced inflammation. On Keppra prior to admission.  No seizure activity seen here since admission.  - Hold prior to admission carozantinib  -  Continue prior to admission dexamethasone, though dose changed from 4 mg Q6H to 8 mg Q12H.  - Continue prior to admission Keppra.          Type 2 diabetes mellitus treated without insulin  Hgb A1c 6.7 on admission.  Managed prior to admission with metformin 1000 mg BID, though is not receiving metformin here in-house; can't find documentation as to why as GFR has been OK.  Resumed metformin at lower than preadmission dose at 500 mg BID on 11/26/18.  Glucose remains consistently high, 194 to 291 last 5 checks.  - Will continue metformin 500 mg BID despite persistent hyperglycemia, as I am concerned that patient's PO intake is likely to decline in near future, which could cause hypoglycemia.  - Continue high dose sliding scale insulin  - Hyper/hypoglycemia protocol.          Essential hypertension with goal blood pressure less than 130/80  Not on antihypertensive medications prior to admission.  BP OK here on no Rx.  - Continue to observe off of medications.          Anxiety disorder due to general medical condition  Stable mood upon admission. Managed prior to admission with Buspar, continue.    Hypoxemic respiratory failure, acute  O2 needs increased over night 11/25-26, transferred to ICU.  BiPAP used for about an hour, patient had difficulties tolerating it so it has been removed.  Currently on O2 per 10 L mask.  Denies dyspnea or respiratory chest pain, finds the mask to be uncomfortable.  CT chest 11/26/18 showed no PE, extensive bilateral masses unchanged, mild perihilar airspace opacities new from prior study possibly representing pulmonary edema.  I haven't wanted to give diuretics given persistent tachycardia.  - Continue O2 at 8 to 10 L/min.  I don't think there is probably much reversible disease in his lungs, so hypoxemia may not improve over time.  If hypoxemia worsens, could give another dose furosemide.  - Can arrange for high flow O2 at home if patient is discharged, though I think we should accept  lower SpO2 as long as he is asymptomatic, even 88%.          Fluids: Saline lock, oral only.  Electrolytes: Monitor  Nutrition: Regular diet with high calorie/high protein supplements, but not eating much.      DVT Prophylaxis: Enoxaparin.  Code Status: Full Code - discussed directly with patient and his family     Dispo:   Hospice meeting in the patient's hospital room tomorrow at 1000.  Will make discharge plans based upon the outcome of that meeting.  Overall plan is for discharge to patient's mother's home, either with Home Care or under Hospice.    Attestation:  I have reviewed today's vital signs, notes, medications, labs and imaging.  Amount of time performed on this follow-up visit: 45 minutes, 25 of which were spent in care coordination and counseling.    Surinder Rivera MD      Interval History   Denies dyspnea at rest.  Cough is less severe, is non-productive, only mildly bothersome.  No respiratory chest pain.  No current nausea, no recent emesis.  Not eating much, reports early satiety.  No chest pain or orthopnea.    Physical Exam   Temp:  [97.5  F (36.4  C)-98.5  F (36.9  C)] 98.5  F (36.9  C)  Heart Rate:  [104-123] 111  Resp:  [0-32] 0  BP: (110-139)/(69-82) 135/69  SpO2:  [89 %-94 %] 93 %    Weights:   Vitals:    11/24/18 0623 11/25/18 0607 11/26/18 0300   Weight: 79.4 kg (175 lb 0.7 oz) 75.5 kg (166 lb 7.2 oz) 98 kg (216 lb 0.8 oz)    Body mass index is 31.91 kg/(m^2).    GENERAL: Pleasant man, looks fatigued, but in no distress.  EYES: Eyes grossly normal to inspection, extraocular movements intact  HENT: Deferred due to O2 mask.  NECK: Trachea midline, no stridor.    RESP: No accessory muscle use.  Symmetrical breath sounds.  Scattered areas of coarse crackles on inspiration over both sides, loudest left anterior.  Expiration not prolonged, no wheeze.  CV: Regular rate and rhythm, mildly tachycardic.  Normal S1 S2, no murmur or extra sound.  No lower extremity edema.  ABDOMEN: Soft,  non-tender, no guarding.  Liver and spleen not palpable, no masses palpable.  Bowel sounds positive.  MS: No bony deformities noted.  No red or inflamed joints.  SKIN: Warm and dry, no rashes.  NEURO: Alert, oriented, conversant.  Cranial nerves II - XII grossly intact.  No gross motor or sensory deficits.    PSYCH: Calm, alert, conversant.  Able to articulate logical thoughts, no tangential thoughts, no hallucinations or delusions.  Affect normal.        Data     Recent Labs  Lab 11/27/18  0620 11/26/18  0455 11/26/18  0045  11/23/18  1801   WBC  --  10.1 9.5  --  11.1*   HGB  --  14.2 13.9  --  15.2   MCV  --  86 86  --  86   PLT  --  190 182  --  192    136 137  < > 136   POTASSIUM 3.6 3.5 4.0  < > 4.5   CHLORIDE 92* 93* 97  < > 101   CO2 36* 34* 33*  < > 24   BUN 27 25 24  < > 33*   CR 1.32* 1.19 1.15  < > 1.10   ANIONGAP 7 9 7  < > 11   MED 11.6* 11.7* 11.1*  < > 12.5*   * 244* 256*  < > 239*   ALBUMIN  --  2.6* 2.5*  < >  --    PROTTOTAL  --  6.6* 6.2*  < >  --    BILITOTAL  --  0.4 0.4  < >  --    ALKPHOS  --  49 47  < >  --    ALT  --  18 18  < >  --    AST  --  16 17  < >  --    < > = values in this interval not displayed.      Recent Labs  Lab 11/27/18  1151 11/27/18  0639 11/27/18  0620 11/27/18  0224 11/26/18  2126 11/26/18  1623 11/26/18  1123  11/26/18  0455 11/26/18  0045  11/25/18  1659  11/23/18  1801   GLC  --   --  234*  --   --   --   --   --  244* 256*  --  281*  --  239*   * 214*  --  252* 194* 253* 301*  < >  --   --   < >  --   < >  --    < > = values in this interval not displayed.     Unresulted Labs Ordered in the Past 30 Days of this Admission     Date and Time Order Name Status Description    11/26/2018 0000 PTH Related Peptide Test In process            Imaging  No results found for this or any previous visit (from the past 24 hour(s)).     I reviewed all new labs and imaging results over the last 24 hours. I personally reviewed no images or EKGs  today.    Medications       atorvastatin  10 mg Oral At Bedtime     bisacodyl  10 mg Rectal Once     busPIRone  10 mg Oral BID     dexamethasone  8 mg Oral Q12H MONICA     dronabinol  5 mg Oral BID     enoxaparin  40 mg Subcutaneous Q24H     heparin  5 mL Intracatheter Q28 Days     heparin lock flush  5-10 mL Intracatheter Q24H     insulin aspart  1-10 Units Subcutaneous TID AC     insulin aspart  1-7 Units Subcutaneous At Bedtime     levETIRAcetam  500 mg Oral BID     metFORMIN  500 mg Oral BID w/meals     methylphenidate  5 mg Oral BID     pantoprazole  40 mg Oral QAM AC     senna-docusate  1 tablet Oral BID    Or     senna-docusate  2 tablet Oral BID       Surinder Rivera MD

## 2018-11-27 NOTE — PROGRESS NOTES
SPIRITUAL HEALTH SERVICES  SPIRITUAL ASSESSMENT Progress Note  [facility] [place] - ICU    I checked in with pt, Raphael, as a follow up to his visit with  on Saturday.  He was being visited by many family members and stated that he wasn't doing very well.  He declined a visit at this time.  I reiterated the availability of spiritual support at his request.    Tony Burgess M.A., [facility]  Staff   [facility]  Office: [phone]  Cell: [phone]  Pager [phone]

## 2018-11-27 NOTE — PLAN OF CARE
Problem: ARDS (Acute Resp Distress Syndrome) (Adult)  Goal: Signs and Symptoms of Listed Potential Problems Will be Absent, Minimized or Managed (ARDS)  Signs and symptoms of listed potential problems will be absent, minimized or managed by discharge/transition of care (reference ARDS (Acute Resp Distress Syndrome) (Adult) CPG).   Outcome: No Change  Saturations did dip to 88% about MN and did increase oxygen flow to oxi-plus mask to 12 LPM.  Questioned if having pain or more anxious.  Denies both,  Cough is infrequent at this time and states feeling very thirsty,  Offered and assisted with taking sips of water,  More awake and alert at 0400 assessment, states cough is bothering him and did medicate with Tesalon pearls at this time.  Since increase in liter flow of oxygen has been maintaining saturations better and HR although still high is maintaining in the low 110's.  SBP has remained stable in the 110's to 120's.  Urine output is down since IV fluids discontinued and Lasix discontinued also.  States feeling like he was able to rest some tonight.  Will continue to monitor closely.

## 2018-11-27 NOTE — PROGRESS NOTES
Episode of nausea that resolved with Zofran IV 4 mg.  After 15 minutes was able to tolerate evening medications and keep them down.  Continues with Tachycardia rate about 120 consistently, respirations are shallow at times with oxi-plus mask at 10 LPM which is the liter flow for entire day.  Questioned patient how he was feeling, States just feeling exhausted and just wants to sleep.  Talked about trying to use BiPAP if so exhausted to allow to just and not work so hard on breathing.  Refused states he actually felt worse with high flow oxygen and BiPAP last night and would not consider their use tonight.  Will continue to monitor closely. rate

## 2018-11-27 NOTE — PROGRESS NOTES
Pt repositioned after lunch but had some increased back pain.  Pt given ice pack with plan to try another pain med if that was not working.  Pt is resting comfortably 30 minutes after ice pack.  Will again offer bed bath and suppository when patient wakes.  He refused after lunch, saying he was too tired.

## 2018-11-27 NOTE — PROGRESS NOTES
Pt was up to chair at 1630 with gait belt and assistance of two.  Did feel weak, slightly dizzy but wanted to continue to take a few steps to chair.  Pt was not able to walk back to bed, stating he felt too weak.  Pt assisted to bed using ceiling lift. Continues to report that he feels sleepy.

## 2018-11-28 LAB
ALBUMIN SERPL-MCNC: 2.3 G/DL (ref 3.4–5)
ALP SERPL-CCNC: 47 U/L (ref 40–150)
ALT SERPL W P-5'-P-CCNC: 17 U/L (ref 0–70)
ANION GAP SERPL CALCULATED.3IONS-SCNC: 6 MMOL/L (ref 3–14)
AST SERPL W P-5'-P-CCNC: 14 U/L (ref 0–45)
BILIRUB SERPL-MCNC: 0.4 MG/DL (ref 0.2–1.3)
BUN SERPL-MCNC: 28 MG/DL (ref 7–30)
CALCIUM SERPL-MCNC: 9.8 MG/DL (ref 8.5–10.1)
CHLORIDE SERPL-SCNC: 92 MMOL/L (ref 94–109)
CO2 SERPL-SCNC: 35 MMOL/L (ref 20–32)
CREAT SERPL-MCNC: 1.1 MG/DL (ref 0.66–1.25)
GFR SERPL CREATININE-BSD FRML MDRD: 74 ML/MIN/1.7M2
GLUCOSE BLDC GLUCOMTR-MCNC: 277 MG/DL (ref 70–99)
GLUCOSE BLDC GLUCOMTR-MCNC: 285 MG/DL (ref 70–99)
GLUCOSE BLDC GLUCOMTR-MCNC: 314 MG/DL (ref 70–99)
GLUCOSE BLDC GLUCOMTR-MCNC: 327 MG/DL (ref 70–99)
GLUCOSE SERPL-MCNC: 256 MG/DL (ref 70–99)
POTASSIUM SERPL-SCNC: 3.9 MMOL/L (ref 3.4–5.3)
PROT SERPL-MCNC: 5.9 G/DL (ref 6.8–8.8)
SODIUM SERPL-SCNC: 133 MMOL/L (ref 133–144)

## 2018-11-28 PROCEDURE — 40000275 ZZH STATISTIC RCP TIME EA 10 MIN

## 2018-11-28 PROCEDURE — 99356 ZZC PROLONGED SERV,INPATIENT,1ST HR: CPT | Performed by: NURSE PRACTITIONER

## 2018-11-28 PROCEDURE — 25000132 ZZH RX MED GY IP 250 OP 250 PS 637: Performed by: PHYSICIAN ASSISTANT

## 2018-11-28 PROCEDURE — 80053 COMPREHEN METABOLIC PANEL: CPT | Performed by: NURSE PRACTITIONER

## 2018-11-28 PROCEDURE — 25000132 ZZH RX MED GY IP 250 OP 250 PS 637: Performed by: NURSE PRACTITIONER

## 2018-11-28 PROCEDURE — 99233 SBSQ HOSP IP/OBS HIGH 50: CPT | Performed by: NURSE PRACTITIONER

## 2018-11-28 PROCEDURE — 00000146 ZZHCL STATISTIC GLUCOSE BY METER IP

## 2018-11-28 PROCEDURE — 99232 SBSQ HOSP IP/OBS MODERATE 35: CPT

## 2018-11-28 PROCEDURE — 25000131 ZZH RX MED GY IP 250 OP 636 PS 637: Performed by: PHYSICIAN ASSISTANT

## 2018-11-28 PROCEDURE — 99358 PROLONG SERVICE W/O CONTACT: CPT | Performed by: NURSE PRACTITIONER

## 2018-11-28 PROCEDURE — 25000128 H RX IP 250 OP 636: Performed by: FAMILY MEDICINE

## 2018-11-28 PROCEDURE — 12000011 ZZH R&B MS OVERFLOW

## 2018-11-28 PROCEDURE — 25000132 ZZH RX MED GY IP 250 OP 250 PS 637

## 2018-11-28 RX ORDER — DRONABINOL 5 MG/1
5 CAPSULE ORAL
Qty: 90 CAPSULE | Refills: 1 | Status: SHIPPED | OUTPATIENT
Start: 2018-11-28

## 2018-11-28 RX ORDER — METHYLPHENIDATE HYDROCHLORIDE 5 MG/1
5 TABLET ORAL 2 TIMES DAILY
Qty: 10 TABLET | Refills: 0 | Status: SHIPPED | OUTPATIENT
Start: 2018-11-28 | End: 2018-12-04

## 2018-11-28 RX ORDER — CALCIUM CARBONATE 500 MG/1
500 TABLET, CHEWABLE ORAL 3 TIMES DAILY PRN
Status: DISCONTINUED | OUTPATIENT
Start: 2018-11-28 | End: 2018-11-29 | Stop reason: HOSPADM

## 2018-11-28 RX ADMIN — SENNOSIDES AND DOCUSATE SODIUM 2 TABLET: 8.6; 5 TABLET ORAL at 08:56

## 2018-11-28 RX ADMIN — PANTOPRAZOLE SODIUM 40 MG: 40 TABLET, DELAYED RELEASE ORAL at 06:24

## 2018-11-28 RX ADMIN — ENOXAPARIN SODIUM 40 MG: 40 INJECTION SUBCUTANEOUS at 03:09

## 2018-11-28 RX ADMIN — DEXAMETHASONE 8 MG: 4 TABLET ORAL at 08:58

## 2018-11-28 RX ADMIN — DRONABINOL 5 MG: 2.5 CAPSULE ORAL at 15:22

## 2018-11-28 RX ADMIN — HYDROCODONE BITARTRATE AND ACETAMINOPHEN 1 TABLET: 5; 325 TABLET ORAL at 19:48

## 2018-11-28 RX ADMIN — LEVETIRACETAM 500 MG: 500 TABLET, FILM COATED ORAL at 08:57

## 2018-11-28 RX ADMIN — BUSPIRONE HYDROCHLORIDE 10 MG: 10 TABLET ORAL at 19:48

## 2018-11-28 RX ADMIN — METFORMIN HYDROCHLORIDE 500 MG: 500 TABLET ORAL at 16:51

## 2018-11-28 RX ADMIN — INSULIN ASPART 5 UNITS: 100 INJECTION, SOLUTION INTRAVENOUS; SUBCUTANEOUS at 08:59

## 2018-11-28 RX ADMIN — HYDROCODONE BITARTRATE AND ACETAMINOPHEN 1 TABLET: 5; 325 TABLET ORAL at 02:24

## 2018-11-28 RX ADMIN — HYDROCODONE BITARTRATE AND ACETAMINOPHEN 1 TABLET: 5; 325 TABLET ORAL at 11:59

## 2018-11-28 RX ADMIN — LORAZEPAM 0.5 MG: 2 INJECTION INTRAMUSCULAR; INTRAVENOUS at 02:46

## 2018-11-28 RX ADMIN — INSULIN ASPART 6 UNITS: 100 INJECTION, SOLUTION INTRAVENOUS; SUBCUTANEOUS at 16:51

## 2018-11-28 RX ADMIN — DEXAMETHASONE 8 MG: 4 TABLET ORAL at 19:48

## 2018-11-28 RX ADMIN — METHYLPHENIDATE HYDROCHLORIDE 5 MG: 5 TABLET ORAL at 14:32

## 2018-11-28 RX ADMIN — CALCIUM CARBONATE (ANTACID) CHEW TAB 500 MG 500 MG: 500 CHEW TAB at 15:23

## 2018-11-28 RX ADMIN — ATORVASTATIN CALCIUM 10 MG: 10 TABLET, FILM COATED ORAL at 22:39

## 2018-11-28 RX ADMIN — HYDROCODONE BITARTRATE AND ACETAMINOPHEN 1 TABLET: 5; 325 TABLET ORAL at 15:23

## 2018-11-28 RX ADMIN — DRONABINOL 5 MG: 2.5 CAPSULE ORAL at 09:05

## 2018-11-28 RX ADMIN — INSULIN ASPART 8 UNITS: 100 INJECTION, SOLUTION INTRAVENOUS; SUBCUTANEOUS at 11:57

## 2018-11-28 RX ADMIN — SODIUM CHLORIDE, PRESERVATIVE FREE 5 ML: 5 INJECTION INTRAVENOUS at 19:49

## 2018-11-28 RX ADMIN — BUSPIRONE HYDROCHLORIDE 10 MG: 10 TABLET ORAL at 08:58

## 2018-11-28 RX ADMIN — METFORMIN HYDROCHLORIDE 500 MG: 500 TABLET ORAL at 08:58

## 2018-11-28 RX ADMIN — SENNOSIDES AND DOCUSATE SODIUM 2 TABLET: 8.6; 5 TABLET ORAL at 19:48

## 2018-11-28 RX ADMIN — LEVETIRACETAM 500 MG: 500 TABLET, FILM COATED ORAL at 19:48

## 2018-11-28 RX ADMIN — METHYLPHENIDATE HYDROCHLORIDE 5 MG: 5 TABLET ORAL at 09:05

## 2018-11-28 ASSESSMENT — ACTIVITIES OF DAILY LIVING (ADL)
ADLS_ACUITY_SCORE: 12
ADLS_ACUITY_SCORE: 13
ADLS_ACUITY_SCORE: 12

## 2018-11-28 ASSESSMENT — PAIN DESCRIPTION - DESCRIPTORS: DESCRIPTORS: ACHING

## 2018-11-28 NOTE — PROGRESS NOTES
Pt had a care conference with Shane NIELSON NP and [facility] at 10AM: plan for discharge tomorrow at 11AM. Family has been present at bedside throughout most of the day. Pt has been resting in bed throughout the day, declines wanting to get up in the chair. Encouragement needed for repositioning. Verbalized abdominal/back discomfort throughout the day, given a dose of PRN norco and was able to rest on and off following this. Only eating very small amounts with meals, mainly drinking his Boost and small sips of milk/water. Transferred to M/S status this afternoon. Declined wanting to get washed up as he states he had a bed bath late last evening. Call light within reach. Alarms activated and audible.

## 2018-11-28 NOTE — PROGRESS NOTES
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          Declining functional status in patient with metastatic cancer  Overall not doing well since starting whole brain radiation. Poor oral intake, dehydrated, generally weak. Family is concerned about his safety at home while they are gone at work. Patient was a direct admit request from Dr. Quijano (radiation oncologist) for further evaluation.  Remains deconditioned and weak, remains in ICU due to hypoxemia.      LISSETTE De La Paz CNP met with patient and his mom 11/27/18 morning to review palliative care issues and to discuss plans for future care and end-of-life planning.  I met with patient and his mom the same evening to discuss the same.  Patient's primary goal is to get home; he will be going to live with his mother as he cannot live independently as ill as he is.  He is currently Full Code status by his own decision, but is now undecided about that.  There is a plan in place for a team from [facility] to meet with him and his mom here in-house; that meeting occurred at 1000 today, 11/28/18.  Raphael tells me that he has decided to enroll into Hospice.  He cannot be discharged to his mom's home today as Hospice needs more time to get equipment into the home, including a bed.  Discharge to Hospice is planned for 1100 tomorrow.  - Discharge to [facility] at 1100 tomorrow.  - Continue high calorie/high protein supplements recommended in addition to general diet per RD recommendations, which are  ordered.          Abdominal pain, generalized  Poor oral intake  Postprandial fullness  On admission reported inconsistent bowel movements, nausea, and vomiting. Develops postprandial fullness after just a few bites of food. Afebrile, minimal leukocytosis (WBC 11.1) upon admission.  CT abdomen 11/23/18 showed significant increased in tumor burden in the lungs, abd lymph nodes, left kidney bed and left adrenal gland, but no evidence of bowel obstruction.  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  Currently reports no abdominal pain, no nausea or recent emesis, abdominal exam unremarkable.  Able to take in some food, and Boost.  - Some improvement in abdominal symptoms with dronabinol; dose increased 11/27 to 5 mg BID.  - Palliative Care has qualified him for medical marijuana upon discharge.         Hypercalcemia   Etiology not entirely clear, but has lucencies in the bilateral humeral heads on chest CT suggestive of bony mets.  May also be due to a PTHrH from neoplasm; labs pending.  Ca 12.5 on admission, treated with IVF, improved to 10.9 on 11/25/18.  So IVF stopped.  Ca then 11.1-->11.7 this morning after furosemide 40 mg x3 doses yesterday.  Ca ionized 6.6 this morning.  Given continued rise in Ca despite IVF and loop diuretics, I gave him zoledronic acid 4 mg x1 on 11/26/18.  Ca ionized slightly better at 6.2 as of 11/27/18.  - PTH low, which is a non-specific finding, with PTHrH pending.  - Next zoledronic acid could be given on or after 12/3/18, if needed.  - I did not order repeat labs given transition to Hospice.     Brain mets with generalized edema  -CT head done 11/25/18:  1. Interval right frontal craniotomy for tumor resection.  2. Increasing areas of vasogenic edema in both hemispheres with  associated underlying lesions. The pattern suggests progressive  metastatic disease. Clinical correlation suggested.  3. No evidence for intracranial hemorrhage or midline shift.   -  Suspect edema described above is due to XRT, but can discuss with Dr Quijano, Radiation Oncology, to get his opinion.  - Remains on dexamethasone 8 mg BID.      Generalized weakness, risk for falls  Will be alone all day starting this week while family is at work. Family concerned about patient's safety with his weakness.  Brain mets likely affecting patient's balance and overall function.   - PT/OT evaluations can stop given transition to Hospice.          Cough   Chronic cough. Known lung mets. Chest CT on 11/26/18 confirms the present of extensive metastatic disease to both lungs in the form of large bilateral masses.  Cough reported to be mild and non-productive this morning - low suspicion for pneumonia. Cough managed prior to admission with Tessalon and albuterol without much relief.  - Continue benzonatate and albuterol PRN.          Tachycardia   HR 93 - 123 last 24 hours.  Tachycardia has been mild and persistent since admission.  Cause unclear.  No evidence of PE on chest CT 11/26/18.   - Observe HR.          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 S/P left nephrectomy in June 2018, S/P brain tumor resection on 11/1/18, all through [facility]. Follows with oncology Dr. Rodriguez through [facility], but is doing radiation at [facility], which is closer to home. On day of admission (11/23/2018) he received round 6 of 10 of whole brain radiation. Has been prescribed carozantinib for RCC but has not yet started taking it. Receives decadron 4 mg q 6 hours prior to admission for radiation induced inflammation. On Keppra prior to admission.  No seizure activity seen here since admission.  - Hold prior to admission carozantinib  - Continue prior to admission dexamethasone, though dose changed from 4 mg Q6H to 8 mg Q12H.  - Continue prior to admission Keppra.          Type 2 diabetes mellitus treated without insulin  Hgb A1c 6.7 on admission.  Managed prior to  admission with metformin 1000 mg BID, though is not receiving metformin here in-house; can't find documentation as to why as GFR has been OK.  Resumed metformin at lower than preadmission dose at 500 mg BID on 11/26/18.  Glucose remains consistently high, 256 to 327 last 5 checks.  - Will continue metformin 500 mg BID without dose increase despite persistent hyperglycemia, as I am concerned that patient's PO intake is likely to decline in near future, which could cause hypoglycemia.  - Continue high dose sliding scale insulin, though that will stop at discharge.  - Hyper/hypoglycemia protocol.          Essential hypertension with goal blood pressure less than 130/80  Not on antihypertensive medications prior to admission.  BP OK here on no Rx.  - Continue to observe off of medications.          Anxiety disorder due to general medical condition  Stable mood upon admission. Managed prior to admission with Buspar, continue.    Hypoxemic respiratory failure, acute  O2 needs increased over night 11/25-26, transferred to ICU.  BiPAP used for about an hour, patient had difficulties tolerating it so it has been removed.  Oxygenation has gradually improved; currently on O2 per 5 L cannula, SpO2 91-94%.  Denies dyspnea or respiratory chest pain.  CT chest 11/26/18 showed no PE, extensive bilateral masses unchanged, mild perihilar airspace opacities new from prior study possibly representing pulmonary edema.  I haven't wanted to give diuretics given persistent tachycardia.  - Continue O2 per cannula, current flow of 5 L/min is adequate.  When patient is discharged to Hospice, though I think we should accept lower SpO2 as long as he is asymptomatic, even in the 80's.          Fluids: Saline lock, oral only.  Electrolytes: Monitor  Nutrition: Regular diet with high calorie/high protein supplements, but not eating much.  Puentes remains in place, patient elects to be discharged with Puentes in.      DVT Prophylaxis: Enoxaparin.  Will  "stop on hospital discharge.  Code Status: Will convert to DNR/DNI given conversion to Hospice.     Dispo:   Discharge to patient's mother's home planned for 1100 tomorrow, where he will be enrolled in [facility].    Attestation:  I have reviewed today's vital signs, notes, medications, labs and imaging.  Amount of time performed on this follow-up visit: 20 minutes, 15 of which were spent in care coordination and counseling.    Surinder Rivera MD      Interval History   Denies dyspnea at rest.  Cough is less severe, is non-productive, only mildly bothersome.  No respiratory chest pain.  No current nausea, no recent emesis.  Not eating much, reports early satiety.  No chest pain or orthopnea.  Only current pain is \"low back strain\" for which he was just medicated, and which is improving.    Physical Exam   Temp:  [97.9  F (36.6  C)-98.5  F (36.9  C)] 98.5  F (36.9  C)  Heart Rate:  [] 111  Resp:  [10-27] 22  BP: (104-139)/(54-82) 123/75  SpO2:  [91 %-98 %] 94 %    Weights:   Vitals:    11/25/18 0607 11/26/18 0300 11/28/18 0623   Weight: 75.5 kg (166 lb 7.2 oz) 98 kg (216 lb 0.8 oz) 96 kg (211 lb 10.3 oz)    Body mass index is 31.25 kg/(m^2).    GENERAL: Pleasant man, looks fatigued, but in no distress.  EYES: Eyes grossly normal to inspection, extraocular movements intact  HENT: Nares patent bilaterally, no discharge.  NECK: Trachea midline, no stridor.    RESP: No accessory muscle use.  Symmetrical breath sounds.  Scattered areas of coarse crackles on inspiration over both sides, less than yesterday, loudest left anterior.  Expiration not prolonged, no wheeze.  CV: Regular rate and rhythm, mildly tachycardic.  Normal S1 S2, no murmur or extra sound.  No lower extremity edema.  ABDOMEN: Soft, non-tender, no guarding.  Liver and spleen not palpable, no masses palpable.  Bowel sounds positive.  MS: No bony deformities noted.  No red or inflamed joints.  SKIN: Warm and dry, no rashes.  NEURO: Appears " mildly somnolent, but is oriented, conversant.  Cranial nerves II - XII grossly intact.  No gross motor or sensory deficits.    PSYCH: Calm, mildly somnolent, conversant.  Able to articulate logical thoughts, no tangential thoughts, no hallucinations or delusions.          Data     Recent Labs  Lab 11/28/18  0610 11/27/18  0620 11/26/18  0455 11/26/18  0045  11/23/18  1801   WBC  --   --  10.1 9.5  --  11.1*   HGB  --   --  14.2 13.9  --  15.2   MCV  --   --  86 86  --  86   PLT  --   --  190 182  --  192    135 136 137  < > 136   POTASSIUM 3.9 3.6 3.5 4.0  < > 4.5   CHLORIDE 92* 92* 93* 97  < > 101   CO2 35* 36* 34* 33*  < > 24   BUN 28 27 25 24  < > 33*   CR 1.10 1.32* 1.19 1.15  < > 1.10   ANIONGAP 6 7 9 7  < > 11   MED 9.8 11.6* 11.7* 11.1*  < > 12.5*   * 234* 244* 256*  < > 239*   ALBUMIN 2.3*  --  2.6* 2.5*  < >  --    PROTTOTAL 5.9*  --  6.6* 6.2*  < >  --    BILITOTAL 0.4  --  0.4 0.4  < >  --    ALKPHOS 47  --  49 47  < >  --    ALT 17  --  18 18  < >  --    AST 14  --  16 17  < >  --    < > = values in this interval not displayed.      Recent Labs  Lab 11/28/18  1145 11/28/18  0610 11/28/18  0200 11/27/18  2156 11/27/18  1702 11/27/18  1151 11/27/18  0639 11/27/18  0620  11/26/18  0455 11/26/18  0045  11/25/18  1659  11/23/18  1801   GLC  --  256*  --   --   --   --   --  234*  --  244* 256*  --  281*  --  239*   *  --  277* 285* 281* 291* 214*  --   < >  --   --   < >  --   < >  --    < > = values in this interval not displayed.     Unresulted Labs Ordered in the Past 30 Days of this Admission     Date and Time Order Name Status Description    11/26/2018 0000 PTH Related Peptide Test In process            Imaging  No results found for this or any previous visit (from the past 24 hour(s)).     I reviewed all new labs and imaging results over the last 24 hours. I personally reviewed no images or EKGs today.    Medications       atorvastatin  10 mg Oral At Bedtime     busPIRone  10 mg  Oral BID     dexamethasone  8 mg Oral Q12H MONICA     dronabinol  5 mg Oral BID     enoxaparin  40 mg Subcutaneous Q24H     heparin  5 mL Intracatheter Q28 Days     heparin lock flush  5-10 mL Intracatheter Q24H     insulin aspart  1-10 Units Subcutaneous TID AC     insulin aspart  1-7 Units Subcutaneous At Bedtime     levETIRAcetam  500 mg Oral BID     metFORMIN  500 mg Oral BID w/meals     methylphenidate  5 mg Oral BID     pantoprazole  40 mg Oral QAM AC     senna-docusate  1 tablet Oral BID    Or     senna-docusate  2 tablet Oral BID       Surinder Rivera MD

## 2018-11-28 NOTE — PROGRESS NOTES
After repositioning pt to his right side at 0200 he just couldn't get comfortable, in fact the more we tried the worse his pain got.  He is experiencing severe pain in his left side towards his back between his rib cage and his hip.  Pt was finally comfortable after receiving one Norco po, 0.5 mg of IV Ativan and an Aqua-K applied to his left back area. This writer explained the importance of keeping his pain under control.  Pt then expressed his concerns of taking pain med's and getting constipated.  This writer acknowledged his concerns but also explained that there are medications to help keep him regular.  Pt verbalized an understanding.  Will continue to monitor close for changes.

## 2018-11-28 NOTE — PLAN OF CARE
Problem: Patient Care Overview  Goal: Plan of Care/Patient Progress Review  Outcome: Improving  Pt has been able to maintain his sat's on 4 L per NC, lung sounds are diminished t/o with some fine crackles in the bases.  Pt was able to sleep once his pain was under control.  HR also improved, it went from the low 100's down to the 90's.  Will continue to monitor close for changes.

## 2018-11-28 NOTE — PROGRESS NOTES
Pt has been maintaining his sat's in the mid 90's on 10 L oxymizer canula, several time the canula wasn't even in his nose.  Will try pt on a regular NC on 4 L and monitor his oxygen sat's.  Pt is aware that if he doesn't maintain his sat's >90% he would have to go back on a oxymizer.  Pt verbalized an understanding, will continue to monitor.

## 2018-11-28 NOTE — PROGRESS NOTES
Palliative Care Follow-up Clinic Note  Date of Admission:  11/23/2018   Visit Date:  November 28, 2018        HISTORY of PRESENT ILLNESS:  Raphael Marin is a 38 year old year old male admitted with renal cell carcinoma discovered in May of this year; it was metastatic to lymph and lung at the time of diagnosis, and later metastasized to the brain.  He presented here on 11/23 with weakness, falls, anorexia, failure to thrive.  He is currently undergoing brain radiation with 4 additional treatments pending, and with plans to initiate Cabozantinib, a TKI, upon completion of radiation.  He was referred to Palliative Care for certification for medical cannabis .          ASSESSMENT & PLAN:      # Metastatic renal cell carcinoma (lung, brain, lymph)  # Anorexia, weakness, weight loss; mother reports appetite yesterday improved after start of Marinol 2.5 mg and Remeron at HS  > certified Raphael as eligible for medical cannabis  > 11/27: appetite poor, possibly r/t somnolence.  Increased Dronabinol from 2.5 to 5 mg  > 11/28: nursing indicates he is eating only ~ 25% of meals; still better than before.  Mother has protein powder at home. Patient aware that he can disregard checking blood sugars etc as he'll be on hospice.      # Constipation  > upon discharge, replace colace with Senna, and continue daily Miralax.  PRN Bisacodyl.   > 11/27: feels constipated.  Bisacodyl r/s today  > 11/28: bowels moved yesterday; advised Raphael that his intermittent abdominal pain might be related to constipation.      # Recurrent Nausea; has no antiemetic Rx's at home.  Was given 2 doses Zofran yesterday, and one today thus far.   > see Discharge instructions      # Somnolence possibly 2o hypercalcemia of malignancy.  Zometa given yesterday.  Doesn't meet criteria for subcutaneous miacalcin.  No IV fluids running.  > Ritalin 5 mg bid with 2nd dose of the day no later than 2pm  > 11/28: actually awake today!  Able to participate in meeting with  [facility], family.  Will discharge on Ritalin 5 bid     # Advance Care Planning:  > Code status:  Full Code     11/27: discussed with patient and mother again.  He is undecided.  I told him he could think about it, but I also advised his mother to ask him if he would like her to make the decision for him, and she supports DNR/DNI.  I'll revisit this again tomorrow.  > Health Care Directive: NO  > POLST:  No; mother is the default decision make should he lose capacity as his father is dead.      # Goals/Dispo:  Wants to go home so he can resume radiation and start oral chemo upon completion of radiation, as previously planned  > 11/27: mother tells me she wants to take him home with hospice care; we can't act on this until he expresses his agreement so will review this with him tomorrow.  > 11/28: stepfather needs one more day to prepare their home for the equipment that hospice plans to deliver tomorrow morning.  I sense that he is anxious about this as well.     Thank you for the opportunity to be of service to this patient and family.      Shane Guerrero (Ann) CNP  Palliative Care  Private cell:  [phone]       Face to face:   1000 - 1110, 70 min, > 50% spent meeting with patient, family and [facility] hosp[facility] reviewing needs and plans for discharge, expected tomorrow.  Non face to face:  4940-3714, 45 min, > 50% spent reviewing and writing prescriptions in anticipation of discharge and coordinating care with  attending, nursing and [facility] and writing dishcarge instructions.     ========================    SUBJECTIVE:  Bowels moved!  Awake.  Still quite weak; has decided to NOT continue with radiation.  Also doesn't think he's strong enough to stand and take the 3 steps needed to enter the front door, so [facility] is making arrangements for a stretcher transport home.  Step father asked for 2 more days so he has time to prepare the house; I advised him we could give him one more day only  due to insurance restrictions.  Raphael wants hardin to be left in so he doesn't have to stand to urinate.  Hospice plans to deliver BSC and hospital bed and wheelchair.      ROS:  As described in HPI and Subjective.  Otherwise, ALL are negative.    MEDICATIONS:  No Known Allergies  Scheduled meds:    atorvastatin  10 mg Oral At Bedtime     busPIRone  10 mg Oral BID     dexamethasone  8 mg Oral Q12H MONICA     dronabinol  5 mg Oral BID     enoxaparin  40 mg Subcutaneous Q24H     heparin  5 mL Intracatheter Q28 Days     heparin lock flush  5-10 mL Intracatheter Q24H     insulin aspart  1-10 Units Subcutaneous TID AC     insulin aspart  1-7 Units Subcutaneous At Bedtime     levETIRAcetam  500 mg Oral BID     metFORMIN  500 mg Oral BID w/meals     methylphenidate  5 mg Oral BID     pantoprazole  40 mg Oral QAM AC     senna-docusate  1 tablet Oral BID    Or     senna-docusate  2 tablet Oral BID     PRN meds:  acetaminophen, acetaminophen, albuterol, benzonatate, bisacodyl **OR** bisacodyl **OR** bisacodyl, calcium carbonate, glucose **OR** dextrose **OR** glucagon, heparin lock flush, HYDROcodone-acetaminophen, HYDROmorphone, lidocaine 4%, lidocaine 4%, lidocaine (buffered or not buffered), LORazepam, magnesium citrate, melatonin, naloxone, ondansetron **OR** ondansetron, polyethylene glycol, prochlorperazine **OR** prochlorperazine **OR** prochlorperazine, sodium chloride (PF), sodium chloride (PF)      OBJECTIVE:  Patient Vitals for the past 24 hrs:   BP Temp Temp src Heart Rate Resp SpO2 Weight   11/28/18 1500 118/70 - - 103 25 94 % -   11/28/18 1400 118/70 - - 103 25 - -   11/28/18 1200 116/73 - - 108 18 94 % -   11/28/18 1000 123/75 - - 111 22 94 % -   11/28/18 0800 115/73 98.5  F (36.9  C) Oral 104 16 91 % -   11/28/18 0623 - - - - - - 211 lb 10.3 oz (96 kg)   11/28/18 0600 117/82 - - 101 24 91 % -   11/28/18 0500 - - - - - 93 % -   11/28/18 0400 111/78 - - 93 23 92 % -   11/28/18 0330 - - - - - 92 % -   11/28/18 0300 -  - - - - 92 % -   11/28/18 0200 117/67 - - 101 10 93 % -   11/28/18 0100 106/54 - - 97 20 95 % -   11/28/18 0000 104/60 - - 96 22 91 % -   11/27/18 2300 107/61 98.2  F (36.8  C) Oral 96 25 91 % -   11/27/18 2200 114/78 - - 98 20 95 % -   11/27/18 2100 109/66 - - 98 27 95 % -   11/27/18 2000 104/67 - - 105 22 96 % -   11/27/18 1937 107/69 98.1  F (36.7  C) Oral 106 26 98 % -   11/27/18 1900 - - - - - 96 % -   11/27/18 1800 - - - - 22 94 % -      Wt Readings from Last 10 Encounters:   11/28/18 211 lb 10.3 oz (96 kg)   11/09/18 233 lb (105.7 kg)     Awake!  Daughter, mother, stepfather and 2 hospice reps at his bedside  Appears in good spirits, somewhat anxious  CV RRR  Lungs diminished  Abd obese, hypoactive bowel sounds   Ext warm, without edema     Intake/Output Summary (Last 24 hours) at 11/28/18 1702  Last data filed at 11/28/18 1400   Gross per 24 hour   Intake              700 ml   Output             1700 ml   Net            -1000 ml         ROUTINE ICU LABS (Last four results)  CMP  Recent Labs  Lab 11/28/18  0610 11/27/18  0620 11/26/18  0455 11/26/18  0045 11/25/18  1659    135 136 137 136   POTASSIUM 3.9 3.6 3.5 4.0 4.0   CHLORIDE 92* 92* 93* 97 98   CO2 35* 36* 34* 33* 30   ANIONGAP 6 7 9 7 8   * 234* 244* 256* 281*   BUN 28 27 25 24 28   CR 1.10 1.32* 1.19 1.15 1.08   GFRESTIMATED 74 60* 68 71 76   GFRESTBLACK >90 73 82 86 >90   MED 9.8 11.6* 11.7* 11.1* 10.9*   PROTTOTAL 5.9*  --  6.6* 6.2* 5.8*   ALBUMIN 2.3*  --  2.6* 2.5* 2.3*   BILITOTAL 0.4  --  0.4 0.4 0.2   ALKPHOS 47  --  49 47 45   AST 14  --  16 17 14   ALT 17  --  18 18 16     CBC  Recent Labs  Lab 11/26/18  0455 11/26/18  0045 11/23/18  1801   WBC 10.1 9.5 11.1*   RBC 5.08 4.87 5.34   HGB 14.2 13.9 15.2   HCT 43.5 41.7 45.8   MCV 86 86 86   MCH 28.0 28.5 28.5   MCHC 32.6 33.3 33.2   RDW 12.7 12.8 12.9    182 192     INRNo lab results found in last 7 days.  Arterial Blood GasNo lab results found in last 7 days.    No results  found for this or any previous visit (from the past 48 hour(s)).

## 2018-11-28 NOTE — CONSULTS
"Nutrition Services Brief Note    Received RN consult for \"patient not eating well, does like boost, how many boost can he have per day?\" RD already following pt; last seen on 11/25.  Per chart review pt scheduled to have a hospice meeting today at 10am. Called RN to discuss how meeting went and she stated pt will be here until Thursday 11/29. Discussed possibility of sending 6 Boost/day to pt and RN agreeable. To address consult, writer will place order for Boost TID with meals and between meals (variable flavors).    RD will continue to monitor pt.    Nasrin Thurman RDN, UTE  [facility]: [phone]  [facility]: [phone]    "

## 2018-11-29 VITALS
WEIGHT: 211.64 LBS | SYSTOLIC BLOOD PRESSURE: 121 MMHG | OXYGEN SATURATION: 93 % | BODY MASS INDEX: 31.35 KG/M2 | HEART RATE: 112 BPM | HEIGHT: 69 IN | RESPIRATION RATE: 24 BRPM | DIASTOLIC BLOOD PRESSURE: 69 MMHG | TEMPERATURE: 98.5 F

## 2018-11-29 LAB — GLUCOSE BLDC GLUCOMTR-MCNC: 316 MG/DL (ref 70–99)

## 2018-11-29 PROCEDURE — 25000131 ZZH RX MED GY IP 250 OP 636 PS 637: Performed by: PHYSICIAN ASSISTANT

## 2018-11-29 PROCEDURE — 25000128 H RX IP 250 OP 636: Performed by: FAMILY MEDICINE

## 2018-11-29 PROCEDURE — 99239 HOSP IP/OBS DSCHRG MGMT >30: CPT

## 2018-11-29 PROCEDURE — 25000132 ZZH RX MED GY IP 250 OP 250 PS 637: Performed by: PHYSICIAN ASSISTANT

## 2018-11-29 PROCEDURE — 99233 SBSQ HOSP IP/OBS HIGH 50: CPT | Performed by: NURSE PRACTITIONER

## 2018-11-29 PROCEDURE — 25000132 ZZH RX MED GY IP 250 OP 250 PS 637: Performed by: NURSE PRACTITIONER

## 2018-11-29 PROCEDURE — 25000132 ZZH RX MED GY IP 250 OP 250 PS 637

## 2018-11-29 PROCEDURE — 40000275 ZZH STATISTIC RCP TIME EA 10 MIN

## 2018-11-29 PROCEDURE — 00000146 ZZHCL STATISTIC GLUCOSE BY METER IP

## 2018-11-29 RX ADMIN — CALCIUM CARBONATE (ANTACID) CHEW TAB 500 MG 500 MG: 500 CHEW TAB at 04:01

## 2018-11-29 RX ADMIN — HYDROCODONE BITARTRATE AND ACETAMINOPHEN 2 TABLET: 5; 325 TABLET ORAL at 04:01

## 2018-11-29 RX ADMIN — ENOXAPARIN SODIUM 40 MG: 40 INJECTION SUBCUTANEOUS at 04:01

## 2018-11-29 RX ADMIN — HYDROCODONE BITARTRATE AND ACETAMINOPHEN 2 TABLET: 5; 325 TABLET ORAL at 10:18

## 2018-11-29 RX ADMIN — SENNOSIDES AND DOCUSATE SODIUM 2 TABLET: 8.6; 5 TABLET ORAL at 07:59

## 2018-11-29 RX ADMIN — LEVETIRACETAM 500 MG: 500 TABLET, FILM COATED ORAL at 08:19

## 2018-11-29 RX ADMIN — PANTOPRAZOLE SODIUM 40 MG: 40 TABLET, DELAYED RELEASE ORAL at 06:55

## 2018-11-29 RX ADMIN — METHYLPHENIDATE HYDROCHLORIDE 5 MG: 5 TABLET ORAL at 08:19

## 2018-11-29 RX ADMIN — METFORMIN HYDROCHLORIDE 500 MG: 500 TABLET ORAL at 08:19

## 2018-11-29 RX ADMIN — DRONABINOL 5 MG: 2.5 CAPSULE ORAL at 09:38

## 2018-11-29 RX ADMIN — SODIUM CHLORIDE, PRESERVATIVE FREE 5 ML: 5 INJECTION INTRAVENOUS at 09:39

## 2018-11-29 RX ADMIN — BUSPIRONE HYDROCHLORIDE 10 MG: 10 TABLET ORAL at 08:22

## 2018-11-29 RX ADMIN — DEXAMETHASONE 8 MG: 4 TABLET ORAL at 08:23

## 2018-11-29 RX ADMIN — INSULIN ASPART 8 UNITS: 100 INJECTION, SOLUTION INTRAVENOUS; SUBCUTANEOUS at 08:13

## 2018-11-29 ASSESSMENT — ACTIVITIES OF DAILY LIVING (ADL)
ADLS_ACUITY_SCORE: 12

## 2018-11-29 NOTE — DISCHARGE INSTRUCTIONS
"From Shane Guerrero, Palliative Care NP, Cell [phone]     I sent prescriptions to [facility] and [facility] on Sunday, expecting him to return home then.  So please plan to visit both pharmacies to  meds before Raphael goes home.  Thanks.     Medical Cannabis    Here is the main web site for information on medical cannabis:  [email or web address]     The web site notes that it may take UP TO 30 DAYS for Raphael to hear from the [place] Department of Health notifying him that his registration has been accepted, and to advise of next steps.  Please spend a LOT of time reviewing information for patients on this web site.  It will explain the precautions, locations of dispensaries, etc.  If you investigate the links to particular dispensaries, you will find general information on costs.    Only time and experimentation will tell if and how much this helps Raphael's appetite; the pharmacists who work at the dispensary can also give you information on what they've learned from other patients treated for the same symptom.     11/28: Don't feel like you have to get this.  It's expensive.  I believe one of my patients told me they were told it can take 6 weeks or so before it will start working.  Use the Marinol/dronabinol instead.  Furthermore, it looks like Mirtazapine was also sent to [facility], and it might help Raphael's appetite.      Marinol    This is prescription marijuana-like medicine for appetite that appears to be covered by your insurance.  See \"Appetite\" below.     Ritalin = Methylphenidate    This med helps keep you awake.  Take it first thing in the morning and again at 2pm or before.  Don't take it after 2 pm as it can keep you awake at night.    Constipation    To prevent constipation, take 1 cap of Miralax in fluid daily PLUS 2 Senna tabs at bedtime.      Stop the docusate; there is no proof that it works.    TUMS can also be constipating and may not work as " "well as other meds for heartburn. Your hospice RN can guide you if you want to try something instead of TUMS.    If the above doses of Miralax and Senna don't result in a BM at least every other day, you may increase Miralax to twice daily and Senna up to 4 tabs twice daily.  You can adjust these on your own.  Aim for a standard dose that you take every single day.    If you need an extra boost, Raphael may take 2-3 Bisacodyl 5 mg tabs daily; they usually work within about 4 hours.  Take the Bisacodyl ONLY as NEEDED, not daily.    Nausea    I sent in two different prescriptions for anti-nausea meds:  Ondansetron and Prochlorperazine.  They work in different ways and sometimes people need to take both.  While Raphael was here, he was given Ondansetron.  If that worked here, it should work at home, so start with that drug.  If it doesn't relieve nausea within 1 hour, take the other drug.      Keep track (record the date, time, drug and dose) of each anti-nausea med that Raphael takes on a day by day basis.      Appetite    Dronabinol = Marinol, two different names for the same medicine.  Raphael will be discharged on the 5 mg dose.  Take it twice daily before meals.  It can cause euphoria.  If insurance paid for it, you may try increasing the dose to 3x/day before meals.  Cut back to twice a day if (1) Raphael has mental status changes or (2) if it doesn't work.  This is a controlled drug and requires a \"hard copy\" prescription with a provider's original signature.  This is synthetic marijuana/THC.      Pain    Poorly controlled pain can deplete one's energy.  I hope Raphael's constipation is sufficiently controlled that he'll be willing to take Norco for pain when he needs it. Also keep track of each dose of Norco taken--date, time, dose.  Hospice can use this information to determine if or when he might benefit from a long-acting opioid. If he gets to taking 6 tabs over a 24-hour period for a couple of consecutive days, it " means he would probably benefit from a long-acting opioid.     MISC:    Dexamethasone dose is now 8mg (two 4mg tabs) twice daily.  Take it with breakfast and supper.    The Pantoprazole/Protonix should be taken daily on an empty stomach first thing in the morning.  It turns off the stomach's acid pump, which is put into hyperdrive by the Dexamethasone.  Some people still get stomach aches though--from the Dexamethasone.  So just keep your hospice RN informed.    Metformin dose, here, was reduced to just one 500 mg tab twice a day.  I believe your baseline dose was 1000 mg twice daily.  If you don't mind checking your blood sugar a few times, you and your hospice RN can probably figure out which dose you're going to need.  If you don't want to check your blood sugars, stick with the 500 mg dose twice daily as it's safer to run a HIGH blood sugar than a LOW blood sugar in a circumstance like this.     I stopped the Lipitor.  Raphael's not eating enough to have ANY problems with cholesterol right now.  I'm sure you all wish he were!    There's one prescription I did not write for which Raphael was given once or twice the whole time he was here--Lorazepam.  It's a tranquilizer like Buspar.  Until hospice delivers that medicine, if he feels anxious, just give him an extra dose of Buspar.  I'm pretty sure hospice will be delivering some Lorazepam to the home.     Please call me if you have other questions.      Best wishes to you all!

## 2018-11-29 NOTE — PROGRESS NOTES
Reason for Follow up: Discharge Planning and support.    Anticipated discharge needs: Met with pt and family. Discussed discharge plan to home tomorrow at 1100. Pt plans to have [facility] services tele:[phone]/fax [phone]. In addition, talked about the benefit of having an Advance Directive and POA in place for pt. Confirmed arrangements and faxed H&P to [facility] as requested. Pt will have hospital bed, over the bed table, commode, and lift delivered to home in the morning. Everything should be in place for the pt prior to returning home.    Next steps: [facility] to be called in the morning at [phone] - transport to be arranged via stretcher with O2.    Discharge Planner   Discharge Plans in progress: Pt to discharge 11/29/18 at 1100 via [facility] with [facility] to follow.    Barriers to discharge plan: NONE    Follow up plan:  [facility]Emergency Ambulance to be called in the morning at [phone] - transport to be arranged via stretcher with O2. Provide support to pt/family as needed.       Entered by: Ivette Tinajero 11/28/2018 8:13 PM           GINETTE Lyle [phone]

## 2018-11-29 NOTE — PROGRESS NOTES
[facility] DISCHARGE NOTE    Patient discharged to home at 11:01 AM via stretcher. Accompanied by other: EMS and staff. Discharge instructions reviewed with patient and Mother. opportunity offered to ask questions. Prescriptions sent to patients preferred pharmacy. All belongings sent with patient.    No Payan

## 2018-11-29 NOTE — PROGRESS NOTES
Patient up to chair for breakfast, able to pivot from bed to chair with assist. Port de-accesed and heparin locked. Discharged home with hospice care at 1100. [place] 2 tabs given before discharge to alleviate pain during transport home. Patient's mother at bedside and present for discharge.

## 2018-11-29 NOTE — PLAN OF CARE
Problem: Patient Care Overview  Goal: Plan of Care/Patient Progress Review  Outcome: Improving  Pt had a restful night. Lung sounds are diminished t/o, he is on 5 L per NC maintaining his sat's >90%.  Pt was pretty sore and stiff when this writer went in to check on him and get his VS at 0400, pt had then asked for 2 Norco's and the need to bed repositioned.  He also was offered a orange Breeze in which he agreed to.  Will continue to monitor close for changes.

## 2018-11-29 NOTE — DISCHARGE SUMMARY
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    Discharge Diagnoses       Declining functional status    Type 2 diabetes mellitus treated without insulin (H)    Primary clear cell carcinoma of kidney, left (H)    Essential hypertension with goal blood pressure less than 130/80    Anxiety disorder due to general medical condition    Abdominal pain, generalized    Medical cannabis--Certified eligible on 11/25/18    Hypercalcemia      Discharge Disposition   Discharged to home to the care of [facility].    Discharge Orders     Home Care Referral     Reason for your hospital stay   You came in with generalized weakness and having a difficult time accomplishing activities of daily living on your own due to your underlying cancer, as well as with low oxygen levels and respiratory problems.  You have been able to regain some strength, your breathing and oxygen are better, and you have no established care with the [facility] team, who will be providing care for you in your mom's home starting today.  You're now ready for discharge.     Follow-up and recommended labs and tests    Follow-up with [facility] in your home is already arranged for later today.     Activity   Your activity upon discharge:  activity as tolerated.     DNR/DNI     Oxygen Adult   [facility] Order 5 liter(s) by nasal cannula continuously with use of portable tank. Expected treatment length is indefinite (99 months).. Test on conserving device as applicable.    Patients who qualify for home O2 coverage under the CMS guidelines require ABG tests or O2 sat readings obtained closest to, but no earlier than 2 days prior to the discharge, as evidence of the need for home oxygen therapy. Testing must be performed while patient is in the chronic stable state. See notes for O2 sats.    I certify that this patient, Raphael Marin has been under my care and that I, or a nurse practitioner or physician's assistant working with me, had a face-to-face encounter that meets the face-to-face encounter requirements with this patient on 11/29/2018. The patient, Raphael Marin was evaluated or treated in whole, or in part, for the following medical condition, which necessitates the use of the ordered oxygen. Treatment Diagnosis: Metastatic lung disease.    Attending Provider: Surinder Rivera  Physician signature: See electronic signature associated with these discharge orders  Date of Order: November 29, 2018     Diet   Follow this diet upon discharge: Orders Placed This Encounter     Snacks/Supplements Adult: Boost Plus; Between Meals     Regular Diet Adult           Further instructions from your care team       From Shane Guerrero, Palliative Care NP, Cell [phone]     I sent prescriptions to [facility] on Sunday, expecting him to return home then.  So please plan to visit both pharmacies to  meds before Raphael goes home.  Thanks.     Medical Cannabis    Here is the main web site for information on medical cannabis:  Http://[email or web address].state.[place]/topics/cannabis/patients/index.html     The web site notes that it may take UP TO 30 DAYS for Raphael to hear from the [place] Department of Health notifying him that his registration has been  "accepted, and to advise of next steps.  Please spend a LOT of time reviewing information for patients on this web site.  It will explain the precautions, locations of dispensaries, etc.  If you investigate the links to particular dispensaries, you will find general information on costs.    Only time and experimentation will tell if and how much this helps Raphael's appetite; the pharmacists who work at the dispensary can also give you information on what they've learned from other patients treated for the same symptom.     11/28: Don't feel like you have to get this.  It's expensive.  I believe one of my patients told me they were told it can take 6 weeks or so before it will start working.  Use the Marinol/dronabinol instead.  Furthermore, it looks like Mirtazapine was also sent to [facility], and it might help Raphael's appetite.      Marinol    This is prescription marijuana-like medicine for appetite that appears to be covered by your insurance.  See \"Appetite\" below.     Ritalin = Methylphenidate    This med helps keep you awake.  Take it first thing in the morning and again at 2pm or before.  Don't take it after 2 pm as it can keep you awake at night.    Constipation    To prevent constipation, take 1 cap of Miralax in fluid daily PLUS 2 Senna tabs at bedtime.      Stop the docusate; there is no proof that it works.    TUMS can also be constipating and may not work as well as other meds for heartburn. Your hospice RN can guide you if you want to try something instead of TUMS.    If the above doses of Miralax and Senna don't result in a BM at least every other day, you may increase Miralax to twice daily and Senna up to 4 tabs twice daily.  You can adjust these on your own.  Aim for a standard dose that you take every single day.    If you need an extra boost, Raphael may take 2-3 Bisacodyl 5 mg tabs daily; they usually work within about 4 hours.  Take the Bisacodyl ONLY as NEEDED, not daily.    Nausea    I sent in two " "different prescriptions for anti-nausea meds:  Ondansetron and Prochlorperazine.  They work in different ways and sometimes people need to take both.  While Raphael was here, he was given Ondansetron.  If that worked here, it should work at home, so start with that drug.  If it doesn't relieve nausea within 1 hour, take the other drug.      Keep track (record the date, time, drug and dose) of each anti-nausea med that Raphael takes on a day by day basis.      Appetite    Dronabinol = Marinol, two different names for the same medicine.  Raphael will be discharged on the 5 mg dose.  Take it twice daily before meals.  It can cause euphoria.  If insurance paid for it, you may try increasing the dose to 3x/day before meals.  Cut back to twice a day if (1) Raphael has mental status changes or (2) if it doesn't work.  This is a controlled drug and requires a \"hard copy\" prescription with a provider's original signature.  This is synthetic marijuana/THC.      Pain    Poorly controlled pain can deplete one's energy.  I hope Raphael's constipation is sufficiently controlled that he'll be willing to take Norco for pain when he needs it. Also keep track of each dose of Norco taken--date, time, dose.  Hospice can use this information to determine if or when he might benefit from a long-acting opioid. If he gets to taking 6 tabs over a 24-hour period for a couple of consecutive days, it means he would probably benefit from a long-acting opioid.     MISC:    Dexamethasone dose is now 8mg (two 4mg tabs) twice daily.  Take it with breakfast and supper.    The Pantoprazole/Protonix should be taken daily on an empty stomach first thing in the morning.  It turns off the stomach's acid pump, which is put into hyperdrive by the Dexamethasone.  Some people still get stomach aches though--from the Dexamethasone.  So just keep your hospice RN informed.    Metformin dose, here, was reduced to just one 500 mg tab twice a day.  I believe your baseline " dose was 1000 mg twice daily.  If you don't mind checking your blood sugar a few times, you and your hospice RN can probably figure out which dose you're going to need.  If you don't want to check your blood sugars, stick with the 500 mg dose twice daily as it's safer to run a HIGH blood sugar than a LOW blood sugar in a circumstance like this.     I stopped the Lipitor.  Raphael's not eating enough to have ANY problems with cholesterol right now.  I'm sure you all wish he were!    There's one prescription I did not write for which Raphael was given once or twice the whole time he was here--Lorazepam.  It's a tranquilizer like Buspar.  Until hospice delivers that medicine, if he feels anxious, just give him an extra dose of Buspar.  I'm pretty sure hospice will be delivering some Lorazepam to the home.     Please call me if you have other questions.      Best wishes to you all!       Discharge Medications   Current Discharge Medication List      START taking these medications    Details   bisacodyl (DULCOLAX) 5 MG EC tablet Take 2 tablets (10 mg) by mouth daily as needed (constipation not controlled by Senna and Miralax)  Qty: 60 tablet, Refills: 3    Associated Diagnoses: Slow transit constipation      dronabinol (MARINOL) 5 MG capsule Take 1 capsule (5 mg) by mouth 2 times daily (before meals) May increase to 3x/day before meals if tolerated  Qty: 90 capsule, Refills: 1    Associated Diagnoses: Nausea and vomiting, intractability of vomiting not specified, unspecified vomiting type; Anorexia      melatonin 1 MG TABS tablet Take 1 tablet (1 mg) by mouth nightly as needed for sleep  Qty: 60 tablet, Refills: 1    Associated Diagnoses: Primary clear cell carcinoma of kidney, left (H)      methylphenidate (RITALIN) 5 MG tablet Take 1 tablet (5 mg) by mouth 2 times daily  Qty: 10 tablet, Refills: 0    Associated Diagnoses: Primary clear cell carcinoma of kidney, left (H); Neoplastic (malignant) related fatigue       mirtazapine (REMERON) 15 MG tablet Take 1 tablet (15 mg) by mouth At Bedtime  Qty: 30 tablet, Refills: 3    Associated Diagnoses: Anorexia; Primary clear cell carcinoma of kidney, left (H)      ondansetron (ZOFRAN-ODT) 4 MG ODT tab Take 1 tablet (4 mg) by mouth every 6 hours as needed for nausea or vomiting  Qty: 120 tablet, Refills: 3    Associated Diagnoses: Primary clear cell carcinoma of kidney, left (H); Nausea and vomiting, intractability of vomiting not specified, unspecified vomiting type      prochlorperazine (COMPAZINE) 10 MG tablet Take 1 tablet (10 mg) by mouth every 6 hours as needed (nausea/vomiting not controlled by Ondansetron (OK to take both))  Qty: 90 tablet, Refills: 3    Associated Diagnoses: Primary clear cell carcinoma of kidney, left (H); Nausea and vomiting, intractability of vomiting not specified, unspecified vomiting type      senna-docusate (SENOKOT-S;PERICOLACE) 8.6-50 MG per tablet Take 2 tablets by mouth At Bedtime May increase to 2 tabs twice daily if necessary.  Qty: 100 tablet, Refills: 3    Associated Diagnoses: Slow transit constipation         CONTINUE these medications which have CHANGED    Details   dexamethasone (DECADRON) 4 MG tablet Take 2 tablets (8 mg) by mouth 2 times daily (with meals) Do not stop suddenly; drug must be tapered down.  Qty: 120 tablet, Refills: 1    Associated Diagnoses: Brain metastasis (H); Primary clear cell carcinoma of kidney, left (H)      polyethylene glycol (MIRALAX/GLYCOLAX) powder Take 17 g by mouth daily May increase dose to twice daily if necessary.  Qty: 500 g, Refills: 3    Associated Diagnoses: Slow transit constipation         CONTINUE these medications which have NOT CHANGED    Details   albuterol (PROAIR HFA/PROVENTIL HFA/VENTOLIN HFA) 108 (90 Base) MCG/ACT inhaler Inhale 2 puffs into the lungs every 4 hours as needed for shortness of breath / dyspnea or wheezing      benzonatate (TESSALON) 200 MG capsule Take 200 mg by mouth 3 times  daily as needed for cough       busPIRone (BUSPAR) 10 MG tablet Take 10 mg by mouth 2 times daily      HYDROcodone-acetaminophen (NORCO) 5-325 MG per tablet Take 1-2 tablets by mouth every 4 hours as needed for moderate to severe pain       levETIRAcetam (KEPPRA) 500 MG tablet Take 500 mg by mouth 2 times daily      lidocaine-prilocaine (EMLA) cream Apply topically as needed for moderate pain Apply quarter size amount to port site 30 to 60 minutes prior to access.      metFORMIN (GLUCOPHAGE-XR) 500 MG 24 hr tablet Take 1,000 mg by mouth 2 times daily (before meals)      pantoprazole (PROTONIX) 40 MG EC tablet Take 40 mg by mouth daily      order for DME Equipment being ordered: Hospital Bed  Requesting hospital bed for living room on main floor. 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 Dizziness, fall risk, fatigue/malaise, difficulty navigating stairs due to brain mets. Also CERON/SOB affects mobility around home especially up and down stairs.  Qty: 1 each, Refills: 0    Associated Diagnoses: Brain metastasis (H); Metastasis to lung (H)         STOP taking these medications       atorvastatin (LIPITOR) 10 MG tablet Comments:   Reason for Stopping:         Cabozantinib S-Malate (CABOMETYX) 60 MG Comments:   Reason for Stopping:         docusate sodium (COLACE) 100 MG capsule Comments:   Reason for Stopping:             Allergies   No Known Allergies    Consultations This Hospital Stay   Consultation during this admission received from:    SOCIAL WORK IP CONSULT  OCCUPATIONAL THERAPY ADULT IP CONSULT  PHYSICAL THERAPY ADULT IP CONSULT  PALLIATIVE CARE ADULT IP CONSULT  NUTRITION SERVICES ADULT IP CONSULT    Significant Results and Procedures   Procedures    None.    Data   Results for orders placed or performed during the hospital encounter of 11/23/18   CT Chest/Abdomen/Pelvis w Contrast    Narrative    CT CHEST, ABDOMEN, AND PELVIS WITH CONTRAST 11/23/2018 10:24 PM      HISTORY: Abdominal pain, chronic cough, known renal cell carcinoma  with mets to lung and brain.    COMPARISON: CT chest from August 27, 2018, CT chest, abdomen, pelvis  from August 8, 2018    TECHNIQUE: Volumetric helical acquisition of CT images from the lung  apices through the symphysis pubis after the administration of 100 mL  Isovue -370 intravenous contrast. Radiation dose for this scan was  reduced using automated exposure control, adjustment of the mA and/or  kV according to patient size, or iterative reconstruction technique.    FINDINGS:     Chest: Multiple bilateral pulmonary masses noted. No pleural or  pericardial effusion. Extensive mediastinal and bilateral hilar  adenopathy. All of these findings are significantly progressed since  the comparison study. Normal caliber aorta. Normal heart size.    Abdomen and pelvis: Mass in the left renal fossa measures 9.1 x 8.3 cm  today. Previously this measured 5.4 x 5.0 cm. Decreased retrograde  peritoneal adenopathy since the comparison study. Small amount of  pelvic free fluid. No bowel obstruction. No hydronephrosis. Liver,  gallbladder, spleen, and pancreas demonstrate no worrisome focal  lesion. Multiple nodules in the right adrenal gland are increased in  size compatible with worsening metastatic disease.     Survey of the visualized bony structures demonstrates no destructive  bony lesions.      Impression    IMPRESSION:  1. Increase in size and number of pulmonary masses and adenopathy  compatible with worsening metastatic disease in the chest.  2. Enlarging left renal fossa mass and retroperitoneal adenopathy  compatible with worsening disease.  3. Otherwise no acute process demonstrated in the chest, abdomen, and  pelvis.    ROMA SOLIMAN MD   CT Head w/o Contrast    Narrative    CT SCAN OF THE HEAD WITHOUT CONTRAST   11/25/2018 5:20 PM     HISTORY: Rule out ICH.    TECHNIQUE:  Axial images of the head and coronal reformations without  IV contrast  material.  Radiation dose for this scan was reduced using  automated exposure control, adjustment of the mA and/or kV according  to patient size, or iterative reconstruction technique.    COMPARISON: 10/17/2018    FINDINGS: During the interval, there has been a right frontal  craniotomy for tumor resection. There is some volume loss at the site  of tumor resection. There are also bilateral areas of low density  consistent with vasogenic edema in both hemispheres with other nodular  lesion seen in the left hemisphere measuring approximately 1.2 and 0.9  cm in size. The amounts of vasogenic edema have significantly  progressed since the prior study. Ventricles are not enlarged. There  is no evidence for intracranial hemorrhage or acute infarct.      Impression    IMPRESSION:  1. Interval right frontal craniotomy for tumor resection.  2. Increasing areas of vasogenic edema in both hemispheres with  associated underlying lesions. The pattern suggests progressive  metastatic disease. Clinical correlation suggested.  3. No evidence for intracranial hemorrhage or midline shift.     JOSIE SKELTON MD   Chest 2 Views*    Narrative    CHEST 2 VIEWS   11/26/2018 1:13 AM     HISTORY: New hypoxia.    COMPARISON: 11/23/2018 - CT chest, abdomen and pelvis.    FINDINGS: Moderate-sized mass-like opacities in bilateral lung bases  in addition to a few smaller patchy nodular opacities scattered within  the upper and mid lungs bilaterally, not convincingly changed since  the comparison CT scan dated 11/23/2018, allowing for differences in  technique. Normal-sized cardiac silhouette. Right anterior chest wall  port with catheter entering the right internal jugular vein and distal  tip in the right atrium.      Impression    IMPRESSION:   1. No convincing interval change since 10/23/2018.  2. A few opacities scattered within both lungs, including dominant  moderate-sized mass-like opacities in bilateral lung bases.    JARAD NICHOLE MD    CT Chest pulmonary embolism w contrast    Narrative    CT CHEST WITH CONTRAST   11/26/2018 2:41 AM     HISTORY: New hypoxia.    COMPARISON: 11/23/2018 - CT chest, abdomen and pelvis.    TECHNIQUE: Following the uneventful administration of 80 mL Isovue-370  intravenous contrast, helical sections were acquired through the lungs  according to the pulmonary embolism protocol. Coronal reconstructions  were generated. Radiation dose for this scan was reduced using  automated exposure control, adjustment of the mA and/or kV according  to the patient's size, or iterative reconstruction technique.    FINDINGS: No visualized pulmonary embolus. The thoracic aorta is  normal in caliber without dissection. Right anterior chest wall port  with catheter entering the right internal jugular vein and distal tip  in the right atrium.    Several mass-like opacities scattered within both lungs, including a  dominant 11 cm opacity in the right lung base, not convincingly  changed since 11/23/2018. Mild groundglass opacities scattered with in  the central aspects of both lungs, new. Tiny left pleural effusion  again noted. No right pleural or pericardial effusion. Several mildly  and moderately enlarged mediastinal and right hilar lymph nodes,  unchanged. For example, there is a 2.9 x 2.8 cm right hilar lymph node  (series 4,  image 69). Patchy lucencies within bilateral humeral heads  again noted.    Scan through the upper abdomen is significant for a complex 11 x 8 cm  fluid collection abutting the posterior wall of the stomach (series 4,  image 134) that has moderately increased in size. Again noted are  moderate diffuse fatty infiltration of the liver, moderate nodularity  of the right adrenal gland, prior left nephrectomy, a 9 cm  heterogeneous mass centered in the left renal fossa and mild  splenomegaly.      Impression    IMPRESSION:   1. Mild groundglass opacities in the central aspects of both lungs  that could represent  "atelectasis or pulmonary edema.  2. No visualized pulmonary embolus.  3. Extensive metastatic neoplasm in the chest including several  bilateral pulmonary masses, several enlarged thoracic lymph nodes, a  probable right adrenal metastasis, probable bilateral humeral  metastasis, and a large metastasis in the left renal fossa, not  convincingly changed since the recent study dated 11/23/2018.  4. Nonspecific moderate-sized irregular-shaped 11 x 8 cm fluid  collection in the upper left hemiabdomen and abutting the posterior  wall of the stomach, moderately increased in size.    JARAD NICHOLE MD       History of Present Illness   (From H&P) Raphael Marin is a 38 year old male with the above past medical history now presents on 11/23/2018 with abdominal pain, poor oral intake, generalized weakness, and safety concerns.      Patient was diagnosed with renal cell carcinoma in May 2018. He follows with Dr. Rodriguez through [facility] system. He has left nephrectomy in June 2018. He developed headaches and was found to have mets to brain and lungs. On 11/1/18 he underwent right craniotomy for tumor resection. He is currently receiving whole brain radiation through Dr. Quijano at [facility] as it is closer to home for him. He has completed 6 of 10 rounds of radiation thus far, last radiation course was today 11/23/2018.     Since starting his radiation he has become progressively weaker, has lost his appetite, and has not been doing well at home. He denies any falls, but feels so weak that he has nearly fallen on a few occasions. He denies dizziness or syncope. Since his tumor resection he has some right arm weakness, but otherwise no focal weakness.     His abdominal problems started about a week ago. He often feels nauseated and \"gaggy,\" and on multiple occasions he has vomited his food, drink, or medications. No hematemesis. He has not been eating or drinking much. He develops post prandial fullness after only a few bites " of food. He has difficulty with bowel movements, although his last bowel movement was this morning. He denies melena or hematochezia. He feels very bloated and at times constipated.      He has a chronic cough for the past few months with shortness of breath. He was told this is possibly related to his lung mets. He has tried some cough suppressants without much relief. No wheezing, fevers, chills.     Patient lives with his parents. Starting in 2 days, they will both be gone during the day at work. They are concerned about patient's safety being home alone all day with his current state of health.      The remainder review of systems is negative.    Hospital Course   Raphael Marin was admitted on 11/23/2018.  The following problems were addressed during his hospitalization:    Declining functional status in patient with metastatic cancer  Overall not doing well since starting whole brain radiation. Poor oral intake, dehydrated, generally weak. Family is concerned about his safety at home while they are gone at work. Patient was a direct admit request from Dr. Quijano (radiation oncologist) for further evaluation.  Remains deconditioned and weak, remains in ICU due to hypoxemia.       LISSETTE De La Paz CNP met with patient and his mom 11/27/18 morning to review palliative care issues and to discuss plans for future care and end-of-life planning.  I met with patient and his mom the same evening to discuss the same.  Patient's primary goal is to get home; he will be going to live with his mother as he cannot live independently as ill as he is.  He is currently Full Code status by his own decision, but is now undecided about that.  There is a plan in place for a team from [facility] to meet with him and his mom here in-house; that meeting occurred at 1000 today, 11/28/18.  Raphael has decided to enroll into Hospice.  Discharge to Hospice is planned for 1100 today.  - Discharge to [facility] at 1100 today.  -  Continue high calorie/high protein supplements recommended in addition to general diet per RD recommendations, which are ordered.          Abdominal pain, generalized  Poor oral intake  Postprandial fullness  On admission reported inconsistent bowel movements, nausea, and vomiting. Develops postprandial fullness after just a few bites of food. Afebrile, minimal leukocytosis (WBC 11.1) upon admission.  CT abdomen 11/23/18 showed significant increased in tumor burden in the lungs, abd lymph nodes, left kidney bed and left adrenal gland, but no evidence of bowel obstruction.  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  Currently reports no abdominal pain, no nausea or recent emesis, abdominal exam unremarkable.  Able to take in some food, and Boost.  - Some improvement in abdominal symptoms with dronabinol; dose increased 11/27 to 5 mg BID, with instructions that dose can be further increased to 5 mg TID if needed for nausea/vomiting or poor appetite.  - Palliative Care has qualified him for medical marijuana upon discharge as well.          Hypercalcemia   Etiology not entirely clear, but has lucencies in the bilateral humeral heads on chest CT suggestive of bony mets.  May also be due to a PTHrH from neoplasm; labs pending.  Ca 12.5 on admission, treated with IVF, improved to 10.9 on 11/25/18.  So IVF stopped.  Ca then 11.1-->11.7 this morning after furosemide 40 mg x3 doses yesterday.  Ca ionized 6.6 this morning.  Given continued rise in Ca despite IVF and loop diuretics, I gave him zoledronic acid 4 mg x1 on 11/26/18.  Ca ionized slightly better at 6.2 as of 11/27/18.  - PTH low, which is a non-specific finding, with PTHrH pending.  - Next zoledronic acid could be given on or after 12/3/18, if needed.  - I did not order repeat labs given transition to Hospice.      Brain mets with generalized edema  -CT head done 11/25/18:  1. Interval right frontal craniotomy for tumor resection.  2.  Increasing areas of vasogenic edema in both hemispheres with  associated underlying lesions. The pattern suggests progressive  metastatic disease. Clinical correlation suggested.  3. No evidence for intracranial hemorrhage or midline shift.   - Suspect edema described above is due to XRT, but can discuss with Dr Quijano, Radiation Oncology, to get his opinion.  - Remains on dexamethasone 8 mg BID, which in my opinion should continue, both to help mitigate mass effect from mets, and also to void possible discomfort from steroid withdrawal.      Generalized weakness, risk for falls  Will be alone all day starting this week while family is at work. Family concerned about patient's safety with his weakness.  Brain mets likely affecting patient's balance and overall function.   - PT/OT evaluations can stop given transition to Hospice.          Cough   Chronic cough. Known lung mets. Chest CT on 11/26/18 confirms the present of extensive metastatic disease to both lungs in the form of large bilateral masses.  Cough reported to be mild and non-productive this morning - low suspicion for pneumonia. Cough managed prior to admission with Tessalon and albuterol without much relief.  Cough has improved over last two days, but persists.  - Continue benzonatate and albuterol PRN.          Tachycardia   HR 93 - 123 last 24 hours.  Tachycardia has been mild and persistent since admission.  Cause unclear.  No evidence of PE on chest CT 11/26/18.   - Observe HR.          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 S/P left nephrectomy in June 2018, S/P brain tumor resection on 11/1/18, all through [facility]. Follows with oncology Dr. Rodriguez through [facility], but is doing radiation at [facility], which is closer to home. On day of admission (11/23/2018) he received round 6 of 10 of whole brain radiation. Has been prescribed carozantinib for RCC but has not yet started taking it.  Receives decadron 4 mg q 6 hours prior to admission for radiation induced inflammation. On Keppra prior to admission.  No seizure activity seen here since admission.  - Hold prior to admission carozantinib  - Continue prior to admission dexamethasone, though dose changed from 4 mg Q6H to 8 mg Q12H.  - Continue prior to admission Keppra.          Type 2 diabetes mellitus treated without insulin  Hgb A1c 6.7 on admission.  Managed prior to admission with metformin 1000 mg BID, though is not receiving metformin here in-house; can't find documentation as to why as GFR has been OK.  Resumed metformin at lower than preadmission dose at 500 mg BID on 11/26/18.  Glucose remains consistently high, 256 to 327 last 5 checks.  - Will continue metformin 500 mg BID without dose increase despite persistent hyperglycemia, as I am concerned that patient's PO intake is likely to decline in near future, which could cause hypoglycemia.  Metformin can be discontinued at any time after discharge should patient's appetite wane.          Essential hypertension with goal blood pressure less than 130/80  Not on antihypertensive medications prior to admission.  BP OK here on no Rx.  - Discharging on no antihypertensive medications.          Anxiety disorder due to general medical condition  Stable mood upon admission. Managed prior to admission with Buspar, continue.     Hypoxemic respiratory failure, acute  O2 needs increased over night 11/25-26, transferred to ICU.  BiPAP used for about an hour, patient had difficulties tolerating it so it has been removed.  Oxygenation has gradually improved; currently on O2 per 5 L cannula, SpO2 91-94%.  Denies dyspnea or respiratory chest pain.  CT chest 11/26/18 showed no PE, extensive bilateral masses unchanged, mild perihilar airspace opacities new from prior study possibly representing pulmonary edema.  I haven't wanted to give diuretics given persistent tachycardia.  - Continue O2 per cannula,  current flow of 5 L/min is adequate.  When patient is discharged to Hospice, though I think we should accept lower SpO2 as long as he is asymptomatic, even in the 80's.  Home O2 order written.    Pending Results   Unresulted Labs Ordered in the Past 30 Days of this Admission     Date and Time Order Name Status Description    11/26/2018 0000 PTH Related Peptide Test In process           Physical Exam   Temp:  [97.8  F (36.6  C)-98.5  F (36.9  C)] 98.5  F (36.9  C)  Heart Rate:  [] 97  Resp:  [18-26] 24  BP: (111-123)/(69-75) 121/69  SpO2:  [92 %-94 %] 93 %  Vitals:    11/25/18 0607 11/26/18 0300 11/28/18 0623   Weight: 75.5 kg (166 lb 7.2 oz) 98 kg (216 lb 0.8 oz) 96 kg (211 lb 10.3 oz)       GENERAL: Pleasant man, seated in recliner, looks fatigued, but in no distress.  EYES: Eyes grossly normal to inspection, extraocular movements intact  HENT: Nares patent bilaterally, no discharge.  NECK: Trachea midline, no stridor.    RESP: No accessory muscle use.  Symmetrical breath sounds.  Scattered areas of mild crackles on inspiration over both sides, loudest left anterior.  Expiration not prolonged, no wheeze.  CV: Regular rate and rhythm, mildly tachycardic.  Normal S1 S2, no murmur or extra sound.  No lower extremity edema.  ABDOMEN: Soft, non-tender, no guarding.  Liver and spleen not palpable, no masses palpable.  Bowel sounds positive.  MS: No bony deformities noted.  No red or inflamed joints.  SKIN: Warm and dry, no rashes.  NEURO: Appears mildly somnolent, but is oriented, conversant.  Cranial nerves II - XII grossly intact.  No gross motor or sensory deficits.    PSYCH: Calm, mildly somnolent, conversant.  Able to articulate logical thoughts, no tangential thoughts, no hallucinations or delusions.      The discharge plan was discussed with the patient and his mom, both of whom express agreement.    Total time on this discharge was 35 minutes.    Surinder Rivera MD

## 2018-11-29 NOTE — PROGRESS NOTES
Palliative Care Follow-up Clinic Note  Date of Admission:  11/23/2018   Visit Date:  November 29, 2018        HISTORY of PRESENT ILLNESS:  Raphael Marin is a 38 year old year old male admitted with renal cell carcinoma discovered in May of this year; it was metastatic to lymph and lung at the time of diagnosis, and later metastasized to the brain.  He presented here on 11/23 with weakness, falls, anorexia, failure to thrive.  He is currently undergoing brain radiation with 4 additional treatments pending, and with plans to initiate Cabozantinib, a TKI, upon completion of radiation.  He was referred to Palliative Care for certification for medical cannabis .          ASSESSMENT & PLAN:      # Metastatic renal cell carcinoma (lung, brain, lymph)  # Anorexia, weakness, weight loss; mother reports appetite yesterday improved after start of Marinol 2.5 mg and Remeron at HS  > certified Raphael as eligible for medical cannabis  > 11/27: appetite poor, possibly r/t somnolence.  Increased Dronabinol from 2.5 to 5 mg  > 11/28: nursing indicates he is eating only ~ 25% of meals; still better than before.  Mother has protein powder at home. Patient aware that he can disregard checking blood sugars etc as he'll be on hospice.  > 11/29: still w/fatique and anorexia, though certainly improved since time of admission.  I don't plan to increase Ritalin in light of pt's tachycardia.      # Constipation  > upon discharge, replace colace with Senna, and continue daily Miralax.  PRN Bisacodyl.   > 11/27: feels constipated.  Bisacodyl r/s today  > 11/28: bowels moved yesterday; advised Raphael that his intermittent abdominal pain might be related to constipation.      # Recurrent Nausea; has no antiemetic Rx's at home.  Was given 2 doses Zofran yesterday, and one today thus far.   > see Discharge instructions      # Somnolence possibly 2o hypercalcemia of malignancy.  Zometa given yesterday.  Doesn't meet criteria for subcutaneous miacalcin.   No IV fluids running.  > Ritalin 5 mg bid with 2nd dose of the day no later than 2pm  > 11/28: actually awake today!  Able to participate in meeting with [facility], family.  Will discharge on Ritalin 5 bid      # Advance Care Planning:  > Code status:  Full Code     11/27: discussed with patient and mother again.  He is undecided.  I told him he could think about it, but I also advised his mother to ask him if he would like her to make the decision for him, and she supports DNR/DNI.  I'll revisit this again tomorrow.  > Health Care Directive: NO  > POLST:  No; mother is the default decision make should he lose capacity as his father is dead.      # Goals/Dispo:  Wants to go home so he can resume radiation and start oral chemo upon completion of radiation, as previously planned  > 11/27: mother tells me she wants to take him home with hospice care; we can't act on this until he expresses his agreement so will review this with him tomorrow.  > 11/28: stepfather needs one more day to prepare their home for the equipment that hospice plans to deliver tomorrow morning.  I sense that he is anxious about this as well.   > 11/29: mother asking how and when to transfer records from [facility] to here; hospice did explain that he could always sign out of hospice and resume treatment if he wished. However I don't expect that.  I simply reassured mother that if he strengthens, [facility] could transfer records w/i 24 hours. Anticipate discharge home today followed by enrollment with [facility] today.  Patient plans to stop radiation (to brain) as of now; knows he can resume it if he feels better.      Thank you for the opportunity to be of service to this patient and family.      Shane Guerrero CNP ([facility])  Palliative Care  Private cell:  [phone]       Face to face:   0830 - 0905, 35 min, > 50% spent coordinating care with  attending, nursing and Care Transitions and doing final review of symptoms and  questions prior to imminent discharge.     ========================    SUBJECTIVE:  Sitting up in chair! Smiling! Understands he is going home today.  Expresses the wish that he HOPES for more energy and better appetite as time passes.  I communicated yesterday via [employer] mail to Dr Quijano/Rad Onc that patient had decided to stop radiation in light of his fatigue; knows he can resume it later if he improves.  Given a total of 5 Norco 5mg tabs yesterday for pain.      ROS:  As described in HPI and Subjective.  Otherwise, ALL are negative.    MEDICATIONS:  No Known Allergies  Scheduled meds:    atorvastatin  10 mg Oral At Bedtime     busPIRone  10 mg Oral BID     dexamethasone  8 mg Oral Q12H MONICA     dronabinol  5 mg Oral BID     enoxaparin  40 mg Subcutaneous Q24H     heparin  5 mL Intracatheter Q28 Days     heparin lock flush  5-10 mL Intracatheter Q24H     insulin aspart  1-10 Units Subcutaneous TID AC     insulin aspart  1-7 Units Subcutaneous At Bedtime     levETIRAcetam  500 mg Oral BID     metFORMIN  500 mg Oral BID w/meals     methylphenidate  5 mg Oral BID     pantoprazole  40 mg Oral QAM AC     senna-docusate  1 tablet Oral BID    Or     senna-docusate  2 tablet Oral BID     PRN meds:  acetaminophen, acetaminophen, albuterol, benzonatate, bisacodyl **OR** bisacodyl **OR** bisacodyl, calcium carbonate, glucose **OR** dextrose **OR** glucagon, heparin lock flush, HYDROcodone-acetaminophen, HYDROmorphone, lidocaine 4%, lidocaine 4%, lidocaine (buffered or not buffered), LORazepam, magnesium citrate, melatonin, naloxone, ondansetron **OR** ondansetron, polyethylene glycol, prochlorperazine **OR** prochlorperazine **OR** prochlorperazine, sodium chloride (PF), sodium chloride (PF)      OBJECTIVE:  Patient Vitals for the past 24 hrs:   BP Temp Temp src Heart Rate Resp SpO2   11/29/18 0809 121/69 98.5  F (36.9  C) Oral 97 24 93 %   11/29/18 0349 122/75 98.2  F (36.8  C) Oral 103 20 93 %   11/28/18 1929 111/71 97.8  F  (36.6  C) Oral 105 26 92 %   11/28/18 1500 118/70 - - 103 25 94 %   11/28/18 1400 118/70 - - 103 25 -   11/28/18 1200 116/73 - - 108 18 94 %   11/28/18 1000 123/75 - - 111 22 94 %     Wt Readings from Last 10 Encounters:   11/28/18 211 lb 10.3 oz (96 kg)   11/09/18 233 lb (105.7 kg)   Awake, sitting in chair eating some breakfast  Smiles, expresses hope for more appetite and energy  CV RRR tachy  Lungs clear, markedly diminished effort  Abd obese, normoactive bowel sounds   Ext warm, without edema     Intake/Output Summary (Last 24 hours) at 11/29/18 0853  Last data filed at 11/29/18 0400   Gross per 24 hour   Intake              240 ml   Output             1150 ml   Net             -910 ml         ROUTINE ICU LABS (Last four results)  CMP  Recent Labs  Lab 11/28/18  0610 11/27/18  0620 11/26/18  0455 11/26/18  0045 11/25/18  1659    135 136 137 136   POTASSIUM 3.9 3.6 3.5 4.0 4.0   CHLORIDE 92* 92* 93* 97 98   CO2 35* 36* 34* 33* 30   ANIONGAP 6 7 9 7 8   * 234* 244* 256* 281*   BUN 28 27 25 24 28   CR 1.10 1.32* 1.19 1.15 1.08   GFRESTIMATED 74 60* 68 71 76   GFRESTBLACK >90 73 82 86 >90   MED 9.8 11.6* 11.7* 11.1* 10.9*   PROTTOTAL 5.9*  --  6.6* 6.2* 5.8*   ALBUMIN 2.3*  --  2.6* 2.5* 2.3*   BILITOTAL 0.4  --  0.4 0.4 0.2   ALKPHOS 47  --  49 47 45   AST 14  --  16 17 14   ALT 17  --  18 18 16     CBC  Recent Labs  Lab 11/26/18  0455 11/26/18  0045 11/23/18  1801   WBC 10.1 9.5 11.1*   RBC 5.08 4.87 5.34   HGB 14.2 13.9 15.2   HCT 43.5 41.7 45.8   MCV 86 86 86   MCH 28.0 28.5 28.5   MCHC 32.6 33.3 33.2   RDW 12.7 12.8 12.9    182 192     INRNo lab results found in last 7 days.  Arterial Blood GasNo lab results found in last 7 days.    No results found for this or any previous visit (from the past 48 hour(s)).

## 2018-12-04 ENCOUNTER — DOCUMENTATION ONLY (OUTPATIENT)
Dept: RADIATION THERAPY | Facility: OUTPATIENT CENTER | Age: 38
End: 2018-12-04

## 2018-12-04 ENCOUNTER — TELEPHONE (OUTPATIENT)
Dept: PALLIATIVE MEDICINE | Facility: CLINIC | Age: 38
End: 2018-12-04

## 2018-12-04 DIAGNOSIS — R53.0 NEOPLASTIC (MALIGNANT) RELATED FATIGUE: ICD-10-CM

## 2018-12-04 DIAGNOSIS — C64.2: Primary | ICD-10-CM

## 2018-12-04 DIAGNOSIS — C79.31 BRAIN METASTASIS: ICD-10-CM

## 2018-12-04 DIAGNOSIS — C64.2: ICD-10-CM

## 2018-12-04 RX ORDER — METHYLPHENIDATE HYDROCHLORIDE 5 MG/1
5 TABLET ORAL 2 TIMES DAILY
Qty: 60 TABLET | Refills: 0 | Status: SHIPPED | OUTPATIENT
Start: 2018-12-04

## 2018-12-04 NOTE — PROGRESS NOTES
Radiotherapy Treatment Summary              PATIENT: Raphael Marin  MEDICAL RECORD NO: [id number]   : 1980    DIAGNOSIS: Clear Cell Renal with mets to brain s/p resection of a dominant right frontal lobe lesion on 2018  INTENT OF RADIOTHERAPY: Palliative  PATHOLOGY:  metastatic renal cell carcinoma                                 STAGE:  IV  CONCURRENT SYSTEMIC THERAPY: No        ONCOLOGIC HISTORY:    The patient's history dates to May 2018 when he initially presented with left flank pain and gross hematuria. CT scan of the abdomen and pelvis at the time demonstrated an 11 cm mass arising from the left kidney and numerous lung nodules.  There is also noted retroperitoneal adenopathy.  On 2018 the patient underwent left radical nephrectomy.  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  A single left retroperitoneal lymph node was positive for metastatic carcinoma, pathologic T3a N1.  The patient was subsequently continued on adjuvant systemic therapy with sunitinib.  After progression of disease with increase in size and number of multiple lung nodules, periaortic lymph nodes, and left adrenal nodule systemic therapy was changed to ipilimumab and nivolumab.  Last  cycle of immunotherapy was on 2018.  More recently the patient presented with symptoms of headache and nausea.  He underwent MRI of the brain on 10/17/2018 which demonstrated numerous intracranial metastasis in bilateral cerebral hemispheres consistent with metastatic disease.  The largest is in the right frontal lobe measuring 3.5-3.5 cm.  There was evidence for leftward midline shift and surrounding vasogenic edema.  On 2018 the patient underwent right frontal craniotomy for resection of the dominant frontal mass.  Final pathology confirmed metastatic renal cell carcinoma         SITE OF TREATMENT: whole brain    DATES  OF  TREATMENT: 11/15/18-11/23/18    TOTAL DOSE OF TREATMENT: 1800 cGy/3000 cGy    DOSE PER FRACTION OF TREATMENT: 300 cGy       COMMENT/TOXICITY:   Subjective:   Patient had mild intermittent headache and mild increase in right upper extremity weakness. He  first noticed mild weakness in RUE after initial surgery after resection of dominant brain met. Since starting RT, he noticed slight increase in weakness in RUE compared to left. Given increase in RUE weakness, there was concern for radiation induced inflammation.  Increased decadron dose from 4mg q8hrs to q6hrs.  No falls. Energy low. PO intake limited by mild abdominal bloating. Took magnesium citrate to help with bowel movement and constipation, using miralax daily for bowel regimen. Recommended Gas-X for bloating symptoms. Admitted 11/23/18-11/29/18 for evaluation of functional decline due to cancer.  Dexamethasone 8 mg BID. Discharged to Hospice.      Skin  Skin Reaction: 0 - No changes      Cardiovascular  Respiratory effort: 2 - Mild dyspnea on exertion    Gastrointestinal  Nausea: 1 - One to two episodes of nausea/24  Constipation NCI: 2 - Persistent symptoms                   PAIN MANAGEMENT:   Pain Assessment  Pain Note: pain in abdomen better                            FOLLOW UP PLAN:  1. Transition to [facility] 11/29/18    Radiation Oncologist: Gregg Quijano M.D.  Department of Radiation Oncology  [facility]

## 2018-12-04 NOTE — TELEPHONE ENCOUNTER
Chief Complaint   Patient presents with     Refill Request     Ritalin; not covered by hospice

## 2018-12-11 LAB — PTH RELATED PROT SERPL-SCNC: 45.8 PMOL/L (ref 0–2.3)

## 2023-04-03 PROBLEM — C79.31 MALIGNANT NEOPLASM METASTATIC TO BRAIN (H): Status: ACTIVE | Noted: 2018-12-04
